# Patient Record
Sex: FEMALE | Race: WHITE | NOT HISPANIC OR LATINO | Employment: OTHER | URBAN - METROPOLITAN AREA
[De-identification: names, ages, dates, MRNs, and addresses within clinical notes are randomized per-mention and may not be internally consistent; named-entity substitution may affect disease eponyms.]

---

## 2017-02-07 DIAGNOSIS — I10 HTN (HYPERTENSION): Primary | ICD-10-CM

## 2017-02-07 RX ORDER — LISINOPRIL 5 MG/1
5 TABLET ORAL DAILY
Qty: 90 TABLET | Refills: 2 | Status: SHIPPED | OUTPATIENT
Start: 2017-02-07 | End: 2017-11-01

## 2017-03-21 ENCOUNTER — TELEPHONE (OUTPATIENT)
Dept: CARDIOLOGY | Facility: CLINIC | Age: 77
End: 2017-03-21

## 2017-04-12 ENCOUNTER — TRANSFERRED RECORDS (OUTPATIENT)
Dept: HEALTH INFORMATION MANAGEMENT | Facility: CLINIC | Age: 77
End: 2017-04-12

## 2017-04-12 LAB — PHQ9 SCORE: 1

## 2017-05-15 ENCOUNTER — OFFICE VISIT (OUTPATIENT)
Dept: FAMILY MEDICINE | Facility: CLINIC | Age: 77
End: 2017-05-15
Payer: MEDICARE

## 2017-05-15 VITALS
HEIGHT: 64 IN | WEIGHT: 198.3 LBS | TEMPERATURE: 97.6 F | HEART RATE: 62 BPM | DIASTOLIC BLOOD PRESSURE: 71 MMHG | BODY MASS INDEX: 33.86 KG/M2 | OXYGEN SATURATION: 98 % | SYSTOLIC BLOOD PRESSURE: 120 MMHG

## 2017-05-15 DIAGNOSIS — R68.84 JAW PAIN: ICD-10-CM

## 2017-05-15 DIAGNOSIS — R10.12 ABDOMINAL PAIN, LEFT UPPER QUADRANT: ICD-10-CM

## 2017-05-15 DIAGNOSIS — H93.8X3 PLUGGED FEELING IN EAR, BILATERAL: Primary | ICD-10-CM

## 2017-05-15 PROCEDURE — 99213 OFFICE O/P EST LOW 20 MIN: CPT | Performed by: INTERNAL MEDICINE

## 2017-05-15 NOTE — PATIENT INSTRUCTIONS
If the ears continue to bother you let me know    If the abdominal pain is not gone soon or worsens.    Shawn Méndez M.D.

## 2017-05-15 NOTE — MR AVS SNAPSHOT
"              After Visit Summary   5/15/2017    Tennille Lee    MRN: [id number]           Patient Information     Date Of Birth          1940        Visit Information        Provider Department      5/15/2017 11:00 AM Shawn Méndez MD [facility]        Care Instructions    If the ears continue to bother you let me know    If the abdominal pain is not gone soon or worsens.    Shawn Méndez M.D.          Follow-ups after your visit        Who to contact     If you have questions or need follow up information about today's clinic visit or your schedule please contact [facility] directly at [phone].  Normal or non-critical lab and imaging results will be communicated to you by MyChart, letter or phone within 4 business days after the clinic has received the results. If you do not hear from us within 7 days, please contact the clinic through [employer] or phone. If you have a critical or abnormal lab result, we will notify you by phone as soon as possible.  Submit refill requests through [employer] or call your pharmacy and they will forward the refill request to us. Please allow 3 business days for your refill to be completed.          Additional Information About Your Visit        MyChart Information     [employer] gives you secure access to your electronic health record. If you see a primary care provider, you can also send messages to your care team and make appointments. If you have questions, please call your primary care clinic.  If you do not have a primary care provider, please call [phone] and they will assist you.        Care EveryWhere ID     This is your Care EveryWhere ID. This could be used by other organizations to access your [place] medical records  [id number]        Your Vitals Were     Pulse Temperature Height Pulse Oximetry Breastfeeding? BMI (Body Mass Index)    62 97.6  F (36.4  C) (Oral) 5' 3.5\" (1.613 m) 98% No 34.58 kg/m2       Blood Pressure from Last 3 " Encounters:   05/15/17 120/71   10/06/16 104/62   09/02/16 122/70    Weight from Last 3 Encounters:   05/15/17 198 lb 4.8 oz (89.9 kg)   10/06/16 209 lb (94.8 kg)   09/02/16 209 lb 11.2 oz (95.1 kg)              Today, you had the following     No orders found for display       Primary Care Provider Office Phone # Fax #    Shawn Méndez [phone]       [facility] [address] [facility] [address]  [place] [address]        Thank you!     Thank you for choosing [facility]  for your care. Our goal is always to provide you with excellent care. Hearing back from our patients is one way we can continue to improve our services. Please take a few minutes to complete the written survey that you may receive in the mail after your visit with us. Thank you!             Your Updated Medication List - Protect others around you: Learn how to safely use, store and throw away your medicines at www.disposemymeds.org.          This list is accurate as of: 5/15/17 11:16 AM.  Always use your most recent med list.                   Brand Name Dispense Instructions for use    ALEVE -25 MG Tabs   Generic drug:  Naproxen Sod-Diphenhydramine      Take 2 tablets by mouth At Bedtime       atorvastatin 10 MG tablet    LIPITOR    90 tablet    10mg 5 days per week and 5mg two days per week, advance to daily       calcium + D 600-200 MG-UNIT Tabs   Generic drug:  calcium carbonate-vitamin D     60 tablet    Take by mouth daily       COQ-10 PO      Take 100 mg by mouth daily as needed.       cycloSPORINE 0.05 % ophthalmic emulsion    RESTASIS     Apply 1 drop to eye daily       ezetimibe 10 MG tablet    ZETIA    90 tablet    Take 1 tablet (10 mg) by mouth daily       FOLIC ACID PO      Take 400 mcg by mouth daily as needed. 2x/week       gabapentin 100 MG capsule    NEURONTIN     2 capsules at        ICA AREDS 2 Caps      Take 2 tablets by mouth daily       lisinopril 5 MG tablet     PRINIVIL/ZESTRIL    90 tablet    Take 1 tablet (5 mg) by mouth daily       LORazepam 1 MG tablet    ATIVAN    30 tablet    Take 1 tablet (1 mg) by mouth as needed       MAGNESIUM OXIDE PO      Take 250 mg by mouth. 3x/week       OMEGA-3 FISH OIL PO      Take 1 g by mouth daily.       omeprazole 20 MG CR capsule    priLOSEC    90 capsule    Take 1 capsule (20 mg) by mouth daily       VITAMIN D3 PO      Take 1,000 Units by mouth daily. 3x/week       vitamin E 100 UNIT capsule    TOCOPHEROL     Take 100 Units by mouth 3 x per week

## 2017-05-15 NOTE — NURSING NOTE
"Chief Complaint   Patient presents with     Ear Problem     hasn't seen you since 08/07/14       Initial /71 (BP Location: Right arm, Patient Position: Chair, Cuff Size: Adult Large)  Pulse 62  Temp 97.6  F (36.4  C) (Oral)  Ht 5' 3.5\" (1.613 m)  Wt 198 lb 4.8 oz (89.9 kg)  SpO2 98%  Breastfeeding? No  BMI 34.58 kg/m2 Estimated body mass index is 34.58 kg/(m^2) as calculated from the following:    Height as of this encounter: 5' 3.5\" (1.613 m).    Weight as of this encounter: 198 lb 4.8 oz (89.9 kg).  Medication Reconciliation: complete.  Neda Fabian CMA    "

## 2017-05-15 NOTE — PROGRESS NOTES
Tennille Lee is a 76 year old female who presents for 2 issues    1. Ears plugged, left feels funny at times, right plugged.  Occasionally has some left jaw pain but not having fevers or uri, not ill.  NO chest pain or shortness of breath.      2. Discomfort left upper abdomen, just today.  Had this 6/16 as noted and labs and ct done and fine.  Not since.  No n/v, no f,,cs.  Had been fine before today, bm normal today, no b/b stools, no urine c/o, no chest pain or shortness of breath.    Past Medical History:   Diagnosis Date     ASCVD (arteriosclerotic cardiovascular disease) 8/12    angio done and med mgmt, done after abnl ct angio     Aragon's cyst     left     Degenerative disk disease     bulge cervicle disk     DJD (degenerative joint disease)      GERD (gastroesophageal reflux disease) 2002    egd done     Gilbert's disease     borderline elevated BR     Hemangioma of liver 9/12    ct angio with ?liver lesion, then ct of liver showed it be hemangioma, also renal cysts     Hx of colonoscopy 2010    [facility] [place]     Hypercholesteremia      Hypertension      Obese     prior phen/fen   no valve dz     JOSE (obstructive sleep apnea) 2008    cpap     Other symptoms involving cardiovascular system     bruit     Rosacea      Spinal stenosis     L4/5     Thyroid nodules 2011    Dr. Guzmán--benign     Urine incontinence      Vertigo      Past Surgical History:   Procedure Laterality Date     carpel tunnel surgery  2011     CORONARY ANGIOGRAPHY ADULT ORDER  8/7/12    moderate CAD     lid lag surgery  2004     right knee arthroscopy  1999     TONSILLECTOMY  child     Social History     Social History     Marital status:      Spouse name: N/A     Number of children: 3     Years of education: N/A     Occupational History     homemaker      Social History Main Topics     Smoking status: Former Smoker     Quit date: 6/1/1960     Smokeless tobacco: Never Used     Alcohol use No     Drug use: No     Sexual  activity: Not Currently     Other Topics Concern     Parent/Sibling W/ Cabg, Mi Or Angioplasty Before 65f 55m? Yes     Caffeine Concern No     decaf:  0-2 cups a day     Sleep Concern Yes     soemtimes takes lorazepam     Stress Concern Yes     Weight Concern Yes     Special Diet No     Exercise Yes     water aerobics x4 days a week     Seat Belt Yes     Social History Narrative     Current Outpatient Prescriptions   Medication Sig Dispense Refill     Naproxen Sod-Diphenhydramine (ALEVE PM) 220-25 MG TABS Take 2 tablets by mouth At Bedtime       lisinopril (PRINIVIL/ZESTRIL) 5 MG tablet Take 1 tablet (5 mg) by mouth daily 90 tablet 2     omeprazole (PRILOSEC) 20 MG CR capsule Take 1 capsule (20 mg) by mouth daily 90 capsule 3     atorvastatin (LIPITOR) 10 MG tablet 10mg 5 days per week and 5mg two days per week, advance to daily 90 tablet 3     gabapentin (NEURONTIN) 100 MG capsule 2 capsules at hs       LORazepam (ATIVAN) 1 MG tablet Take 1 tablet (1 mg) by mouth as needed 30 tablet 1     Multiple Vitamins-Minerals (ICAPS AREDS 2) CAPS Take 2 tablets by mouth daily       ezetimibe (ZETIA) 10 MG tablet Take 1 tablet (10 mg) by mouth daily 90 tablet 3     vitamin E (TOCOPHEROL) 100 UNIT capsule Take 100 Units by mouth 3 x per week       calcium carbonate-vitamin D (CALCIUM + D) 600-200 MG-UNIT TABS Take by mouth daily 60 tablet      cycloSPORINE (RESTASIS) 0.05 % ophthalmic emulsion Apply 1 drop to eye daily       Coenzyme Q10 (COQ-10 PO) Take 100 mg by mouth daily as needed.         FOLIC ACID PO Take 400 mcg by mouth daily as needed. 2x/week        MAGNESIUM OXIDE PO Take 250 mg by mouth. 3x/week        Omega-3 Fatty Acids (OMEGA-3 FISH OIL PO) Take 1 g by mouth daily.         Cholecalciferol (VITAMIN D3 PO) Take 1,000 Units by mouth daily. 3x/week        Allergies   Allergen Reactions     Blue Dyes Other (See Comments)     Disperse Blue 106- Blue textile Dye     Amoxicillin      Balsam Peru-Macrina [Amberderm]       "Clindamycin Hcl      Eugenol      Flu Virus Vaccine      Neomycin Sulfate      FAMILY HISTORY NOTED AND REVIEWED    REVIEW OF SYSTEMS: above    PHYSICAL EXAM    /71 (BP Location: Right arm, Patient Position: Chair, Cuff Size: Adult Large)  Pulse 62  Temp 97.6  F (36.4  C) (Oral)  Ht 5' 3.5\" (1.613 m)  Wt 198 lb 4.8 oz (89.9 kg)  SpO2 98%  Breastfeeding? No  BMI 34.58 kg/m2    Patient appears non toxic  Right canal blocked by wax  Left canal min wax, tm fine  Mouth within normal limits  Face symmetric, not red, swollen, not tender over jaw, no lesions  Neck within normal limits  Abdomen normal active bowel sounds, soft, non-tender, no mgr, no hepatosplenomegaly    ASSESSMENT:  1. Ear plugged and discomfort with jaw pain, neg exam x for wax, no signs infectious, doubt vasculitis, tumor, etc.  Will flush it and if not gone soon to call  2. Abdomen pain with neg exam and ct last year neg, no signs acute abdomen.  For now observe but if worsens or not gone soon to call    Shawn Méndez M.D.        "

## 2017-06-17 ENCOUNTER — TRANSFERRED RECORDS (OUTPATIENT)
Dept: HEALTH INFORMATION MANAGEMENT | Facility: CLINIC | Age: 77
End: 2017-06-17

## 2017-07-10 ENCOUNTER — TRANSFERRED RECORDS (OUTPATIENT)
Dept: HEALTH INFORMATION MANAGEMENT | Facility: CLINIC | Age: 77
End: 2017-07-10

## 2017-08-14 ENCOUNTER — TRANSFERRED RECORDS (OUTPATIENT)
Dept: HEALTH INFORMATION MANAGEMENT | Facility: CLINIC | Age: 77
End: 2017-08-14

## 2017-09-20 ENCOUNTER — OFFICE VISIT (OUTPATIENT)
Dept: CARDIOLOGY | Facility: CLINIC | Age: 77
End: 2017-09-20
Attending: NURSE PRACTITIONER
Payer: MEDICARE

## 2017-09-20 VITALS
HEART RATE: 67 BPM | DIASTOLIC BLOOD PRESSURE: 76 MMHG | HEIGHT: 64 IN | WEIGHT: 205.9 LBS | BODY MASS INDEX: 35.15 KG/M2 | SYSTOLIC BLOOD PRESSURE: 111 MMHG

## 2017-09-20 DIAGNOSIS — I25.10 CORONARY ARTERY DISEASE INVOLVING NATIVE CORONARY ARTERY OF NATIVE HEART WITHOUT ANGINA PECTORIS: ICD-10-CM

## 2017-09-20 DIAGNOSIS — K27.9 PUD (PEPTIC ULCER DISEASE): ICD-10-CM

## 2017-09-20 DIAGNOSIS — E78.5 HYPERLIPIDEMIA LDL GOAL <130: Primary | ICD-10-CM

## 2017-09-20 DIAGNOSIS — I25.10 ASCVD (ARTERIOSCLEROTIC CARDIOVASCULAR DISEASE): ICD-10-CM

## 2017-09-20 LAB
ALT SERPL W P-5'-P-CCNC: <5 U/L (ref 5–30)
CHOLEST SERPL-MCNC: 198 MG/DL
HDLC SERPL-MCNC: 70 MG/DL
LDLC SERPL CALC-MCNC: 107 MG/DL
NONHDLC SERPL-MCNC: 128 MG/DL
TRIGL SERPL-MCNC: 103 MG/DL

## 2017-09-20 PROCEDURE — 36415 COLL VENOUS BLD VENIPUNCTURE: CPT | Performed by: NURSE PRACTITIONER

## 2017-09-20 PROCEDURE — 99214 OFFICE O/P EST MOD 30 MIN: CPT | Performed by: INTERNAL MEDICINE

## 2017-09-20 PROCEDURE — 84460 ALANINE AMINO (ALT) (SGPT): CPT | Performed by: NURSE PRACTITIONER

## 2017-09-20 PROCEDURE — 80061 LIPID PANEL: CPT | Performed by: NURSE PRACTITIONER

## 2017-09-20 RX ORDER — OMEPRAZOLE 10 MG/1
10 CAPSULE, DELAYED RELEASE ORAL DAILY
Qty: 30 CAPSULE | Refills: 11 | Status: SHIPPED | OUTPATIENT
Start: 2017-09-20 | End: 2018-09-14

## 2017-09-20 RX ORDER — EZETIMIBE 10 MG/1
10 TABLET ORAL DAILY
Qty: 90 TABLET | Refills: 3 | Status: SHIPPED | OUTPATIENT
Start: 2017-09-20 | End: 2018-06-26

## 2017-09-20 RX ORDER — LANOLIN ALCOHOL/MO/W.PET/CERES
1000 CREAM (GRAM) TOPICAL
COMMUNITY

## 2017-09-20 NOTE — LETTER
9/20/2017    Shawn Méndez MD  [address]  [place] [address]    RE: Tennille Lee       Dear Colleague,    I had the pleasure of seeing Tennille Lee in the [facility].    This is a brief note regarding our mutual patient, Tennille Lee who is my cousin.  She is a 76-year-old woman.  She had chest pain a number of years ago.  Coronary angiogram fortunately showed no more than 50% narrowing.  Clinically, she has no chest pain now.  Her risk factors do include hyperlipidemia and some family history.  Last year, she did not tolerate full-dose Lipitor, so she was using a low dose alternating 5 and 10 with Zetia 10.  Somewhere along the line though, the Zetia was dropped, not intentionally, and we reviewed her numbers today and the cholesterol numbers not surprisingly have jumped up a bit where last year the LDL was 56 with a goal of 70 or less, her LDL is now 107.  Her HDL is 70, triglycerides 103, total cholesterol 198 and ALT 5.  xxxxxxxxxxxxxxxxxxx shows good control and her blood pressure shows good control.  At this juncture, I am going to recommend that she try getting up to Lipitor 10 daily.  If she is able to maintain that dose without muscle aches for approximately 2 months, she will get a lipid profile done in [place].  If she cannot tolerate the higher dose, she can go back to the current dose of Lipitor and I have given her a prescription to start Zetia 10 mg back again, and again in approximately 2 months she will get a lipid profile in [place].  I have made no other changes.  Her exam is otherwise unremarkable.  I recommended afterward she will come back next year, we will do lipids, electrolytes and I am going to do a stress test next year since that will be 6 years from the last time we looked at her coronary disease.      Thank you for allowing me to see Tennille.  Today's visit was 25 minutes, greater than 50% counseling.     Sincerely,    Agus Gill MD      [facility]

## 2017-09-20 NOTE — PROGRESS NOTES
HISTORY OF PRESENT ILLNESS:  This is a brief note regarding our mutual patient, Tennille Lee who is my cousin.  She is a 76-year-old woman.  She had chest pain a number of years ago.  Coronary angiogram fortunately showed no more than 50% narrowing.  Clinically, she has no chest pain now.  Her risk factors do include hyperlipidemia and some family history.  Last year, she did not tolerate full-dose Lipitor, so she was using a low dose alternating 5 and 10 with Zetia 10.  Somewhere along the line though, the Zetia was dropped, not intentionally, and we reviewed her numbers today and the cholesterol numbers not surprisingly have jumped up a bit where last year the LDL was 56 with a goal of 70 or less, her LDL is now 107.  Her HDL is 70, triglycerides 103, total cholesterol 198 and ALT 5.  xxxxxxxxxxxxxxxxxxx shows good control and her blood pressure shows good control.  At this juncture, I am going to recommend that she try getting up to Lipitor 10 daily.  If she is able to maintain that dose without muscle aches for approximately 2 months, she will get a lipid profile done in [place].  If she cannot tolerate the higher dose, she can go back to the current dose of Lipitor and I have given her a prescription to start Zetia 10 mg back again, and again in approximately 2 months she will get a lipid profile in [place].  I have made no other changes.  Her exam is otherwise unremarkable.  I recommended afterward she will come back next year, we will do lipids, electrolytes and I am going to do a stress test next year since that will be 6 years from the last time we looked at her coronary disease.      Thank you for allowing me to see Tennille.  Today's visit was 25 minutes, greater than 50% counseling.      Agus Gaines MD       cc:   Shawn Méndez MD    [facility]    [address]   [place] [address]         AGUS GAINES MD             D: 09/20/2017 11:00   T: 09/20/2017 12:42   MT: DEMARIO      Name:      MCKOYKAT   MRN:      [id number]        Account:      [id number]   :      1940           Service Date: 2017      Document: [id number]

## 2017-09-20 NOTE — MR AVS SNAPSHOT
After Visit Summary   9/20/2017    Tennille Lee    MRN: [id number]           Patient Information     Date Of Birth          1940        Visit Information        Provider Department      9/20/2017 10:15 AM Agus Gill MD [facility]        Today's Diagnoses     Hyperlipidemia LDL goal <130    -  1    Coronary artery disease involving native coronary artery of native heart without angina pectoris        ASCVD (arteriosclerotic cardiovascular disease)           Follow-ups after your visit        Additional Services     Follow-Up with Cardiologist                 Future tests that were ordered for you today     Open Future Orders        Priority Expected Expires Ordered    Basic metabolic panel Routine 9/20/2018 9/21/2018 9/20/2017    Lipid Profile Routine 9/20/2018 9/20/2018 9/20/2017    ALT Routine 9/20/2018 9/20/2018 9/20/2017    NM Exercise stress test (nuc card) Routine 9/20/2018 9/21/2018 9/20/2017    Follow-Up with Cardiologist Routine 9/20/2018 9/21/2018 9/20/2017            Who to contact     If you have questions or need follow up information about today's clinic visit or your schedule please contact [facility] directly at [phone].  Normal or non-critical lab and imaging results will be communicated to you by MyChart, letter or phone within 4 business days after the clinic has received the results. If you do not hear from us within 7 days, please contact the clinic through [employer] or phone. If you have a critical or abnormal lab result, we will notify you by phone as soon as possible.  Submit refill requests through [employer] or call your pharmacy and they will forward the refill request to us. Please allow 3 business days for your refill to be completed.          Additional Information About Your Visit        MyChart Information     [employer] gives you secure access to your electronic health record.  "If you see a primary care provider, you can also send messages to your care team and make appointments. If you have questions, please call your primary care clinic.  If you do not have a primary care provider, please call [phone] and they will assist you.        Care EveryWhere ID     This is your Care EveryWhere ID. This could be used by other organizations to access your [facility] medical records  [id number]        Your Vitals Were     Pulse Height BMI (Body Mass Index)             67 1.613 m (5' 3.5\") 35.9 kg/m2          Blood Pressure from Last 3 Encounters:   09/20/17 111/76   05/15/17 120/71   10/06/16 104/62    Weight from Last 3 Encounters:   09/20/17 93.4 kg (205 lb 14.4 oz)   05/15/17 89.9 kg (198 lb 4.8 oz)   10/06/16 94.8 kg (209 lb)              We Performed the Following     Follow-Up with Cardiologist          Today's Medication Changes          These changes are accurate as of: 9/20/17 11:01 AM.  If you have any questions, ask your nurse or doctor.               Start taking these medicines.        Dose/Directions    ezetimibe 10 MG tablet   Commonly known as:  ZETIA   Used for:  Coronary artery disease involving native coronary artery of native heart without angina pectoris   Started by:  Agus Gill MD        Dose:  10 mg   Take 1 tablet (10 mg) by mouth daily   Quantity:  90 tablet   Refills:  3         These medicines have changed or have updated prescriptions.        Dose/Directions    atorvastatin 10 MG tablet   Commonly known as:  LIPITOR   This may have changed:  additional instructions   Used for:  Coronary artery disease involving native coronary artery of native heart without angina pectoris        10mg 5 days per week and 5mg two days per week, advance to daily   Quantity:  90 tablet   Refills:  3            Where to get your medicines      Some of these will need a paper prescription and others can be bought over the counter.  Ask your nurse if you have questions.     Bring " a paper prescription for each of these medications     ezetimibe 10 MG tablet                Primary Care Provider Office Phone # Fax #    Shawn Agus Méndez [phone] [address] [address]        Equal Access to Services     MOYGRACE ALICIA : Hadii aad ku hadasho Soomaali, waaxda luqadaha, qaybta kaalmada adeegyada, waxay quein hayangélican adeparmjit lambert la'angélicayassine . So [facility] [phone].    ATENCIÓN: Si habla español, tiene a duffy disposición servicios gratuitos de asistencia lingüística. Llame al [phone].    We comply with applicable federal civil rights laws and [place] laws. We do not discriminate on the basis of race, color, national origin, age, disability sex, sexual orientation or gender identity.            Thank you!     Thank you for choosing [facility]  for your care. Our goal is always to provide you with excellent care. Hearing back from our patients is one way we can continue to improve our services. Please take a few minutes to complete the written survey that you may receive in the mail after your visit with us. Thank you!             Your Updated Medication List - Protect others around you: Learn how to safely use, store and throw away your medicines at www.disposemymeds.org.          This list is accurate as of: 9/20/17 11:01 AM.  Always use your most recent med list.                   Brand Name Dispense Instructions for use Diagnosis    ALEVE -25 MG Tabs   Generic drug:  Naproxen Sod-Diphenhydramine      Take 2 tablets by mouth At Bedtime        atorvastatin 10 MG tablet    LIPITOR    90 tablet    10mg 5 days per week and 5mg two days per week, advance to daily    Coronary artery disease involving native coronary artery of native heart without angina pectoris       calcium + D 600-200 MG-UNIT Tabs   Generic drug:  calcium carbonate-vitamin D     60 tablet    Take by mouth daily        COQ-10 PO      Take 100  mg by mouth daily as needed.        cyanocobalamin 1000 MCG tablet    vitamin  B-12     Take 1,000 mcg by mouth daily        ezetimibe 10 MG tablet    ZETIA    90 tablet    Take 1 tablet (10 mg) by mouth daily    Coronary artery disease involving native coronary artery of native heart without angina pectoris       FOLIC ACID PO      Take 400 mcg by mouth daily as needed. 2x/week        gabapentin 100 MG capsule    NEURONTIN     Take 300 mg by mouth daily 2 capsules at hs        lisinopril 5 MG tablet    PRINIVIL/ZESTRIL    90 tablet    Take 1 tablet (5 mg) by mouth daily    HTN (hypertension)       MAGNESIUM OXIDE PO      Take 250 mg by mouth. 3x/week        OMEGA-3 FISH OIL PO      Take 1 g by mouth daily.        omeprazole 20 MG CR capsule    priLOSEC    90 capsule    Take 1 capsule (20 mg) by mouth daily    Hyperlipidemia LDL goal <130, CAD (coronary artery disease)       VITAMIN D3 PO      Take 1,000 Units by mouth daily. 3x/week        vitamin E 100 UNIT capsule    TOCOPHEROL     Take 100 Units by mouth 3 x per week

## 2017-09-20 NOTE — PROGRESS NOTES
HPI and Plan:   See dictation    Orders Placed This Encounter   Procedures     NM Exercise stress test (nuc card)     Basic metabolic panel     Lipid Profile     ALT     Follow-Up with Cardiologist     Orders Placed This Encounter   Medications     cyanocobalamin (VITAMIN  B-12) 1000 MCG tablet     Sig: Take 1,000 mcg by mouth daily     ezetimibe (ZETIA) 10 MG tablet     Sig: Take 1 tablet (10 mg) by mouth daily     Dispense:  90 tablet     Refill:  3     Medications Discontinued During This Encounter   Medication Reason     cycloSPORINE (RESTASIS) 0.05 % ophthalmic emulsion Discontinued by another Health Care Provider     Multiple Vitamins-Minerals (ICAPS AREDS 2) CAPS Stopped by Patient     LORazepam (ATIVAN) 1 MG tablet Therapy completed     ezetimibe (ZETIA) 10 MG tablet Discontinued by another Health Care Provider         Encounter Diagnoses   Name Primary?     Coronary artery disease involving native coronary artery of native heart without angina pectoris      Hyperlipidemia LDL goal <130 Yes     ASCVD (arteriosclerotic cardiovascular disease)        CURRENT MEDICATIONS:  Current Outpatient Prescriptions   Medication Sig Dispense Refill     cyanocobalamin (VITAMIN  B-12) 1000 MCG tablet Take 1,000 mcg by mouth daily       ezetimibe (ZETIA) 10 MG tablet Take 1 tablet (10 mg) by mouth daily 90 tablet 3     Naproxen Sod-Diphenhydramine (ALEVE PM) 220-25 MG TABS Take 2 tablets by mouth At Bedtime       lisinopril (PRINIVIL/ZESTRIL) 5 MG tablet Take 1 tablet (5 mg) by mouth daily 90 tablet 2     omeprazole (PRILOSEC) 20 MG CR capsule Take 1 capsule (20 mg) by mouth daily 90 capsule 3     atorvastatin (LIPITOR) 10 MG tablet 10mg 5 days per week and 5mg two days per week, advance to daily (Patient taking differently: 10mg two days per week and 5mg five days per week, advance to daily) 90 tablet 3     gabapentin (NEURONTIN) 100 MG capsule Take 300 mg by mouth daily 2 capsules at hs        vitamin E (TOCOPHEROL) 100  UNIT capsule Take 100 Units by mouth 3 x per week       calcium carbonate-vitamin D (CALCIUM + D) 600-200 MG-UNIT TABS Take by mouth daily 60 tablet      Coenzyme Q10 (COQ-10 PO) Take 100 mg by mouth daily as needed.         FOLIC ACID PO Take 400 mcg by mouth daily as needed. 2x/week        MAGNESIUM OXIDE PO Take 250 mg by mouth. 3x/week        Omega-3 Fatty Acids (OMEGA-3 FISH OIL PO) Take 1 g by mouth daily.         Cholecalciferol (VITAMIN D3 PO) Take 1,000 Units by mouth daily. 3x/week        [DISCONTINUED] ezetimibe (ZETIA) 10 MG tablet Take 1 tablet (10 mg) by mouth daily 90 tablet 3       ALLERGIES     Allergies   Allergen Reactions     Blue Dyes Other (See Comments)     Disperse Blue 106- Blue textile Dye     Amoxicillin      Balsam [place] [Amberderm]      Clindamycin Hcl      Eugenol      Flu Virus Vaccine      Neomycin Sulfate        PAST MEDICAL HISTORY:  Past Medical History:   Diagnosis Date     Abdominal pain 06/2016    ct abd and pelvis with liver hemangiomas and renal cysts     ASCVD (arteriosclerotic cardiovascular disease) 8/12    angio done and med mgmt, done after abnl ct angio     Aragon's cyst     left     Degenerative disk disease     bulge cervicle disk     DJD (degenerative joint disease)      GERD (gastroesophageal reflux disease) 2002    egd done     Gilbert's disease     borderline elevated BR     Hemangioma of liver 9/12    ct angio with ?liver lesion, then ct of liver showed it be hemangioma, also renal cysts     Hx of colonoscopy 2010    [facility] [place]     Hypercholesteremia      Hypertension      Obese     prior phen/fen   no valve dz     JOSE (obstructive sleep apnea) 2008    cpap     Other symptoms involving cardiovascular system     bruit     Rosacea      Spinal stenosis     L4/5     Thyroid nodules 2011    Dr. Guzmán--benign     Urine incontinence      Vertigo        PAST SURGICAL HISTORY:  Past Surgical History:   Procedure Laterality Date     carpel tunnel surgery   "2011     CORONARY ANGIOGRAPHY ADULT ORDER  8/7/12    moderate CAD     lid lag surgery  2004     right knee arthroscopy  1999     TONSILLECTOMY  child       FAMILY HISTORY:  Family History   Problem Relation Age of Onset     Cancer - colorectal Father      HEART DISEASE Father 40     MI     Myocardial Infarction Father      Heart Defect Mother      Heart Surgery Mother      CABG     Heart Surgery Brother      CABG     Heart Surgery Daughter 40     stent placed       SOCIAL HISTORY:  Social History     Social History     Marital status:      Spouse name: N/A     Number of children: 3     Years of education: N/A     Occupational History     homemaker      Social History Main Topics     Smoking status: Former Smoker     Quit date: 6/1/1960     Smokeless tobacco: Never Used     Alcohol use No     Drug use: No     Sexual activity: Not Currently     Other Topics Concern     Parent/Sibling W/ Cabg, Mi Or Angioplasty Before 65f 55m? Yes     Caffeine Concern No     decaf:  0-2 cups a day     Sleep Concern Yes     soemtimes takes lorazepam     Stress Concern Yes     Weight Concern Yes     Special Diet No     Exercise Yes     water aerobics x4 days a week     Seat Belt Yes     Social History Narrative       Review of Systems:  Skin:  Negative     Eyes:  Positive for glasses  ENT:  Negative    Respiratory:  Negative    Cardiovascular:  Negative    Gastroenterology: Positive for reflux  Genitourinary:  Positive for incontinence  Musculoskeletal:  Positive for back pain  Neurologic:  Positive for numbness or tingling of feet  Psychiatric:  Negative    Heme/Lymph/Imm:  Positive for allergies  Endocrine:  Negative      Physical Exam:  Vitals: /76  Pulse 67  Ht 1.613 m (5' 3.5\")  Wt 93.4 kg (205 lb 14.4 oz)  BMI 35.9 kg/m2    Constitutional:  cooperative, alert and oriented, well developed, well nourished, in no acute distress        Skin:  warm and dry to the touch, no apparent skin lesions or masses noted    "     Head:  normocephalic, no masses or lesions        Eyes:  pupils equal and round, conjunctivae and lids unremarkable, sclera white, no xanthalasma, EOMS intact, no nystagmus        ENT:  no pallor or cyanosis, dentition good        Neck:  carotid pulses are full and equal bilaterally, JVP normal, no carotid bruit, no thyromegaly        Chest:  normal breath sounds, clear to auscultation, normal A-P diameter, normal symmetry, normal respiratory excursion, no use of accessory muscles          Cardiac: regular rhythm, normal S1/S2, no S3 or S4, apical impulse not displaced, no murmurs, gallops or rubs                  Abdomen:  abdomen soft, non-tender, BS normoactive, no mass, no HSM, no bruits obese      Vascular: pulses full and equal, no bruits auscultated                                        Extremities and Back:  no deformities, clubbing, cyanosis, erythema observed              Neurological:  affect appropriate, oriented to time, person and place          Recent Lab Results:  LIPID RESULTS:  Lab Results   Component Value Date    CHOL 198 09/20/2017    HDL 70 09/20/2017     (H) 09/20/2017    TRIG 103 09/20/2017    CHOLHDLRATIO 2.7 08/25/2015       LIVER ENZYME RESULTS:  Lab Results   Component Value Date    AST 11 06/24/2016    ALT <5 (L) 09/20/2017       CBC RESULTS:  Lab Results   Component Value Date    WBC 10.2 06/24/2016    RBC 5.12 06/24/2016    HGB 14.3 06/24/2016    HCT 43.8 06/24/2016    MCV 86 06/24/2016    MCH 27.9 06/24/2016    MCHC 32.6 06/24/2016    RDW 13.7 06/24/2016     06/24/2016       BMP RESULTS:  Lab Results   Component Value Date     09/01/2016    POTASSIUM 4.1 09/01/2016    CHLORIDE 103 09/01/2016    CO2 32 (H) 09/01/2016    ANIONGAP 9.1 09/01/2016     09/01/2016    BUN 21 09/01/2016    CR 0.86 09/01/2016    GFRESTIMATED 68 09/01/2016    GFRESTBLACK 83 09/01/2016    YUNIOR 9.6 09/01/2016        A1C RESULTS:  No results found for: A1C    INR RESULTS:  Lab Results    Component Value Date    INR 0.96 08/07/2012           CC  Radha Vera, APRN CNP  [address]  [place] [address]

## 2017-09-29 ENCOUNTER — TRANSFERRED RECORDS (OUTPATIENT)
Dept: HEALTH INFORMATION MANAGEMENT | Facility: CLINIC | Age: 77
End: 2017-09-29

## 2017-10-31 DIAGNOSIS — I25.10 CORONARY ARTERY DISEASE INVOLVING NATIVE CORONARY ARTERY OF NATIVE HEART WITHOUT ANGINA PECTORIS: ICD-10-CM

## 2017-10-31 RX ORDER — ATORVASTATIN CALCIUM 10 MG/1
TABLET, FILM COATED ORAL
Qty: 85 TABLET | Refills: 3 | Status: SHIPPED | OUTPATIENT
Start: 2017-10-31 | End: 2018-10-10

## 2017-10-31 NOTE — TELEPHONE ENCOUNTER
Called Pt to clarify dosing of Lipitor. Pt says she is taking 10 mg 5 days a week and 5 mg 2 days a week. CECILIA Wilcox RN

## 2017-11-01 DIAGNOSIS — I10 HTN (HYPERTENSION): ICD-10-CM

## 2017-11-01 RX ORDER — LISINOPRIL 5 MG/1
5 TABLET ORAL DAILY
Qty: 90 TABLET | Refills: 3 | Status: SHIPPED | OUTPATIENT
Start: 2017-11-01 | End: 2018-10-10

## 2017-11-21 DIAGNOSIS — I25.10 CAD (CORONARY ARTERY DISEASE): ICD-10-CM

## 2017-11-21 DIAGNOSIS — E78.5 HYPERLIPIDEMIA LDL GOAL <130: ICD-10-CM

## 2018-02-27 ENCOUNTER — TRANSFERRED RECORDS (OUTPATIENT)
Dept: HEALTH INFORMATION MANAGEMENT | Facility: CLINIC | Age: 78
End: 2018-02-27

## 2018-06-06 ENCOUNTER — TRANSFERRED RECORDS (OUTPATIENT)
Dept: HEALTH INFORMATION MANAGEMENT | Facility: CLINIC | Age: 78
End: 2018-06-06

## 2018-06-13 ENCOUNTER — TRANSFERRED RECORDS (OUTPATIENT)
Dept: HEALTH INFORMATION MANAGEMENT | Facility: CLINIC | Age: 78
End: 2018-06-13

## 2018-06-26 DIAGNOSIS — I25.10 CORONARY ARTERY DISEASE INVOLVING NATIVE CORONARY ARTERY OF NATIVE HEART WITHOUT ANGINA PECTORIS: ICD-10-CM

## 2018-06-26 RX ORDER — ATORVASTATIN CALCIUM 10 MG/1
TABLET, FILM COATED ORAL
Qty: 90 TABLET | Refills: 3 | Status: SHIPPED | OUTPATIENT
Start: 2018-06-26 | End: 2018-09-14

## 2018-06-26 RX ORDER — EZETIMIBE 10 MG/1
10 TABLET ORAL DAILY
Qty: 90 TABLET | Refills: 3 | Status: SHIPPED | OUTPATIENT
Start: 2018-06-26 | End: 2018-12-26

## 2018-07-05 ENCOUNTER — TRANSFERRED RECORDS (OUTPATIENT)
Dept: HEALTH INFORMATION MANAGEMENT | Facility: CLINIC | Age: 78
End: 2018-07-05

## 2018-07-30 ENCOUNTER — TRANSFERRED RECORDS (OUTPATIENT)
Dept: HEALTH INFORMATION MANAGEMENT | Facility: CLINIC | Age: 78
End: 2018-07-30

## 2018-08-08 ENCOUNTER — TRANSFERRED RECORDS (OUTPATIENT)
Dept: HEALTH INFORMATION MANAGEMENT | Facility: CLINIC | Age: 78
End: 2018-08-08

## 2018-08-13 ENCOUNTER — TRANSFERRED RECORDS (OUTPATIENT)
Dept: HEALTH INFORMATION MANAGEMENT | Facility: CLINIC | Age: 78
End: 2018-08-13

## 2018-08-20 ENCOUNTER — TRANSFERRED RECORDS (OUTPATIENT)
Dept: HEALTH INFORMATION MANAGEMENT | Facility: CLINIC | Age: 78
End: 2018-08-20

## 2018-09-07 ENCOUNTER — TRANSFERRED RECORDS (OUTPATIENT)
Dept: HEALTH INFORMATION MANAGEMENT | Facility: CLINIC | Age: 78
End: 2018-09-07

## 2018-09-14 ENCOUNTER — OFFICE VISIT (OUTPATIENT)
Dept: FAMILY MEDICINE | Facility: CLINIC | Age: 78
End: 2018-09-14
Payer: MEDICARE

## 2018-09-14 VITALS
HEART RATE: 74 BPM | WEIGHT: 208.4 LBS | BODY MASS INDEX: 35.58 KG/M2 | OXYGEN SATURATION: 94 % | SYSTOLIC BLOOD PRESSURE: 116 MMHG | DIASTOLIC BLOOD PRESSURE: 75 MMHG | HEIGHT: 64 IN

## 2018-09-14 DIAGNOSIS — T78.40XA ALLERGIC REACTION TO DRUG, INITIAL ENCOUNTER: Primary | ICD-10-CM

## 2018-09-14 LAB
ALBUMIN UR-MCNC: NEGATIVE MG/DL
APPEARANCE UR: CLEAR
BACTERIA #/AREA URNS HPF: ABNORMAL /HPF
BASOPHILS # BLD AUTO: 0 10E9/L (ref 0–0.2)
BASOPHILS NFR BLD AUTO: 0.4 %
BILIRUB UR QL STRIP: NEGATIVE
COLOR UR AUTO: YELLOW
DIFFERENTIAL METHOD BLD: NORMAL
EOSINOPHIL # BLD AUTO: 0.2 10E9/L (ref 0–0.7)
EOSINOPHIL NFR BLD AUTO: 4 %
ERYTHROCYTE [DISTWIDTH] IN BLOOD BY AUTOMATED COUNT: 13 % (ref 10–15)
GLUCOSE UR STRIP-MCNC: NEGATIVE MG/DL
HCT VFR BLD AUTO: 40.2 % (ref 35–47)
HGB BLD-MCNC: 13.2 G/DL (ref 11.7–15.7)
HGB UR QL STRIP: ABNORMAL
KETONES UR STRIP-MCNC: NEGATIVE MG/DL
LEUKOCYTE ESTERASE UR QL STRIP: ABNORMAL
LYMPHOCYTES # BLD AUTO: 0.9 10E9/L (ref 0.8–5.3)
LYMPHOCYTES NFR BLD AUTO: 16.8 %
MCH RBC QN AUTO: 28.2 PG (ref 26.5–33)
MCHC RBC AUTO-ENTMCNC: 32.8 G/DL (ref 31.5–36.5)
MCV RBC AUTO: 86 FL (ref 78–100)
MONOCYTES # BLD AUTO: 0.7 10E9/L (ref 0–1.3)
MONOCYTES NFR BLD AUTO: 11.8 %
NEUTROPHILS # BLD AUTO: 3.7 10E9/L (ref 1.6–8.3)
NEUTROPHILS NFR BLD AUTO: 67 %
NITRATE UR QL: NEGATIVE
NON-SQ EPI CELLS #/AREA URNS LPF: ABNORMAL /LPF
PH UR STRIP: 6.5 PH (ref 5–7)
PLATELET # BLD AUTO: 167 10E9/L (ref 150–450)
RBC # BLD AUTO: 4.68 10E12/L (ref 3.8–5.2)
RBC #/AREA URNS AUTO: ABNORMAL /HPF
SOURCE: ABNORMAL
SP GR UR STRIP: 1.01 (ref 1–1.03)
UROBILINOGEN UR STRIP-ACNC: 0.2 EU/DL (ref 0.2–1)
WBC # BLD AUTO: 5.5 10E9/L (ref 4–11)
WBC #/AREA URNS AUTO: ABNORMAL /HPF

## 2018-09-14 PROCEDURE — 81001 URINALYSIS AUTO W/SCOPE: CPT | Performed by: INTERNAL MEDICINE

## 2018-09-14 PROCEDURE — 36415 COLL VENOUS BLD VENIPUNCTURE: CPT | Performed by: INTERNAL MEDICINE

## 2018-09-14 PROCEDURE — 85025 COMPLETE CBC W/AUTO DIFF WBC: CPT | Performed by: INTERNAL MEDICINE

## 2018-09-14 PROCEDURE — 99214 OFFICE O/P EST MOD 30 MIN: CPT | Performed by: INTERNAL MEDICINE

## 2018-09-14 RX ORDER — HYDROXYZINE HYDROCHLORIDE 25 MG/1
25 TABLET, FILM COATED ORAL
Qty: 10 TABLET | Refills: 0 | Status: CANCELLED | OUTPATIENT
Start: 2018-09-14

## 2018-09-14 RX ORDER — METHYLPREDNISOLONE 4 MG
TABLET, DOSE PACK ORAL
Qty: 21 TABLET | Refills: 0 | Status: SHIPPED | OUTPATIENT
Start: 2018-09-14 | End: 2018-10-10

## 2018-09-14 NOTE — MR AVS SNAPSHOT
After Visit Summary   9/14/2018    Tennille Lee    MRN: [id number]           Patient Information     Date Of Birth          1940        Visit Information        Provider Department      9/14/2018 10:00 AM Sergio Kimbrough MD [facility]        Today's Diagnoses     Allergic reaction to drug, initial encounter    -  1      Care Instructions    Take prescribed medications as directed.    See medical attention if the rashes do not resolve, or should he develop any new symptoms.    Proceed to emergency room if you develop difficulties with breathing or swallowing.          Follow-ups after your visit        Your next 10 appointments already scheduled     Oct 02, 2018  3:30 PM CDT   [facility]    [address] [facility] [id number] [phone]           Bring a current list of meds and any records pertaining to this visit. For Physicals, please bring immunization records and any forms needing to be filled out. Please arrive 10 minutes early to complete paperwork.            Oct 10, 2018  7:50 AM CDT   LAB with [facility]   [facility] [place] ([facility])    [address]  [facility] [id number] [phone]           Please do not eat 10-12 hours before your appointment if you are coming in fasting for labs on lipids, cholesterol, or glucose (sugar). This does not apply to pregnant women. Water, hot tea and black coffee (with nothing added) are okay. Do not drink other fluids, diet soda or chew gum.            Oct 10, 2018  8:45 AM CDT   Return Visit with Agus Gill MD   [facility]   [place] ([facility])    [address]  [facility] [id number] [phone] OPT 2              Who to contact     If you have questions or need follow up information about today's clinic  "visit or your schedule please contact [facility] directly at [phone].  Normal or non-critical lab and imaging results will be communicated to you by MyChart, letter or phone within 4 business days after the clinic has received the results. If you do not hear from us within 7 days, please contact the clinic through [employer] or phone. If you have a critical or abnormal lab result, we will notify you by phone as soon as possible.  Submit refill requests through [employer] or call your pharmacy and they will forward the refill request to us. Please allow 3 business days for your refill to be completed.          Additional Information About Your Visit        [employer] Information     [employer] gives you secure access to your electronic health record. If you see a primary care provider, you can also send messages to your care team and make appointments. If you have questions, please call your primary care clinic.  If you do not have a primary care provider, please call [phone] and they will assist you.        Care EveryWhere ID     This is your Care EveryWhere ID. This could be used by other organizations to access your [facility] medical records  [id number]        Your Vitals Were     Pulse Height Pulse Oximetry BMI (Body Mass Index)          74 5' 3.5\" (1.613 m) 94% 36.34 kg/m2         Blood Pressure from Last 3 Encounters:   09/14/18 116/75   09/20/17 111/76   05/15/17 120/71    Weight from Last 3 Encounters:   09/14/18 208 lb 6.4 oz (94.5 kg)   09/20/17 205 lb 14.4 oz (93.4 kg)   05/15/17 198 lb 4.8 oz (89.9 kg)              We Performed the Following     CBC with platelets differential     UA reflex to Microscopic and Culture          Today's Medication Changes          These changes are accurate as of 9/14/18 10:30 AM.  If you have any questions, ask your nurse or doctor.               Start taking these medicines.        Dose/Directions    methylPREDNISolone 4 MG tablet   Commonly known as:  MEDROL " DOSEPAK   Used for:  Allergic reaction to drug, initial encounter   Started by:  Sergio Kimbrough MD        Follow package instructions   Quantity:  21 tablet   Refills:  0            Where to get your medicines      These medications were sent to [id number] - [place], [place] - [address] [facility]  [address] [facility] [address]     Phone:  [phone]     methylPREDNISolone 4 MG tablet                Primary Care Provider Office Phone # Fax #    Shawn Agus Méndez [phone] [address] [facility] [address]  [place] [address]        Equal Access to Services     [facility] AH: Hadii aad ku hadasho Soomaali, waaxda luqadaha, qaybta kaalmada adeegyada, waxay idiin hayaan adeeg fausto hendricks . So [facility] [phone].    ATENCIÓN: Si habla español, tiene a duffy disposición servicios gratuitos de asistencia lingüística. [facility] [phone].    We comply with applicable federal civil rights laws and [place] laws. We do not discriminate on the basis of race, color, national origin, age, disability, sex, sexual orientation, or gender identity.            Thank you!     Thank you for choosing [facility]  for your care. Our goal is always to provide you with excellent care. Hearing back from our patients is one way we can continue to improve our services. Please take a few minutes to complete the written survey that you may receive in the mail after your visit with us. Thank you!             Your Updated Medication List - Protect others around you: Learn how to safely use, store and throw away your medicines at www.disposemymeds.org.          This list is accurate as of 9/14/18 10:30 AM.  Always use your most recent med list.                   Brand Name Dispense Instructions for use Diagnosis    ALEVE -25 MG Tabs   Generic drug:  Naproxen Sod-Diphenhydramine      Take 2 tablets by mouth At Bedtime        atorvastatin 10 MG tablet    LIPITOR    85  tablet    10 mg 5 days a week and 5 mg 2 days a week    Coronary artery disease involving native coronary artery of native heart without angina pectoris       calcium + D 600-200 MG-UNIT Tabs   Generic drug:  calcium carbonate-vitamin D     60 tablet    Take by mouth daily        COQ-10 PO      Take 100 mg by mouth daily as needed.        cyanocobalamin 1000 MCG tablet    vitamin  B-12     Take 1,000 mcg by mouth daily        ezetimibe 10 MG tablet    ZETIA    90 tablet    Take 1 tablet (10 mg) by mouth daily    Coronary artery disease involving native coronary artery of native heart without angina pectoris       gabapentin 100 MG capsule    NEURONTIN     Take 300 mg by mouth daily 2 capsules at hs        lisinopril 5 MG tablet    PRINIVIL/ZESTRIL    90 tablet    Take 1 tablet (5 mg) by mouth daily    HTN (hypertension)       MAGNESIUM OXIDE PO      Take 250 mg by mouth. 3x/week        methylPREDNISolone 4 MG tablet    MEDROL DOSEPAK    21 tablet    Follow package instructions    Allergic reaction to drug, initial encounter       OMEGA-3 FISH OIL PO      Take 1 g by mouth daily.        omeprazole 20 MG CR capsule    priLOSEC    90 capsule    Take 1 capsule (20 mg) by mouth daily    Hyperlipidemia LDL goal <130, CAD (coronary artery disease)       VITAMIN D3 PO      Take 1,000 Units by mouth daily. 3x/week

## 2018-09-14 NOTE — PROGRESS NOTES
"HPI      SUBJECTIVE:   Tennille Lee is a 77 year old female who presents to clinic today for the following health issues:    Patient states that she has an allergic reaction to a medication called Myrbetriq 50 mg. The allergic reaction started 4 days ago.  Rash -- red, round, location all over the body, mild itchy.  Denies pain, pus, drainage.      Past Medical History:   Diagnosis Date     Abdominal pain 06/2016    ct abd and pelvis with liver hemangiomas and renal cysts     ASCVD (arteriosclerotic cardiovascular disease) 8/12    angio done and med mgmt, done after abnl ct angio     Aragon's cyst     left     Degenerative disk disease     bulge cervicle disk     DJD (degenerative joint disease)      GERD (gastroesophageal reflux disease) 2002    egd done     Gilbert's disease     borderline elevated BR     Hemangioma of liver 9/12    ct angio with ?liver lesion, then ct of liver showed it be hemangioma, also renal cysts     Hx of colonoscopy 2010    [facility] [place]     Hypercholesteremia      Hypertension      Obese     prior phen/fen   no valve dz     JOSE (obstructive sleep apnea) 2008    cpap     Other symptoms involving cardiovascular system     bruit     Rosacea      Spinal stenosis     L4/5     Thyroid nodules 2011    Dr. Guzmán--benign     Urine incontinence      Vertigo        Review of Systems   Constitutional: Negative for chills, fever and malaise/fatigue.   HENT: Negative for sore throat.    Respiratory: Negative for cough and shortness of breath.    Cardiovascular: Negative for chest pain and palpitations.   Gastrointestinal: Negative for abdominal pain, nausea and vomiting.   Musculoskeletal: Negative for myalgias.   Skin: Positive for itching and rash.       /75 (BP Location: Right arm, Patient Position: Chair, Cuff Size: Adult Large)  Pulse 74  Ht 5' 3.5\" (1.613 m)  Wt 208 lb 6.4 oz (94.5 kg)  SpO2 94%  BMI 36.34 kg/m2      Physical Exam   Constitutional: She is oriented to person, " place, and time. No distress.   HENT:   Mouth/Throat: Oropharynx is clear and moist.   Eyes: Conjunctivae are normal.   Cardiovascular: Normal rate, regular rhythm and normal heart sounds.    Pulmonary/Chest: Effort normal and breath sounds normal. No stridor. No respiratory distress.   Musculoskeletal: She exhibits no edema.   Neurological: She is alert and oriented to person, place, and time. GCS score is 15.   Skin: Rash (Generalized nonblanching erythematous maculopapular rashes) noted.   Vitals reviewed.        ICD-10-CM    1. Allergic reaction to drug, initial encounter T78.40XA methylPREDNISolone (MEDROL DOSEPAK) 4 MG tablet     CBC with platelets differential     UA reflex to Microscopic and Culture     Urine Microscopic       Patient Instructions   Take prescribed medications as directed.    Seek medical attention if the rashes do not resolve, or if you develop any new symptoms.    Proceed to emergency room if you develop difficulties with breathing or swallowing.

## 2018-09-14 NOTE — LETTER
[facility]  [address] [facility]  [address]  [place]  [address]  Tel: [phone]    September 14, 2018    Tennille Lee  [address]  [facility] [id number]        Good day to you Mrs. Lee.    Your blood counts are normal.    Your urine test showed a trace of blood. I would recommend that you follow up at the clinic to repeat your urine test.    Please call if you have questions or concerns.    Best,  Dr. Kimbrough / gloria    Resulted Orders   CBC with platelets differential   Result Value Ref Range    WBC 5.5 4.0 - 11.0 10e9/L    RBC Count 4.68 3.8 - 5.2 10e12/L    Hemoglobin 13.2 11.7 - 15.7 g/dL    Hematocrit 40.2 35.0 - 47.0 %    MCV 86 78 - 100 fl    MCH 28.2 26.5 - 33.0 pg    MCHC 32.8 31.5 - 36.5 g/dL    RDW 13.0 10.0 - 15.0 %    Platelet Count 167 150 - 450 10e9/L    % Neutrophils 67.0 %    % Lymphocytes 16.8 %    % Monocytes 11.8 %    % Eosinophils 4.0 %    % Basophils 0.4 %    Absolute Neutrophil 3.7 1.6 - 8.3 10e9/L    Absolute Lymphocytes 0.9 0.8 - 5.3 10e9/L    Absolute Monocytes 0.7 0.0 - 1.3 10e9/L    Absolute Eosinophils 0.2 0.0 - 0.7 10e9/L    Absolute Basophils 0.0 0.0 - 0.2 10e9/L    Diff Method Automated Method    UA reflex to Microscopic and Culture   Result Value Ref Range    Color Urine Yellow     Appearance Urine Clear     Glucose Urine Negative NEG^Negative mg/dL    Bilirubin Urine Negative NEG^Negative    Ketones Urine Negative NEG^Negative mg/dL    Specific Gravity Urine 1.015 1.003 - 1.035    Blood Urine Trace (A) NEG^Negative    pH Urine 6.5 5.0 - 7.0 pH    Protein Albumin Urine Negative NEG^Negative mg/dL    Urobilinogen Urine 0.2 0.2 - 1.0 EU/dL    Nitrite Urine Negative NEG^Negative    Leukocyte Esterase Urine Small (A) NEG^Negative    Source Urine    Urine Microscopic   Result Value Ref Range    WBC Urine 0 - 5 OTO5^0 - 5 /HPF    RBC Urine 2-5 (A) OTO2^O - 2 /HPF    Squamous Epithelial /LPF Urine Few FEW^Few /LPF    Bacteria Urine Few (A) NEG^Negative /HPF

## 2018-09-14 NOTE — PATIENT INSTRUCTIONS
Take prescribed medications as directed.    Seek medical attention if the rashes do not resolve, or if you develop any new symptoms.    Proceed to emergency room if you develop difficulties with breathing or swallowing.

## 2018-09-17 ENCOUNTER — TRANSFERRED RECORDS (OUTPATIENT)
Dept: HEALTH INFORMATION MANAGEMENT | Facility: CLINIC | Age: 78
End: 2018-09-17

## 2018-09-17 ASSESSMENT — ENCOUNTER SYMPTOMS
COUGH: 0
PALPITATIONS: 0
ABDOMINAL PAIN: 0
SHORTNESS OF BREATH: 0
FEVER: 0
CHILLS: 0
MYALGIAS: 0
SORE THROAT: 0
NAUSEA: 0
VOMITING: 0

## 2018-09-20 ENCOUNTER — TRANSFERRED RECORDS (OUTPATIENT)
Dept: HEALTH INFORMATION MANAGEMENT | Facility: CLINIC | Age: 78
End: 2018-09-20

## 2018-10-05 DIAGNOSIS — E78.5 HYPERLIPIDEMIA LDL GOAL <100: ICD-10-CM

## 2018-10-05 DIAGNOSIS — I10 BENIGN ESSENTIAL HYPERTENSION: Primary | ICD-10-CM

## 2018-10-10 ENCOUNTER — OFFICE VISIT (OUTPATIENT)
Dept: CARDIOLOGY | Facility: CLINIC | Age: 78
End: 2018-10-10
Payer: MEDICARE

## 2018-10-10 VITALS
HEIGHT: 64 IN | DIASTOLIC BLOOD PRESSURE: 74 MMHG | SYSTOLIC BLOOD PRESSURE: 120 MMHG | HEART RATE: 58 BPM | WEIGHT: 207 LBS | BODY MASS INDEX: 35.34 KG/M2

## 2018-10-10 DIAGNOSIS — E78.5 HYPERLIPIDEMIA LDL GOAL <100: ICD-10-CM

## 2018-10-10 DIAGNOSIS — I25.10 CORONARY ARTERY DISEASE INVOLVING NATIVE CORONARY ARTERY OF NATIVE HEART WITHOUT ANGINA PECTORIS: ICD-10-CM

## 2018-10-10 DIAGNOSIS — I10 ESSENTIAL HYPERTENSION: ICD-10-CM

## 2018-10-10 DIAGNOSIS — F41.9 ANXIETY: ICD-10-CM

## 2018-10-10 DIAGNOSIS — I10 BENIGN ESSENTIAL HYPERTENSION: ICD-10-CM

## 2018-10-10 DIAGNOSIS — E78.5 HYPERLIPIDEMIA LDL GOAL <130: Primary | ICD-10-CM

## 2018-10-10 LAB
ALT SERPL W P-5'-P-CCNC: <5 U/L (ref 5–30)
ANION GAP SERPL CALCULATED.3IONS-SCNC: 9.2 MMOL/L (ref 6–17)
BUN SERPL-MCNC: 16 MG/DL (ref 7–30)
CALCIUM SERPL-MCNC: 9.5 MG/DL (ref 8.5–10.5)
CHLORIDE SERPL-SCNC: 106 MMOL/L (ref 98–107)
CHOLEST SERPL-MCNC: 208 MG/DL
CO2 SERPL-SCNC: 30 MMOL/L (ref 23–29)
CREAT SERPL-MCNC: 0.99 MG/DL (ref 0.7–1.3)
GFR SERPL CREATININE-BSD FRML MDRD: 54 ML/MIN/1.7M2
GLUCOSE SERPL-MCNC: 110 MG/DL (ref 70–105)
HDLC SERPL-MCNC: 75 MG/DL
LDLC SERPL CALC-MCNC: 118 MG/DL
NONHDLC SERPL-MCNC: 133 MG/DL
POTASSIUM SERPL-SCNC: 4.2 MMOL/L (ref 3.5–5.1)
SODIUM SERPL-SCNC: 141 MMOL/L (ref 136–145)
TRIGL SERPL-MCNC: 77 MG/DL

## 2018-10-10 PROCEDURE — 80061 LIPID PANEL: CPT | Performed by: INTERNAL MEDICINE

## 2018-10-10 PROCEDURE — 99213 OFFICE O/P EST LOW 20 MIN: CPT | Performed by: INTERNAL MEDICINE

## 2018-10-10 PROCEDURE — 84460 ALANINE AMINO (ALT) (SGPT): CPT | Performed by: INTERNAL MEDICINE

## 2018-10-10 PROCEDURE — 80048 BASIC METABOLIC PNL TOTAL CA: CPT | Performed by: INTERNAL MEDICINE

## 2018-10-10 PROCEDURE — 36415 COLL VENOUS BLD VENIPUNCTURE: CPT | Performed by: INTERNAL MEDICINE

## 2018-10-10 RX ORDER — LISINOPRIL 5 MG/1
5 TABLET ORAL DAILY
Qty: 90 TABLET | Refills: 3 | Status: SHIPPED | OUTPATIENT
Start: 2018-10-10 | End: 2019-10-07

## 2018-10-10 RX ORDER — ATORVASTATIN CALCIUM 10 MG/1
5 TABLET, FILM COATED ORAL DAILY
Qty: 50 TABLET | Refills: 3 | Status: SHIPPED | OUTPATIENT
Start: 2018-10-10 | End: 2019-10-07

## 2018-10-10 RX ORDER — LORAZEPAM 1 MG/1
1 TABLET ORAL EVERY 8 HOURS PRN
Qty: 30 TABLET | Refills: 0 | Status: SHIPPED | OUTPATIENT
Start: 2018-10-10 | End: 2019-09-11

## 2018-10-10 NOTE — MR AVS SNAPSHOT
After Visit Summary   10/10/2018    Tennille Lee    MRN: [id number]           Patient Information     Date Of Birth          1940        Visit Information        Provider Department      10/10/2018 8:45 AM Agus Gill MD [facility]        Today's Diagnoses     Hyperlipidemia LDL goal <130    -  1    Coronary artery disease involving native coronary artery of native heart without angina pectoris        Essential hypertension        Anxiety           Follow-ups after your visit        Additional Services     Follow-Up with Cardiologist                 Future tests that were ordered for you today     Open Future Orders        Priority Expected Expires Ordered    Basic metabolic panel Routine 10/10/2019 10/11/2019 10/10/2018    Lipid Profile Routine 10/10/2019 10/10/2019 10/10/2018    ALT Routine 10/10/2019 10/10/2019 10/10/2018    Follow-Up with Cardiologist Routine 10/10/2019 10/11/2019 10/10/2018            Who to contact     If you have questions or need follow up information about today's clinic visit or your schedule please contact [facility] directly at [phone].  Normal or non-critical lab and imaging results will be communicated to you by [employer], letter or phone within 4 business days after the clinic has received the results. If you do not hear from us within 7 days, please contact the clinic through ticketstreett or phone. If you have a critical or abnormal lab result, we will notify you by phone as soon as possible.  Submit refill requests through [employer] or call your pharmacy and they will forward the refill request to us. Please allow 3 business days for your refill to be completed.          Additional Information About Your Visit        [employer] Information     [employer] gives you secure access to your electronic health record. If you see a primary care provider, you can also send messages to your care team  "and make appointments. If you have questions, please call your primary care clinic.  If you do not have a primary care provider, please call [phone] and they will assist you.        Care EveryWhere ID     This is your Care EveryWhere ID. This could be used by other organizations to access your [facility] medical records  [id number]        Your Vitals Were     Pulse Height BMI (Body Mass Index)             58 1.613 m (5' 3.5\") 36.09 kg/m2          Blood Pressure from Last 3 Encounters:   10/10/18 120/74   09/14/18 116/75   09/20/17 111/76    Weight from Last 3 Encounters:   10/10/18 93.9 kg (207 lb)   09/14/18 94.5 kg (208 lb 6.4 oz)   09/20/17 93.4 kg (205 lb 14.4 oz)                 Today's Medication Changes          These changes are accurate as of 10/10/18  9:38 AM.  If you have any questions, ask your nurse or doctor.               Start taking these medicines.        Dose/Directions    LORazepam 1 MG tablet   Commonly known as:  ATIVAN   Used for:  Anxiety   Started by:  Agus Gill MD        Dose:  1 mg   Take 1 tablet (1 mg) by mouth every 8 hours as needed for anxiety   Quantity:  30 tablet   Refills:  0         These medicines have changed or have updated prescriptions.        Dose/Directions    atorvastatin 10 MG tablet   Commonly known as:  LIPITOR   This may have changed:    - how much to take  - how to take this  - when to take this  - additional instructions   Used for:  Coronary artery disease involving native coronary artery of native heart without angina pectoris   Changed by:  Agus Gill MD        Dose:  5 mg   Take 0.5 tablets (5 mg) by mouth daily   Quantity:  50 tablet   Refills:  3            Where to get your medicines      These medications were sent to [id number] - [facility] [address] [facility]  [address] [facility] [address]     Phone:  [phone]     atorvastatin 10 MG tablet    lisinopril 5 MG tablet         Some of these will need a " paper prescription and others can be bought over the counter.  Ask your nurse if you have questions.     Bring a paper prescription for each of these medications     LORazepam 1 MG tablet                Primary Care Provider Office Phone # Fax #    Shawn Méndez [phone] [address]  [facility] [address]        Equal Access to Services     [facility]: Hadii aad ku hadasho Soomaali, waaxda luqadaha, qaybta kaalmada adeegyada, waxay idiin hayaan adeeg kharash lasami . So [facility] [phone].    ATENCIÓN: Si habla español, tiene a duffy disposición servicios gratuitos de asistencia lingüística. Llame al [phone].    We comply with applicable federal civil rights laws and [place] laws. We do not discriminate on the basis of race, color, national origin, age, disability, sex, sexual orientation, or gender identity.            Thank you!     Thank you for choosing [facility]  for your care. Our goal is always to provide you with excellent care. Hearing back from our patients is one way we can continue to improve our services. Please take a few minutes to complete the written survey that you may receive in the mail after your visit with us. Thank you!             Your Updated Medication List - Protect others around you: Learn how to safely use, store and throw away your medicines at www.disposemymeds.org.          This list is accurate as of 10/10/18  9:38 AM.  Always use your most recent med list.                   Brand Name Dispense Instructions for use Diagnosis    atorvastatin 10 MG tablet    LIPITOR    50 tablet    Take 0.5 tablets (5 mg) by mouth daily    Coronary artery disease involving native coronary artery of native heart without angina pectoris       calcium + D 600-200 MG-UNIT Tabs   Generic drug:  calcium carbonate-vitamin D     60 tablet    Take by mouth daily        COQ-10 PO      Take 100 mg by mouth daily as needed.         cyanocobalamin 1000 MCG tablet    vitamin  B-12     Take 1,000 mcg by mouth daily        ezetimibe 10 MG tablet    ZETIA    90 tablet    Take 1 tablet (10 mg) by mouth daily    Coronary artery disease involving native coronary artery of native heart without angina pectoris       gabapentin 100 MG capsule    NEURONTIN     Take 300 mg by mouth daily        lisinopril 5 MG tablet    PRINIVIL/ZESTRIL    90 tablet    Take 1 tablet (5 mg) by mouth daily    Essential hypertension       LORazepam 1 MG tablet    ATIVAN    30 tablet    Take 1 tablet (1 mg) by mouth every 8 hours as needed for anxiety    Anxiety       MAGNESIUM OXIDE PO      Take 250 mg by mouth. 3x/week        OMEGA-3 FISH OIL PO      Take 1 g by mouth daily.        omeprazole 20 MG CR capsule    priLOSEC    90 capsule    Take 1 capsule (20 mg) by mouth daily    Hyperlipidemia LDL goal <130, CAD (coronary artery disease)       VITAMIN D3 PO      Take 1,000 Units by mouth daily. 3x/week

## 2018-10-10 NOTE — LETTER
10/10/2018      Shawn Méndez MD  [address]  [place] [address]      RE: Kat Lee       Dear Colleague,    I had the pleasure of seeing Kat Lee in the [facility].    Service Date: 10/10/2018      OFFICE NOTE      HISTORY OF PRESENT ILLNESS:  This is a brief note regarding Kat Lee.  As you know, she is my cousin.  She is a 77-year-old woman.  She has no more than modest coronary disease, not symptomatic.  Because of this, we are shooting for LDLs below 100 and closer to 70.  You can see in Epic the lab test.  It turns out she is off her Zetia and her Lipitor because of muscle aches, so the values are probably not accurate.  We do note very mild impaired fasting glucose.  We talked today about diet and exercise program.  We are pretty certain the muscle aches are from the statins but she also has a history of bulging disk and she recently had a steroid hip injection which apparently hit the sciatic nerve and she has difficulty bending that leg.  I am going to ask her to go down to Lipitor 5 mg per day and then restart the Zetia at 10 mg. We will do a lipid panel next year but she will probably get one when she goes to [place] in between that time.  Her blood pressure numbers are excellent.  Her electrolyte panel is normal.  We briefly talked about doing ORQUIDEA but we are pretty certain the leg problem is not due to a vascular problem.      Today's visit was 20 minutes, greater than 50% counseling.      Agus Gaines MD       cc:   Shawn Méndez MD    [facility]    [address]   [place] [address]         AGUS GAINES MD             D: 10/10/2018   T: 10/10/2018   MT: DEMARIO      Name:     KAT LEE   MRN:      [id number]        Account:      [id number]   :      1940           Service Date: 10/10/2018      Document: [id number]         Outpatient Encounter Prescriptions as of 10/10/2018   Medication Sig Dispense Refill      atorvastatin (LIPITOR) 10 MG tablet Take 0.5 tablets (5 mg) by mouth daily 50 tablet 3     calcium carbonate-vitamin D (CALCIUM + D) 600-200 MG-UNIT TABS Take by mouth daily 60 tablet      Cholecalciferol (VITAMIN D3 PO) Take 1,000 Units by mouth daily. 3x/week        Coenzyme Q10 (COQ-10 PO) Take 100 mg by mouth daily as needed.         cyanocobalamin (VITAMIN  B-12) 1000 MCG tablet Take 1,000 mcg by mouth daily       gabapentin (NEURONTIN) 100 MG capsule Take 300 mg by mouth daily        lisinopril (PRINIVIL/ZESTRIL) 5 MG tablet Take 1 tablet (5 mg) by mouth daily 90 tablet 3     LORazepam (ATIVAN) 1 MG tablet Take 1 tablet (1 mg) by mouth every 8 hours as needed for anxiety 30 tablet 0     MAGNESIUM OXIDE PO Take 250 mg by mouth. 3x/week        Omega-3 Fatty Acids (OMEGA-3 FISH OIL PO) Take 1 g by mouth daily.         omeprazole (PRILOSEC) 20 MG CR capsule Take 1 capsule (20 mg) by mouth daily 90 capsule 3     ezetimibe (ZETIA) 10 MG tablet Take 1 tablet (10 mg) by mouth daily (Patient not taking: Reported on 10/10/2018) 90 tablet 3     [DISCONTINUED] atorvastatin (LIPITOR) 10 MG tablet 10 mg 5 days a week and 5 mg 2 days a week 85 tablet 3     [DISCONTINUED] lisinopril (PRINIVIL/ZESTRIL) 5 MG tablet Take 1 tablet (5 mg) by mouth daily 90 tablet 3     [DISCONTINUED] methylPREDNISolone (MEDROL DOSEPAK) 4 MG tablet Follow package instructions 21 tablet 0     [DISCONTINUED] Naproxen Sod-Diphenhydramine (ALEVE PM) 220-25 MG TABS Take 2 tablets by mouth At Bedtime       No facility-administered encounter medications on file as of 10/10/2018.        Again, thank you for allowing me to participate in the care of your patient.      Sincerely,    Agus Gill MD     [facility]

## 2018-10-10 NOTE — PROGRESS NOTES
HPI and Plan:   See dictation    Orders Placed This Encounter   Procedures     Basic metabolic panel     Lipid Profile     ALT     Follow-Up with Cardiologist     Orders Placed This Encounter   Medications     atorvastatin (LIPITOR) 10 MG tablet     Sig: Take 0.5 tablets (5 mg) by mouth daily     Dispense:  50 tablet     Refill:  3     lisinopril (PRINIVIL/ZESTRIL) 5 MG tablet     Sig: Take 1 tablet (5 mg) by mouth daily     Dispense:  90 tablet     Refill:  3     LORazepam (ATIVAN) 1 MG tablet     Sig: Take 1 tablet (1 mg) by mouth every 8 hours as needed for anxiety     Dispense:  30 tablet     Refill:  0     Medications Discontinued During This Encounter   Medication Reason     methylPREDNISolone (MEDROL DOSEPAK) 4 MG tablet Therapy completed     Naproxen Sod-Diphenhydramine (ALEVE PM) 220-25 MG TABS Stopped by Patient     atorvastatin (LIPITOR) 10 MG tablet Reorder     lisinopril (PRINIVIL/ZESTRIL) 5 MG tablet Reorder         Encounter Diagnoses   Name Primary?     Hyperlipidemia LDL goal <130 Yes     Coronary artery disease involving native coronary artery of native heart without angina pectoris      Essential hypertension      Anxiety        CURRENT MEDICATIONS:  Current Outpatient Prescriptions   Medication Sig Dispense Refill     atorvastatin (LIPITOR) 10 MG tablet Take 0.5 tablets (5 mg) by mouth daily 50 tablet 3     calcium carbonate-vitamin D (CALCIUM + D) 600-200 MG-UNIT TABS Take by mouth daily 60 tablet      Cholecalciferol (VITAMIN D3 PO) Take 1,000 Units by mouth daily. 3x/week        Coenzyme Q10 (COQ-10 PO) Take 100 mg by mouth daily as needed.         cyanocobalamin (VITAMIN  B-12) 1000 MCG tablet Take 1,000 mcg by mouth daily       gabapentin (NEURONTIN) 100 MG capsule Take 300 mg by mouth daily        lisinopril (PRINIVIL/ZESTRIL) 5 MG tablet Take 1 tablet (5 mg) by mouth daily 90 tablet 3     LORazepam (ATIVAN) 1 MG tablet Take 1 tablet (1 mg) by mouth every 8 hours as needed for anxiety 30  tablet 0     MAGNESIUM OXIDE PO Take 250 mg by mouth. 3x/week        Omega-3 Fatty Acids (OMEGA-3 FISH OIL PO) Take 1 g by mouth daily.         omeprazole (PRILOSEC) 20 MG CR capsule Take 1 capsule (20 mg) by mouth daily 90 capsule 3     ezetimibe (ZETIA) 10 MG tablet Take 1 tablet (10 mg) by mouth daily (Patient not taking: Reported on 10/10/2018) 90 tablet 3     [DISCONTINUED] atorvastatin (LIPITOR) 10 MG tablet 10 mg 5 days a week and 5 mg 2 days a week 85 tablet 3     [DISCONTINUED] lisinopril (PRINIVIL/ZESTRIL) 5 MG tablet Take 1 tablet (5 mg) by mouth daily 90 tablet 3       ALLERGIES     Allergies   Allergen Reactions     Blue Dyes Other (See Comments)     Disperse Blue 106- Blue textile Dye     Myrbetriq [Mirabegron] Rash     Amoxicillin      Balsam [place] [Amberderm]      Clindamycin Hcl      Eugenol      Flu Virus Vaccine      Neomycin Sulfate        PAST MEDICAL HISTORY:  Past Medical History:   Diagnosis Date     Abdominal pain 06/2016    ct abd and pelvis with liver hemangiomas and renal cysts     ASCVD (arteriosclerotic cardiovascular disease) 8/12    angio done and med mgmt, done after abnl ct angio     Aragon's cyst     left     Degenerative disk disease     bulge cervicle disk     DJD (degenerative joint disease)      GERD (gastroesophageal reflux disease) 2002    egd done     Gilbert's disease     borderline elevated BR     Hemangioma of liver 9/12    ct angio with ?liver lesion, then ct of liver showed it be hemangioma, also renal cysts     Hx of colonoscopy 2010    [facility]     Hypercholesteremia      Hypertension      IFG (impaired fasting glucose)      Obese     prior phen/fen   no valve dz     JOSE (obstructive sleep apnea) 2008    cpap     Other symptoms involving cardiovascular system     bruit     Rosacea      Spinal stenosis     L4/5     Thyroid nodules 2011    Dr. Guzmán--benign     TMJ (dislocation of temporomandibular joint)     left     Urine incontinence      Vertigo         PAST SURGICAL HISTORY:  Past Surgical History:   Procedure Laterality Date     carpel tunnel surgery  2011     CORONARY ANGIOGRAPHY ADULT ORDER  8/7/12    moderate CAD     lid lag surgery  2004     right knee arthroscopy  1999     TONSILLECTOMY  child       FAMILY HISTORY:  Family History   Problem Relation Age of Onset     Cancer - colorectal Father      HEART DISEASE Father 40     MI     Myocardial Infarction Father      Heart Defect Mother      Heart Surgery Mother      CABG     Heart Surgery Brother      CABG     Heart Surgery Daughter 40     stent placed       SOCIAL HISTORY:  Social History     Social History     Marital status:      Spouse name: N/A     Number of children: 3     Years of education: N/A     Occupational History     homemaker      Social History Main Topics     Smoking status: Former Smoker     Packs/day: 1.00     Types: Cigarettes     Start date: 1955     Quit date: 6/1/1960     Smokeless tobacco: Never Used     Alcohol use No     Drug use: No     Sexual activity: Not Currently     Other Topics Concern     Parent/Sibling W/ Cabg, Mi Or Angioplasty Before 65f 55m? Yes     Caffeine Concern No     decaf:  0-2 cups a day     Sleep Concern Yes     soemtimes takes lorazepam     Stress Concern Yes     Weight Concern Yes     Special Diet No     Exercise Yes     water aerobics x4 days a week     Seat Belt Yes     Social History Narrative       Review of Systems:  Skin:  Negative     Eyes:  Positive for glasses  ENT:  Negative    Respiratory:  Negative    Cardiovascular:  Negative    Gastroenterology: Positive for reflux  Genitourinary:  Positive for incontinence  Musculoskeletal:  Positive for back pain  Neurologic:  Positive for numbness or tingling of feet  Psychiatric:  Positive for sleep disturbances  Heme/Lymph/Imm:  Positive for allergies  Endocrine:  Negative      Physical Exam:  Vitals: /74 (BP Location: Left arm, Patient Position: Sitting, Cuff Size: Adult Regular)  Pulse  "58  Ht 1.613 m (5' 3.5\")  Wt 93.9 kg (207 lb)  BMI 36.09 kg/m2    Constitutional:  cooperative, alert and oriented, well developed, well nourished, in no acute distress        Skin:  warm and dry to the touch, no apparent skin lesions or masses noted          Head:  normocephalic, no masses or lesions        Eyes:  pupils equal and round, conjunctivae and lids unremarkable, sclera white, no xanthalasma, EOMS intact, no nystagmus        Lymph:No Cervical lymphadenopathy present     ENT:  no pallor or cyanosis, dentition good        Neck:  carotid pulses are full and equal bilaterally, JVP normal, no carotid bruit        Respiratory:  normal breath sounds, clear to auscultation, normal A-P diameter, normal symmetry, normal respiratory excursion, no use of accessory muscles         Cardiac: regular rhythm, normal S1/S2, no S3 or S4, apical impulse not displaced, no murmurs, gallops or rubs                pulses full and equal, no bruits auscultated                                        GI:  abdomen soft, non-tender, BS normoactive, no mass, no HSM, no bruits obese      Extremities and Muscular Skeletal:  no deformities, clubbing, cyanosis, erythema observed         just had steroid hip injection and they hit the nerve, she can't lay flat, needs to bend R leg    Neurological:  no gross motor deficits        Psych:  Alert and Oriented x 3      Recent Lab Results:  LIPID RESULTS:  Lab Results   Component Value Date    CHOL 208 (H) 10/10/2018    HDL 75 10/10/2018     (H) 10/10/2018    TRIG 77 10/10/2018    CHOLHDLRATIO 2.7 08/25/2015       LIVER ENZYME RESULTS:  Lab Results   Component Value Date    AST 11 06/24/2016    ALT <5 (L) 10/10/2018       CBC RESULTS:  Lab Results   Component Value Date    WBC 5.5 09/14/2018    RBC 4.68 09/14/2018    HGB 13.2 09/14/2018    HCT 40.2 09/14/2018    MCV 86 09/14/2018    MCH 28.2 09/14/2018    MCHC 32.8 09/14/2018    RDW 13.0 09/14/2018     09/14/2018       BMP " RESULTS:  Lab Results   Component Value Date     10/10/2018    POTASSIUM 4.2 10/10/2018    CHLORIDE 106 10/10/2018    CO2 30 (H) 10/10/2018    ANIONGAP 9.2 10/10/2018     (H) 10/10/2018    BUN 16 10/10/2018    CR 0.99 10/10/2018    GFRESTIMATED 54 (L) 10/10/2018    GFRESTBLACK 66 10/10/2018    YUNIOR 9.5 10/10/2018        A1C RESULTS:  No results found for: A1C    INR RESULTS:  Lab Results   Component Value Date    INR 0.96 08/07/2012           CC  No referring provider defined for this encounter.

## 2018-10-10 NOTE — PROGRESS NOTES
Service Date: 10/10/2018      OFFICE NOTE      HISTORY OF PRESENT ILLNESS:  This is a brief note regarding Kat Lee.  As you know, she is my cousin.  She is a 77-year-old woman.  She has no more than modest coronary disease, not symptomatic.  Because of this, we are shooting for LDLs below 100 and closer to 70.  You can see in Epic the lab test.  It turns out she is off her Zetia and her Lipitor because of muscle aches, so the values are probably not accurate.  We do note very mild impaired fasting glucose.  We talked today about diet and exercise program.  We are pretty certain the muscle aches are from the statins but she also has a history of bulging disk and she recently had a steroid hip injection which apparently hit the sciatic nerve and she has difficulty bending that leg.  I am going to ask her to go down to Lipitor 5 mg per day and then restart the Zetia at 10 mg. We will do a lipid panel next year but she will probably get one when she goes to [place] in between that time.  Her blood pressure numbers are excellent.  Her electrolyte panel is normal.  We briefly talked about doing ORQUIDEA but we are pretty certain the leg problem is not due to a vascular problem.      Today's visit was 20 minutes, greater than 50% counseling.      Agus Gaines MD       cc:   Shawn Méndez MD    [facility]    [address]   [place] [address]         AGUS GAINES MD             D: 10/10/2018   T: 10/10/2018   MT: DEMARIO      Name:     KAT LEE   MRN:      [id number]        Account:      [id number]   :      1940           Service Date: 10/10/2018      Document: [id number]

## 2018-10-10 NOTE — LETTER
10/10/2018    Shawn Méndez MD  [address]  [place] [address]    RE: Tennille Lee       Dear Colleague,    I had the pleasure of seeing Tennille Lee in the [facility].    HPI and Plan:   See dictation    Orders Placed This Encounter   Procedures     Basic metabolic panel     Lipid Profile     ALT     Follow-Up with Cardiologist     Orders Placed This Encounter   Medications     atorvastatin (LIPITOR) 10 MG tablet     Sig: Take 0.5 tablets (5 mg) by mouth daily     Dispense:  50 tablet     Refill:  3     lisinopril (PRINIVIL/ZESTRIL) 5 MG tablet     Sig: Take 1 tablet (5 mg) by mouth daily     Dispense:  90 tablet     Refill:  3     LORazepam (ATIVAN) 1 MG tablet     Sig: Take 1 tablet (1 mg) by mouth every 8 hours as needed for anxiety     Dispense:  30 tablet     Refill:  0     Medications Discontinued During This Encounter   Medication Reason     methylPREDNISolone (MEDROL DOSEPAK) 4 MG tablet Therapy completed     Naproxen Sod-Diphenhydramine (ALEVE PM) 220-25 MG TABS Stopped by Patient     atorvastatin (LIPITOR) 10 MG tablet Reorder     lisinopril (PRINIVIL/ZESTRIL) 5 MG tablet Reorder         Encounter Diagnoses   Name Primary?     Hyperlipidemia LDL goal <130 Yes     Coronary artery disease involving native coronary artery of native heart without angina pectoris      Essential hypertension      Anxiety        CURRENT MEDICATIONS:  Current Outpatient Prescriptions   Medication Sig Dispense Refill     atorvastatin (LIPITOR) 10 MG tablet Take 0.5 tablets (5 mg) by mouth daily 50 tablet 3     calcium carbonate-vitamin D (CALCIUM + D) 600-200 MG-UNIT TABS Take by mouth daily 60 tablet      Cholecalciferol (VITAMIN D3 PO) Take 1,000 Units by mouth daily. 3x/week        Coenzyme Q10 (COQ-10 PO) Take 100 mg by mouth daily as needed.         cyanocobalamin (VITAMIN  B-12) 1000 MCG tablet Take 1,000 mcg by mouth daily       gabapentin (NEURONTIN) 100 MG capsule Take 300 mg by  mouth daily        lisinopril (PRINIVIL/ZESTRIL) 5 MG tablet Take 1 tablet (5 mg) by mouth daily 90 tablet 3     LORazepam (ATIVAN) 1 MG tablet Take 1 tablet (1 mg) by mouth every 8 hours as needed for anxiety 30 tablet 0     MAGNESIUM OXIDE PO Take 250 mg by mouth. 3x/week        Omega-3 Fatty Acids (OMEGA-3 FISH OIL PO) Take 1 g by mouth daily.         omeprazole (PRILOSEC) 20 MG CR capsule Take 1 capsule (20 mg) by mouth daily 90 capsule 3     ezetimibe (ZETIA) 10 MG tablet Take 1 tablet (10 mg) by mouth daily (Patient not taking: Reported on 10/10/2018) 90 tablet 3     [DISCONTINUED] atorvastatin (LIPITOR) 10 MG tablet 10 mg 5 days a week and 5 mg 2 days a week 85 tablet 3     [DISCONTINUED] lisinopril (PRINIVIL/ZESTRIL) 5 MG tablet Take 1 tablet (5 mg) by mouth daily 90 tablet 3       ALLERGIES     Allergies   Allergen Reactions     Blue Dyes Other (See Comments)     Disperse Blue 106- Blue textile Dye     Myrbetriq [Mirabegron] Rash     Amoxicillin      Balsam [place] [Amberderm]      Clindamycin Hcl      Eugenol      Flu Virus Vaccine      Neomycin Sulfate        PAST MEDICAL HISTORY:  Past Medical History:   Diagnosis Date     Abdominal pain 06/2016    ct abd and pelvis with liver hemangiomas and renal cysts     ASCVD (arteriosclerotic cardiovascular disease) 8/12    angio done and med mgmt, done after abnl ct angio     Aragon's cyst     left     Degenerative disk disease     bulge cervicle disk     DJD (degenerative joint disease)      GERD (gastroesophageal reflux disease) 2002    egd done     Gilbert's disease     borderline elevated BR     Hemangioma of liver 9/12    ct angio with ?liver lesion, then ct of liver showed it be hemangioma, also renal cysts     Hx of colonoscopy 2010    [facility] [place] [place]     Hypercholesteremia      Hypertension      IFG (impaired fasting glucose)      Obese     prior phen/fen   no valve dz     JOSE (obstructive sleep apnea) 2008    cpap     Other symptoms involving  cardiovascular system     bruit     Rosacea      Spinal stenosis     L4/5     Thyroid nodules 2011    Dr. Guzmán--benign     TMJ (dislocation of temporomandibular joint)     left     Urine incontinence      Vertigo        PAST SURGICAL HISTORY:  Past Surgical History:   Procedure Laterality Date     carpel tunnel surgery  2011     CORONARY ANGIOGRAPHY ADULT ORDER  8/7/12    moderate CAD     lid lag surgery  2004     right knee arthroscopy  1999     TONSILLECTOMY  child       FAMILY HISTORY:  Family History   Problem Relation Age of Onset     Cancer - colorectal Father      HEART DISEASE Father 40     MI     Myocardial Infarction Father      Heart Defect Mother      Heart Surgery Mother      CABG     Heart Surgery Brother      CABG     Heart Surgery Daughter 40     stent placed       SOCIAL HISTORY:  Social History     Social History     Marital status:      Spouse name: N/A     Number of children: 3     Years of education: N/A     Occupational History     homemaker      Social History Main Topics     Smoking status: Former Smoker     Packs/day: 1.00     Types: Cigarettes     Start date: 1955     Quit date: 6/1/1960     Smokeless tobacco: Never Used     Alcohol use No     Drug use: No     Sexual activity: Not Currently     Other Topics Concern     Parent/Sibling W/ Cabg, Mi Or Angioplasty Before 65f 55m? Yes     Caffeine Concern No     decaf:  0-2 cups a day     Sleep Concern Yes     soemtimes takes lorazepam     Stress Concern Yes     Weight Concern Yes     Special Diet No     Exercise Yes     water aerobics x4 days a week     Seat Belt Yes     Social History Narrative       Review of Systems:  Skin:  Negative     Eyes:  Positive for glasses  ENT:  Negative    Respiratory:  Negative    Cardiovascular:  Negative    Gastroenterology: Positive for reflux  Genitourinary:  Positive for incontinence  Musculoskeletal:  Positive for back pain  Neurologic:  Positive for numbness or tingling of feet  Psychiatric:   "Positive for sleep disturbances  Heme/Lymph/Imm:  Positive for allergies  Endocrine:  Negative      Physical Exam:  Vitals: /74 (BP Location: Left arm, Patient Position: Sitting, Cuff Size: Adult Regular)  Pulse 58  Ht 1.613 m (5' 3.5\")  Wt 93.9 kg (207 lb)  BMI 36.09 kg/m2    Constitutional:  cooperative, alert and oriented, well developed, well nourished, in no acute distress        Skin:  warm and dry to the touch, no apparent skin lesions or masses noted          Head:  normocephalic, no masses or lesions        Eyes:  pupils equal and round, conjunctivae and lids unremarkable, sclera white, no xanthalasma, EOMS intact, no nystagmus        Lymph:No Cervical lymphadenopathy present     ENT:  no pallor or cyanosis, dentition good        Neck:  carotid pulses are full and equal bilaterally, JVP normal, no carotid bruit        Respiratory:  normal breath sounds, clear to auscultation, normal A-P diameter, normal symmetry, normal respiratory excursion, no use of accessory muscles         Cardiac: regular rhythm, normal S1/S2, no S3 or S4, apical impulse not displaced, no murmurs, gallops or rubs                pulses full and equal, no bruits auscultated                                        GI:  abdomen soft, non-tender, BS normoactive, no mass, no HSM, no bruits obese      Extremities and Muscular Skeletal:  no deformities, clubbing, cyanosis, erythema observed         just had steroid hip injection and they hit the nerve, she can't lay flat, needs to bend R leg    Neurological:  no gross motor deficits        Psych:  Alert and Oriented x 3      Recent Lab Results:  LIPID RESULTS:  Lab Results   Component Value Date    CHOL 208 (H) 10/10/2018    HDL 75 10/10/2018     (H) 10/10/2018    TRIG 77 10/10/2018    CHOLHDLRATIO 2.7 08/25/2015       LIVER ENZYME RESULTS:  Lab Results   Component Value Date    AST 11 06/24/2016    ALT <5 (L) 10/10/2018       CBC RESULTS:  Lab Results   Component Value Date "    WBC 5.5 09/14/2018    RBC 4.68 09/14/2018    HGB 13.2 09/14/2018    HCT 40.2 09/14/2018    MCV 86 09/14/2018    MCH 28.2 09/14/2018    MCHC 32.8 09/14/2018    RDW 13.0 09/14/2018     09/14/2018       BMP RESULTS:  Lab Results   Component Value Date     10/10/2018    POTASSIUM 4.2 10/10/2018    CHLORIDE 106 10/10/2018    CO2 30 (H) 10/10/2018    ANIONGAP 9.2 10/10/2018     (H) 10/10/2018    BUN 16 10/10/2018    CR 0.99 10/10/2018    GFRESTIMATED 54 (L) 10/10/2018    GFRESTBLACK 66 10/10/2018    YUNIOR 9.5 10/10/2018        A1C RESULTS:  No results found for: A1C    INR RESULTS:  Lab Results   Component Value Date    INR 0.96 08/07/2012           CC  No referring provider defined for this encounter.    Thank you for allowing me to participate in the care of your patient.      Sincerely,     Agus Gill MD     [facility]    cc:   No referring provider defined for this encounter.

## 2018-11-04 ENCOUNTER — TRANSFERRED RECORDS (OUTPATIENT)
Dept: HEALTH INFORMATION MANAGEMENT | Facility: CLINIC | Age: 78
End: 2018-11-04

## 2018-11-05 ENCOUNTER — TRANSFERRED RECORDS (OUTPATIENT)
Dept: HEALTH INFORMATION MANAGEMENT | Facility: CLINIC | Age: 78
End: 2018-11-05

## 2018-11-27 ENCOUNTER — TRANSFERRED RECORDS (OUTPATIENT)
Dept: HEALTH INFORMATION MANAGEMENT | Facility: CLINIC | Age: 78
End: 2018-11-27

## 2018-12-26 DIAGNOSIS — I25.10 CORONARY ARTERY DISEASE INVOLVING NATIVE CORONARY ARTERY OF NATIVE HEART WITHOUT ANGINA PECTORIS: ICD-10-CM

## 2018-12-26 RX ORDER — EZETIMIBE 10 MG/1
10 TABLET ORAL DAILY
Qty: 90 TABLET | Refills: 0 | Status: SHIPPED | OUTPATIENT
Start: 2018-12-26 | End: 2019-06-03

## 2019-02-11 DIAGNOSIS — E78.5 HYPERLIPIDEMIA LDL GOAL <130: ICD-10-CM

## 2019-02-11 DIAGNOSIS — I25.10 CAD (CORONARY ARTERY DISEASE): ICD-10-CM

## 2019-06-03 DIAGNOSIS — I25.10 CORONARY ARTERY DISEASE INVOLVING NATIVE CORONARY ARTERY OF NATIVE HEART WITHOUT ANGINA PECTORIS: ICD-10-CM

## 2019-06-03 RX ORDER — EZETIMIBE 10 MG/1
10 TABLET ORAL DAILY
Qty: 90 TABLET | Refills: 1 | Status: SHIPPED | OUTPATIENT
Start: 2019-06-03 | End: 2019-10-07

## 2019-06-24 ENCOUNTER — TELEPHONE (OUTPATIENT)
Dept: FAMILY MEDICINE | Facility: CLINIC | Age: 79
End: 2019-06-24

## 2019-06-24 NOTE — TELEPHONE ENCOUNTER
Dr Méndez patient is calling to get an order to Neurology clinic for Steroid injection for her hip,low back  And buttocks. She was  seen at the [facility] for injection on 05/8/18.  When out in [place] she received an injection from the pain doctor. Now back in [place] she is getting ready to have another injection. Please advise. I did not pend order did not know how.

## 2019-06-25 ENCOUNTER — TRANSFERRED RECORDS (OUTPATIENT)
Dept: HEALTH INFORMATION MANAGEMENT | Facility: CLINIC | Age: 79
End: 2019-06-25

## 2019-06-25 NOTE — TELEPHONE ENCOUNTER
How about just going back to [facility], should not need order?    Shawn Méndez M.D.

## 2019-06-25 NOTE — TELEPHONE ENCOUNTER
I will call patient and let her know to schedule appt with [facility]. Thanks    Dorene Cevallos  Referral Coordinator

## 2019-06-25 NOTE — TELEPHONE ENCOUNTER
Dr Méndez just checking to see if you had seen this message below. Thanks    Dorene Cevallos  Referral Coordinator

## 2019-07-22 ENCOUNTER — OFFICE VISIT (OUTPATIENT)
Dept: FAMILY MEDICINE | Facility: CLINIC | Age: 79
End: 2019-07-22
Payer: MEDICARE

## 2019-07-22 VITALS
OXYGEN SATURATION: 98 % | BODY MASS INDEX: 35.34 KG/M2 | HEIGHT: 64 IN | TEMPERATURE: 98.7 F | DIASTOLIC BLOOD PRESSURE: 62 MMHG | HEART RATE: 66 BPM | WEIGHT: 207 LBS | SYSTOLIC BLOOD PRESSURE: 104 MMHG

## 2019-07-22 DIAGNOSIS — Z00.00 ROUTINE GENERAL MEDICAL EXAMINATION AT A HEALTH CARE FACILITY: Primary | ICD-10-CM

## 2019-07-22 DIAGNOSIS — Z23 NEED FOR VACCINATION WITH 13-POLYVALENT PNEUMOCOCCAL CONJUGATE VACCINE: ICD-10-CM

## 2019-07-22 DIAGNOSIS — E66.01 MORBID OBESITY (H): ICD-10-CM

## 2019-07-22 DIAGNOSIS — G47.33 OSA (OBSTRUCTIVE SLEEP APNEA): ICD-10-CM

## 2019-07-22 DIAGNOSIS — K21.9 GASTROESOPHAGEAL REFLUX DISEASE, ESOPHAGITIS PRESENCE NOT SPECIFIED: ICD-10-CM

## 2019-07-22 DIAGNOSIS — M54.10 RADICULAR LOW BACK PAIN: ICD-10-CM

## 2019-07-22 DIAGNOSIS — R32 URINARY INCONTINENCE, UNSPECIFIED TYPE: ICD-10-CM

## 2019-07-22 DIAGNOSIS — I10 ESSENTIAL HYPERTENSION: ICD-10-CM

## 2019-07-22 DIAGNOSIS — M48.061 SPINAL STENOSIS OF LUMBAR REGION, UNSPECIFIED WHETHER NEUROGENIC CLAUDICATION PRESENT: ICD-10-CM

## 2019-07-22 DIAGNOSIS — E78.5 HYPERLIPIDEMIA LDL GOAL <130: ICD-10-CM

## 2019-07-22 DIAGNOSIS — I25.10 ASCVD (ARTERIOSCLEROTIC CARDIOVASCULAR DISEASE): ICD-10-CM

## 2019-07-22 DIAGNOSIS — Z23 NEED FOR TDAP VACCINATION: ICD-10-CM

## 2019-07-22 PROCEDURE — 90670 PCV13 VACCINE IM: CPT | Performed by: INTERNAL MEDICINE

## 2019-07-22 PROCEDURE — G0439 PPPS, SUBSEQ VISIT: HCPCS | Performed by: INTERNAL MEDICINE

## 2019-07-22 PROCEDURE — 90715 TDAP VACCINE 7 YRS/> IM: CPT | Performed by: INTERNAL MEDICINE

## 2019-07-22 PROCEDURE — G0009 ADMIN PNEUMOCOCCAL VACCINE: HCPCS | Mod: 59 | Performed by: INTERNAL MEDICINE

## 2019-07-22 PROCEDURE — 90471 IMMUNIZATION ADMIN: CPT | Performed by: INTERNAL MEDICINE

## 2019-07-22 RX ORDER — TRAZODONE HYDROCHLORIDE 100 MG/1
100 TABLET ORAL AT BEDTIME
COMMUNITY
End: 2021-07-09

## 2019-07-22 ASSESSMENT — ACTIVITIES OF DAILY LIVING (ADL): CURRENT_FUNCTION: HOUSEWORK REQUIRES ASSISTANCE

## 2019-07-22 ASSESSMENT — MIFFLIN-ST. JEOR: SCORE: 1396.01

## 2019-07-22 NOTE — NURSING NOTE
Screening Questionnaire for Adult Immunization    Are you sick today?   No   Do you have allergies to medications, food, a vaccine component or latex?   No   Have you ever had a serious reaction after receiving a vaccination?   No   Do you have a long-term health problem with heart disease, lung disease, asthma, kidney disease, metabolic disease (e.g. diabetes), anemia, or other blood disorder?   No   Do you have cancer, leukemia, HIV/AIDS, or any other immune system problem?   No   In the past 3 months, have you taken medications that affect  your immune system, such as prednisone, other steroids, or anticancer drugs; drugs for the treatment of rheumatoid arthritis, Crohn s disease, or psoriasis; or have you had radiation treatments?   No   Have you had a seizure, or a brain or other nervous system problem?   No   During the past year, have you received a transfusion of blood or blood     products, or been given immune (gamma) globulin or antiviral drug?   No   For women: Are you pregnant or is there a chance you could become        pregnant during the next month?   No   Have you received any vaccinations in the past 4 weeks?   No     Immunization questionnaire answers were all negative.        Per orders of Dr. Méndez, injection of Tdap and PCV 13 given by Audrey Kemp. Patient instructed to remain in clinic for 15 minutes afterwards, and to report any adverse reaction to me immediately.  Audrey Kemp, CMA on 7/22/2019 at 2:34 PM       Screening performed by Audrey Kemp on 7/22/2019 at 2:33 PM.

## 2019-07-22 NOTE — PROGRESS NOTES
SUBJECTIVE:   Tennille Lee is a 78 year old female who presents for Preventive Visit.    The patient presents for a physical.  [place]  [place] who did an MRI of her low back as well as hip due to pain.  The patient has had a pain in her left buttock that goes down the left calf and into the foot for a long time now.  Nothing on the right side or in the mid low back.  Her foot on the left has felt swollen although it is better now.  There is no weakness.  She does have chronic loss of control of her bladder for years but this is not new.  Her bowel control is fine.  There is no leg tingling or numbness or weakness.  When she walks he gets a bit better.    As noted, she has presumed coronary artery disease and sees cardiology for that.  She has an upcoming colonoscopy.  She uses CPAP.  Her acid reflux is controlled.  She will be seeing her gynecologist in the near future.  She has a chronic incontinence.    She otherwise has felt fairly well.               Past Medical History:      Past Medical History:   Diagnosis Date     Abdominal pain 06/2016    ct abd and pelvis with liver hemangiomas and renal cysts     ASCVD (arteriosclerotic cardiovascular disease) 8/12    angio done and med mgmt, done after abnl ct angio     Aragon's cyst     left     Degenerative disk disease     bulge cervicle disk     DJD (degenerative joint disease)      GERD (gastroesophageal reflux disease) 2002    egd done     Gilbert's disease     borderline elevated BR     Hemangioma of liver 9/12    ct angio with ?liver lesion, then ct of liver showed it be hemangioma, also renal cysts     Hx of colonoscopy 2010    [facility]     Hypercholesteremia      Hypertension      IFG (impaired fasting glucose)      Lumbar spinal stenosis 2018    done in [place], had mri     Obese     prior phen/fen   no valve dz     JOSE (obstructive sleep apnea) 2008    cpap     Other symptoms involving  cardiovascular system     bruit     Rosacea      Thyroid nodules     Dr. Guzmán--benign     TMJ (dislocation of temporomandibular joint)     left     Urine incontinence      Vertigo              Past Surgical History:      Past Surgical History:   Procedure Laterality Date     carpel tunnel surgery  2011     CORONARY ANGIOGRAPHY ADULT ORDER  12    moderate CAD     lid lag surgery  2004     right knee arthroscopy  1999     TONSILLECTOMY  child             Social History:     Social History     Socioeconomic History     Marital status:      Spouse name: Not on file     Number of children: 3     Years of education: Not on file     Highest education level: Not on file   Occupational History     Occupation: homemaker   Social Needs     Financial resource strain: Not on file     Food insecurity:     Worry: Not on file     Inability: Not on file     Transportation needs:     Medical: Not on file     Non-medical: Not on file   Tobacco Use     Smoking status: Former Smoker     Packs/day: 1.00     Types: Cigarettes     Start date:      Last attempt to quit: 1960     Years since quittin.1     Smokeless tobacco: Never Used   Substance and Sexual Activity     Alcohol use: No     Alcohol/week: 0.0 oz     Drug use: No     Sexual activity: Not Currently   Lifestyle     Physical activity:     Days per week: Not on file     Minutes per session: Not on file     Stress: Not on file   Relationships     Social connections:     Talks on phone: Not on file     Gets together: Not on file     Attends Taoist service: Not on file     Active member of club or organization: Not on file     Attends meetings of clubs or organizations: Not on file     Relationship status: Not on file     Intimate partner violence:     Fear of current or ex partner: Not on file     Emotionally abused: Not on file     Physically abused: Not on file     Forced sexual activity: Not on file   Other Topics Concern     Parent/sibling w/  CABG, MI or angioplasty before 65F 55M? Yes      Service Not Asked     Blood Transfusions Not Asked     Caffeine Concern No     Comment: decaf:  0-2 cups a day     Occupational Exposure Not Asked     Hobby Hazards Not Asked     Sleep Concern Yes     Comment: soemtimes takes lorazepam     Stress Concern Yes     Weight Concern Yes     Special Diet No     Back Care Not Asked     Exercise Yes     Comment: water aerobics x4 days a week     Bike Helmet Not Asked     Seat Belt Yes     Self-Exams Not Asked   Social History Narrative     Not on file             Family History:   reviewed         Allergies:     Allergies   Allergen Reactions     Blue Dyes Other (See Comments)     Disperse Blue 106- Blue textile Dye     Myrbetriq [Mirabegron] Rash     Amoxicillin      Balsam [place] [Amberderm]      Clindamycin Hcl      Eugenol      Flu Virus Vaccine      Neomycin Sulfate              Medications:     Current Outpatient Medications   Medication Sig Dispense Refill     aspirin (ASA) 81 MG tablet Take 1 tablet (81 mg) by mouth daily       atorvastatin (LIPITOR) 10 MG tablet Take 0.5 tablets (5 mg) by mouth daily 50 tablet 3     calcium carbonate-vitamin D (CALCIUM + D) 600-200 MG-UNIT TABS Take by mouth daily 60 tablet      Cholecalciferol (VITAMIN D3 PO) Take 1,000 Units by mouth daily. 3x/week        Coenzyme Q10 (COQ-10 PO) Take 100 mg by mouth three times a week        cyanocobalamin (VITAMIN  B-12) 1000 MCG tablet Take 1,000 mcg by mouth daily       ezetimibe (ZETIA) 10 MG tablet Take 1 tablet (10 mg) by mouth daily 90 tablet 1     gabapentin (NEURONTIN) 100 MG capsule Take 300 mg by mouth daily        lisinopril (PRINIVIL/ZESTRIL) 5 MG tablet Take 1 tablet (5 mg) by mouth daily 90 tablet 3     LORazepam (ATIVAN) 1 MG tablet Take 1 tablet (1 mg) by mouth every 8 hours as needed for anxiety (Patient taking differently: Take 1 mg by mouth as needed for anxiety or sleep ) 30 tablet 0     MAGNESIUM OXIDE PO Take 250  "mg by mouth. 3x/week        melatonin 5 MG tablet Take 5 mg by mouth nightly as needed for sleep Taking 2 tablets PRN       Omega-3 Fatty Acids (OMEGA-3 FISH OIL PO) Take 1 g by mouth daily.         omeprazole (PRILOSEC) 20 MG DR capsule Take 1 capsule (20 mg) by mouth daily 90 capsule 2     traZODone (DESYREL) 100 MG tablet Take 100 mg by mouth At Bedtime                 Review of Systems:   The 10 point Review of Systems is negative other than noted in the HPI           Physical Exam:   Blood pressure 104/62, pulse 66, temperature 98.7  F (37.1  C), temperature source Oral, height 1.613 m (5' 3.5\"), weight 93.9 kg (207 lb), SpO2 98 %, not currently breastfeeding.    Exam:  Constitutional: healthy appearing, alert and in no distress  Heent: Normocephalic. Head without obvious masses or lesions. PERRLDC, EOMI. Mouth exam within normal limits: tongue, mucous membranes, posterior pharynx all normal, no lesions or abnormalities seen.  Tm's and canals within normal limits bilaterally. Neck supple, no nuchal rigidity or masses. No supraclavicular, or cervical adenopathy. Thyroid symmetric, no masses.  Cardiovascular: Regular rate and rhythm, no murmer, rub or gallops.  JVP not elevated, no edema.  Carotids within normal limits bilaterally, no bruits.  Respiratory: Normal respiratory effort.  Lungs clear, normal flow, no wheezing or crackles.  Gastrointestinal: Normal active bowel sounds.   Soft, not tender, no masses, guarding or rebound.  No hepatosplenomegaly.   Musculoskeletal: extremities normal, no gross deformities noted.  slr neg, normal rip range of motion, gait intact  Skin: no suspicious lesions or rashes   Neurologic: Mental status within normal limits.  Speech fluent.  No gross motor abnormalities and gait intact.  Psychiatric: mentation appears normal and affect normal.         Data:   Labs done via cards         Assessment:   1. Normal complete physical exam  2. Sciatica, to see pain clinic  3. Morbid " "obesity, weight loss  4. Spinal stenosis, to follow up pain mgmt  5. Gerd, controlled  6. Hypertension, controlled  8. Elevated cholesterol, follow up cards  9. Sid, cpap  10. Chronic urinary incont, doubt natty  11. hcm         Plan:   shingrix at pharm  tdap and prevnar  Colon soon  Follow up cards  See pain doctor  Call if worsens  Exercise, diet and weight loss      Shawn Méndez M.D.              Are you in the first 12 months of your Medicare coverage?  No    Healthy Habits:     In general, how would you rate your overall health?  Good    Frequency of exercise:  4-5 days/week    Duration of exercise:: 60mins walking in the pool.    Do you usually eat at least 4 servings of fruit and vegetables a day, include whole grains    & fiber and avoid regularly eating high fat or \"junk\" foods?  Yes    Taking medications regularly:  Yes    Barriers to taking medications:  None    Medication side effects:  None    Ability to successfully perform activities of daily living:  Housework requires assistance    Home Safety:  No safety concerns identified    Hearing Impairment:  Need to ask people to speak up or repeat themselves (wax build up)    In the past 6 months, have you been bothered by leaking of urine? Yes (5 pads every 24hours)    In general, how would you rate your overall mental or emotional health?  Excellent      PHQ-2 Total Score: 0    Additional concerns today:  No    Do you feel safe in your environment? YES    Do you have a Health Care Directive? No: Advance care planning reviewed with patient; information given to patient to review.      Fall risk  Fallen 2 or more times in the past year?: No  Any fall with injury in the past year?: No    Cognitive Screening   1) Repeat 3 items (Leader, Season, Table)    2) Clock draw: NORMAL  3) 3 item recall: Recalls 3 objects  Results: 0 items recalled: PROBABLE COGNITIVE IMPAIRMENT, **INFORM PROVIDER**    Mini-CogTM Copyright S Armando. Licensed by the author for use in " "[facility]; reprinted with permission ([email or web address]@[email or web address]). All rights reserved.      Do you have sleep apnea, excessive snoring or daytime drowsiness?: yes    Reviewed and updated as needed this visit by clinical staff  Tobacco  Allergies  Meds         Reviewed and updated as needed this visit by Provider        Social History     Tobacco Use     Smoking status: Former Smoker     Packs/day: 1.00     Types: Cigarettes     Start date:      Last attempt to quit: 1960     Years since quittin.1     Smokeless tobacco: Never Used   Substance Use Topics     Alcohol use: No     Alcohol/week: 0.0 oz     If you drink alcohol do you typically have >3 drinks per day or >7 drinks per week? No    Alcohol Use 2019   Prescreen: >3 drinks/day or >7 drinks/week? No             Current providers sharing in care for this patient include:   Patient Care Team:  Shawn Méndez MD as PCP - General (Internal Medicine)  Sergio Kimbrough MD as Assigned PCP    The following health maintenance items are reviewed in Epic and correct as of today:  Health Maintenance   Topic Date Due     DEXA  1940     ADVANCE CARE PLANNING  1940     DTAP/TDAP/TD IMMUNIZATION (1 - Tdap) 1965     MEDICARE ANNUAL WELLNESS VISIT  2005     ZOSTER IMMUNIZATION (2 of 3) 2007     PNEUMOCOCCAL IMMUNIZATION 65+ LOW/MEDIUM RISK (2 of 2 - PCV13) 2012     INFLUENZA VACCINE (1) 2019     FALL RISK ASSESSMENT  2020     PHQ-2  Completed     IPV IMMUNIZATION  Aged Out     MENINGITIS IMMUNIZATION  Aged Out           Review of Systems      OBJECTIVE:   /62 (BP Location: Left arm, Patient Position: Sitting, Cuff Size: Adult Large)   Pulse 66   Temp 98.7  F (37.1  C) (Oral)   Ht 1.613 m (5' 3.5\")   Wt 93.9 kg (207 lb)   SpO2 98%   Breastfeeding? No   BMI 36.09 kg/m   Estimated body mass index is 36.09 kg/m  as calculated from the following:    Height as of this encounter: " "1.613 m (5' 3.5\").    Weight as of this encounter: 93.9 kg (207 lb).  Physical Exam          ASSESSMENT / PLAN:       End of Life Planning:  Patient currently has an advanced directive: none, I rec it    COUNSELING:  Reviewed preventive health counseling, as reflected in patient instructions       Regular exercise       Healthy diet/nutrition    Estimated body mass index is 36.09 kg/m  as calculated from the following:    Height as of this encounter: 1.613 m (5' 3.5\").    Weight as of this encounter: 93.9 kg (207 lb).    Weight management plan: Discussed healthy diet and exercise guidelines     reports that she quit smoking about 59 years ago. Her smoking use included cigarettes. She started smoking about 64 years ago. She smoked 1.00 pack per day. She has never used smokeless tobacco.      Appropriate preventive services were discussed with this patient, including applicable screening as appropriate for cardiovascular disease, diabetes, osteopenia/osteoporosis, and glaucoma.  As appropriate for age/gender, discussed screening for colorectal cancer, prostate cancer, breast cancer, and cervical cancer. Checklist reviewing preventive services available has been given to the patient.    Reviewed patients plan of care and provided an AVS. The Basic Care Plan (routine screening as documented in Health Maintenance) for Tennille meets the Care Plan requirement. This Care Plan has been established and reviewed with the Patient.    Counseling Resources:  ATP IV Guidelines  Pooled Cohorts Equation Calculator  Breast Cancer Risk Calculator  FRAX Risk Assessment  ICSI Preventive Guidelines  Dietary Guidelines for Americans, 2010  USDA's MyPlate  ASA Prophylaxis  Lung CA Screening    Shawn Méndez MD  [facility]    Identified Health Risks:  "

## 2019-07-22 NOTE — PATIENT INSTRUCTIONS
I would recommend getting the new shingles shot called shingrix, but I would do it at your pharmacy as they can check with the insurance company to see if it is paid for.    I would see Dr. Young Cano for the back    Try to get your weight down    Shawn Méndez M.D.

## 2019-08-07 ENCOUNTER — HOSPITAL ENCOUNTER (OUTPATIENT)
Dept: GENERAL RADIOLOGY | Facility: CLINIC | Age: 79
Discharge: HOME | End: 2019-08-07
Attending: PAIN MEDICINE | Admitting: PAIN MEDICINE
Payer: MEDICARE

## 2019-08-07 VITALS — SYSTOLIC BLOOD PRESSURE: 139 MMHG | OXYGEN SATURATION: 96 % | HEART RATE: 72 BPM | DIASTOLIC BLOOD PRESSURE: 88 MMHG

## 2019-08-07 DIAGNOSIS — M54.16 LUMBAR RADICULOPATHY: ICD-10-CM

## 2019-08-07 PROCEDURE — 62323 NJX INTERLAMINAR LMBR/SAC: CPT

## 2019-08-07 PROCEDURE — 25000128 H RX IP 250 OP 636: Performed by: PAIN MEDICINE

## 2019-08-07 PROCEDURE — 25500064 ZZH RX 255 OP 636: Performed by: PAIN MEDICINE

## 2019-08-07 PROCEDURE — 25000125 ZZHC RX 250: Performed by: PAIN MEDICINE

## 2019-08-07 RX ORDER — IOPAMIDOL 408 MG/ML
10 INJECTION, SOLUTION INTRATHECAL ONCE
Status: COMPLETED | OUTPATIENT
Start: 2019-08-07 | End: 2019-08-07

## 2019-08-07 RX ORDER — BUPIVACAINE HYDROCHLORIDE 2.5 MG/ML
30 INJECTION, SOLUTION EPIDURAL; INFILTRATION; INTRACAUDAL ONCE
Status: COMPLETED | OUTPATIENT
Start: 2019-08-07 | End: 2019-08-07

## 2019-08-07 RX ORDER — TRIAMCINOLONE ACETONIDE 40 MG/ML
40 INJECTION, SUSPENSION INTRA-ARTICULAR; INTRAMUSCULAR ONCE
Status: COMPLETED | OUTPATIENT
Start: 2019-08-07 | End: 2019-08-07

## 2019-08-07 RX ADMIN — TRIAMCINOLONE ACETONIDE 40 MG: 40 INJECTION, SUSPENSION INTRA-ARTICULAR; INTRAMUSCULAR at 12:06

## 2019-08-07 RX ADMIN — LIDOCAINE HYDROCHLORIDE 6 ML: 10 INJECTION, SOLUTION EPIDURAL; INFILTRATION; INTRACAUDAL; PERINEURAL at 11:34

## 2019-08-07 RX ADMIN — IOPAMIDOL 1 ML: 408 INJECTION, SOLUTION INTRATHECAL at 11:36

## 2019-08-07 RX ADMIN — BUPIVACAINE HYDROCHLORIDE 2 ML: 2.5 INJECTION, SOLUTION EPIDURAL; INFILTRATION; INTRACAUDAL; PERINEURAL at 11:59

## 2019-08-29 ENCOUNTER — TRANSFERRED RECORDS (OUTPATIENT)
Dept: HEALTH INFORMATION MANAGEMENT | Facility: CLINIC | Age: 79
End: 2019-08-29

## 2019-09-09 ENCOUNTER — TRANSFERRED RECORDS (OUTPATIENT)
Dept: HEALTH INFORMATION MANAGEMENT | Facility: CLINIC | Age: 79
End: 2019-09-09

## 2019-09-11 DIAGNOSIS — F41.9 ANXIETY: ICD-10-CM

## 2019-09-11 RX ORDER — LORAZEPAM 1 MG/1
1 TABLET ORAL EVERY 8 HOURS PRN
Qty: 30 TABLET | Refills: 0 | Status: SHIPPED | OUTPATIENT
Start: 2019-09-11 | End: 2022-08-15

## 2019-09-11 NOTE — TELEPHONE ENCOUNTER
Needs  check    Controlled Substance Refill Request for Lorazepam  Problem List Complete:  No     PROVIDER TO CONSIDER COMPLETION OF PROBLEM LIST AND OVERVIEW/CONTROLLED SUBSTANCE AGREEMENT    Last Written Prescription Date:  10-  Last Fill Quantity: 30,   # refills: 0      Last Office Visit with NAS primary care provider: 7-22-19 Dev    Future Office visit:   Next 5 appointments (look out 90 days)    Oct 07, 2019  1:45 PM CDT  Return Visit with Agus Gill MD  [facility] [address] [place] [id number] [phone] OPT 2          Controlled substance agreement:   Encounter-Level CSA:    There are no encounter-level csa.     Patient-Level CSA:    There are no patient-level csa.         Last Urine Drug Screen: No results found for: CDAUT, No results found for: COMDAT, No results found for: THC13, PCP13, COC13, MAMP13, OPI13, AMP13, BZO13, TCA13, MTD13, BAR13, OXY13, PPX13, BUP13     Processing:  E-Rx     [email or web address]       checked in past 3 months?  No, route to RN      RT Buster (R)

## 2019-09-11 NOTE — TELEPHONE ENCOUNTER
Last filled #30 in October.  Confirmed by [place] ; no concerns.      Shelbie Oliveros RN on 9/11/2019 at 4:07 PM

## 2019-09-12 ENCOUNTER — TELEPHONE (OUTPATIENT)
Dept: CARDIOLOGY | Facility: CLINIC | Age: 79
End: 2019-09-12

## 2019-09-12 DIAGNOSIS — F41.9 ANXIETY: ICD-10-CM

## 2019-09-12 NOTE — TELEPHONE ENCOUNTER
Discussed with Pt that this is controlled substance and only one provider should be filling and preferred the PMD. Per chart looks like DR Méndez doing asked Pt to contact [facility] or PMD office to discuss. CECILIA Wilcox RN

## 2019-10-03 ENCOUNTER — HEALTH MAINTENANCE LETTER (OUTPATIENT)
Age: 79
End: 2019-10-03

## 2019-10-04 DIAGNOSIS — F41.9 ANXIETY: ICD-10-CM

## 2019-10-04 DIAGNOSIS — E78.5 HYPERLIPIDEMIA LDL GOAL <130: ICD-10-CM

## 2019-10-04 DIAGNOSIS — I25.10 CORONARY ARTERY DISEASE INVOLVING NATIVE CORONARY ARTERY OF NATIVE HEART WITHOUT ANGINA PECTORIS: ICD-10-CM

## 2019-10-04 DIAGNOSIS — I10 ESSENTIAL HYPERTENSION: ICD-10-CM

## 2019-10-04 LAB
ALT SERPL W P-5'-P-CCNC: 23 U/L (ref 0–50)
ANION GAP SERPL CALCULATED.3IONS-SCNC: 1 MMOL/L (ref 3–14)
BUN SERPL-MCNC: 19 MG/DL (ref 7–30)
CALCIUM SERPL-MCNC: 9.4 MG/DL (ref 8.5–10.1)
CHLORIDE SERPL-SCNC: 109 MMOL/L (ref 94–109)
CHOLEST SERPL-MCNC: 165 MG/DL
CO2 SERPL-SCNC: 32 MMOL/L (ref 20–32)
CREAT SERPL-MCNC: 0.9 MG/DL (ref 0.52–1.04)
GFR SERPL CREATININE-BSD FRML MDRD: 61 ML/MIN/{1.73_M2}
GLUCOSE SERPL-MCNC: 100 MG/DL (ref 70–99)
HDLC SERPL-MCNC: 71 MG/DL
LDLC SERPL CALC-MCNC: 69 MG/DL
NONHDLC SERPL-MCNC: 94 MG/DL
POTASSIUM SERPL-SCNC: 4 MMOL/L (ref 3.4–5.3)
SODIUM SERPL-SCNC: 142 MMOL/L (ref 133–144)
TRIGL SERPL-MCNC: 127 MG/DL

## 2019-10-04 PROCEDURE — 80061 LIPID PANEL: CPT | Performed by: INTERNAL MEDICINE

## 2019-10-04 PROCEDURE — 84460 ALANINE AMINO (ALT) (SGPT): CPT | Performed by: INTERNAL MEDICINE

## 2019-10-04 PROCEDURE — 36415 COLL VENOUS BLD VENIPUNCTURE: CPT | Performed by: INTERNAL MEDICINE

## 2019-10-04 PROCEDURE — 80048 BASIC METABOLIC PNL TOTAL CA: CPT | Performed by: INTERNAL MEDICINE

## 2019-10-07 ENCOUNTER — OFFICE VISIT (OUTPATIENT)
Dept: CARDIOLOGY | Facility: CLINIC | Age: 79
End: 2019-10-07
Payer: MEDICARE

## 2019-10-07 VITALS
HEIGHT: 64 IN | BODY MASS INDEX: 35.34 KG/M2 | WEIGHT: 207 LBS | HEART RATE: 67 BPM | SYSTOLIC BLOOD PRESSURE: 98 MMHG | DIASTOLIC BLOOD PRESSURE: 67 MMHG

## 2019-10-07 DIAGNOSIS — E78.5 HYPERLIPIDEMIA LDL GOAL <130: ICD-10-CM

## 2019-10-07 DIAGNOSIS — I25.10 CORONARY ARTERY DISEASE INVOLVING NATIVE CORONARY ARTERY OF NATIVE HEART WITHOUT ANGINA PECTORIS: ICD-10-CM

## 2019-10-07 DIAGNOSIS — F41.9 ANXIETY: ICD-10-CM

## 2019-10-07 DIAGNOSIS — I10 ESSENTIAL HYPERTENSION: ICD-10-CM

## 2019-10-07 PROCEDURE — 99213 OFFICE O/P EST LOW 20 MIN: CPT | Performed by: INTERNAL MEDICINE

## 2019-10-07 RX ORDER — EZETIMIBE 10 MG/1
10 TABLET ORAL DAILY
Qty: 90 TABLET | Refills: 3 | Status: SHIPPED | OUTPATIENT
Start: 2019-10-07

## 2019-10-07 RX ORDER — ATORVASTATIN CALCIUM 10 MG/1
TABLET, FILM COATED ORAL
Qty: 80 TABLET | Refills: 3 | Status: SHIPPED | OUTPATIENT
Start: 2019-10-07 | End: 2019-10-07

## 2019-10-07 RX ORDER — LISINOPRIL 5 MG/1
5 TABLET ORAL DAILY
Qty: 90 TABLET | Refills: 3 | Status: SHIPPED | OUTPATIENT
Start: 2019-10-07 | End: 2021-07-09

## 2019-10-07 RX ORDER — ATORVASTATIN CALCIUM 10 MG/1
TABLET, FILM COATED ORAL
Qty: 80 TABLET | Refills: 3 | Status: SHIPPED | OUTPATIENT
Start: 2019-10-07 | End: 2019-10-14

## 2019-10-07 RX ORDER — EZETIMIBE 10 MG/1
10 TABLET ORAL DAILY
Qty: 90 TABLET | Refills: 3 | Status: SHIPPED | OUTPATIENT
Start: 2019-10-07 | End: 2019-10-07

## 2019-10-07 RX ORDER — LISINOPRIL 5 MG/1
5 TABLET ORAL DAILY
Qty: 90 TABLET | Refills: 3 | Status: SHIPPED | OUTPATIENT
Start: 2019-10-07 | End: 2019-10-07

## 2019-10-07 ASSESSMENT — MIFFLIN-ST. JEOR: SCORE: 1396.08

## 2019-10-07 NOTE — PROGRESS NOTES
Service Date: 10/07/2019      CARDIOLOGY OFFICE NOTE      HISTORY OF PRESENT ILLNESS:  I had the pleasure of following up on my cousin, Tennille Lee.  The patient is a delightful 78-year-old woman.  She had some rather mild coronary disease.  You may remember, back in 2012, she had an angiogram that showed left main artery normal, LAD had mild calcification proximally with narrowing up to 20%.  The first diagonal vessel was a small vessel with 40% narrowing.  It was under 2 mm.  The left circumflex was large but nondominant with up to 40%-50% narrowing.  The right coronary artery was a dominant vessel with a 20% narrowing.  Her LV ejection fraction was normal at 55%.  There was no mitral insufficiency.  LV pressures including her LVEDP were normal.  We recommended ongoing medical therapy.  Her cholesterol numbers were checked.  The results are in Epic but she is completely at goal.  Her electrolyte panel is unremarkable.  Her blood pressure is actually rather low now at 98 systolic and we have checked that a couple times.  She has no dizziness, no syncope.  I am going to go ahead and ask her to hold her lisinopril but if her blood pressure creeps up into the 130s, she can restart the lisinopril at the current dose of 5 mg.      She does report rare episodes of shortness of breath with exertion, never at rest.  No PND.  No orthopnea.  She states it is not really a big issue.  If there is some concern, I would probably start with an echocardiogram just to make sure there is no change in her LV or RV function and then only secondarily consider a nuclear stress test to determine if the shortness of breath might be an anginal equivalent.  At this point, we do not think it is necessary.  She will be going to [place], so she has the option of doing that testing there.        Thank you for allowing me to see Tennille.  Today's visit was 20 minutes, greater than 50% counseling.      Agus Gill MD      cc:   Shawn Méndez MD     [facility]    [address]   [place] [address]         KIERA GAINES MD             D: 10/07/2019   T: 10/07/2019   MT: DEMARIO      Name:     KAT MCKOY   MRN:      [id number]        Account:      [id number]   :      1940           Service Date: 10/07/2019      Document: [id number]

## 2019-10-07 NOTE — LETTER
10/7/2019    Shawn Méndez MD  [address]  [place] [address]    RE: Tennille Lee       Dear Colleague,    I had the pleasure of seeing Tennille Lee in the [facility].    HPI and Plan:   See dictation    No orders of the defined types were placed in this encounter.    Orders Placed This Encounter   Medications     ezetimibe (ZETIA) 10 MG tablet     Sig: Take 1 tablet (10 mg) by mouth daily     Dispense:  90 tablet     Refill:  3     lisinopril (PRINIVIL/ZESTRIL) 5 MG tablet     Sig: Take 1 tablet (5 mg) by mouth daily     Dispense:  90 tablet     Refill:  3     DISCONTD: atorvastatin (LIPITOR) 10 MG tablet     Sig: Correct sig: take 1 tab (10mg) 5 days/week, take 1/2 tab (5mg) 2 days/week     Dispense:  80 tablet     Refill:  3     atorvastatin (LIPITOR) 10 MG tablet     Sig: Correct sig: take 1 tab (10mg) 5 days/week, take 1/2 tab (5mg) 2 days/week     Dispense:  80 tablet     Refill:  3     Medications Discontinued During This Encounter   Medication Reason     ezetimibe (ZETIA) 10 MG tablet Reorder     lisinopril (PRINIVIL/ZESTRIL) 5 MG tablet Reorder     atorvastatin (LIPITOR) 10 MG tablet Reorder     atorvastatin (LIPITOR) 10 MG tablet Reorder         Encounter Diagnoses   Name Primary?     Hyperlipidemia LDL goal <130      Coronary artery disease involving native coronary artery of native heart without angina pectoris      Essential hypertension      Anxiety        CURRENT MEDICATIONS:  Current Outpatient Medications   Medication Sig Dispense Refill     atorvastatin (LIPITOR) 10 MG tablet Correct sig: take 1 tab (10mg) 5 days/week, take 1/2 tab (5mg) 2 days/week 80 tablet 3     calcium carbonate-vitamin D (CALCIUM + D) 600-200 MG-UNIT TABS Take by mouth daily 60 tablet      Cholecalciferol (VITAMIN D3 PO) Take 1,000 Units by mouth daily. 3x/week        Coenzyme Q10 (COQ-10 PO) Take 100 mg by mouth three times a week        cyanocobalamin (VITAMIN  B-12) 1000 MCG tablet  Take 1,000 mcg by mouth daily       ezetimibe (ZETIA) 10 MG tablet Take 1 tablet (10 mg) by mouth daily 90 tablet 3     gabapentin (NEURONTIN) 100 MG capsule Take 300 mg by mouth daily        lisinopril (PRINIVIL/ZESTRIL) 5 MG tablet Take 1 tablet (5 mg) by mouth daily 90 tablet 3     LORazepam (ATIVAN) 1 MG tablet Take 1 tablet (1 mg) by mouth every 8 hours as needed for anxiety 30 tablet 0     MAGNESIUM OXIDE PO Take 250 mg by mouth. 3x/week        melatonin 5 MG tablet Take 5 mg by mouth nightly as needed for sleep Taking 2 tablets PRN       Omega-3 Fatty Acids (OMEGA-3 FISH OIL PO) Take 1 g by mouth daily.         omeprazole (PRILOSEC) 20 MG DR capsule Take 1 capsule (20 mg) by mouth daily 90 capsule 2     traZODone (DESYREL) 100 MG tablet Take 100 mg by mouth At Bedtime       aspirin (ASA) 81 MG tablet Take 1 tablet (81 mg) by mouth daily (Patient not taking: Reported on 10/7/2019)         ALLERGIES     Allergies   Allergen Reactions     Blue Dyes Other (See Comments)     Disperse Blue 106- Blue textile Dye     Myrbetriq [Mirabegron] Rash     Amoxicillin      Balsam Peru-Purmela [Amberderm]      Clindamycin Hcl      Eugenol      Flu Virus Vaccine      Neomycin Sulfate        PAST MEDICAL HISTORY:  Past Medical History:   Diagnosis Date     Abdominal pain 06/2016    ct abd and pelvis with liver hemangiomas and renal cysts     ASCVD (arteriosclerotic cardiovascular disease) 8/12    angio done and med mgmt, done after abnl ct angio     Aragon's cyst     left     Degenerative disk disease     bulge cervicle disk     DJD (degenerative joint disease)      GERD (gastroesophageal reflux disease) 2002    egd done     Gilbert's disease     borderline elevated BR     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 Guzmán--benign     TMJ (dislocation of temporomandibular joint)     left     Urine incontinence      Vertigo        PAST SURGICAL HISTORY:  Past Surgical History:   Procedure Laterality Date     carpel tunnel surgery       CORONARY ANGIOGRAPHY ADULT ORDER  12    moderate CAD     lid lag surgery  2004     right knee arthroscopy       TONSILLECTOMY  child       FAMILY HISTORY:  Family History   Problem Relation Age of Onset     Cancer - colorectal Father      Heart Disease Father 40        MI     Myocardial Infarction Father      Heart Defect Mother      Heart Surgery Mother         CABG     Heart Surgery Brother         CABG     Heart Surgery Daughter 40        stent placed       SOCIAL HISTORY:  Social History     Socioeconomic History     Marital status:      Spouse name: None     Number of children: 3     Years of education: None     Highest education level: None   Occupational History     Occupation: homemaker   Social Needs     Financial resource strain: None     Food insecurity:     Worry: None     Inability: None     Transportation needs:     Medical: None     Non-medical: None   Tobacco Use     Smoking status: Former Smoker     Packs/day: 1.00     Types: Cigarettes     Start date:      Last attempt to quit: 1960     Years since quittin.3     Smokeless tobacco: Never Used   Substance and Sexual Activity     Alcohol use: No     Alcohol/week: 0.0 standard drinks     Drug use: No     Sexual activity: Not Currently   Lifestyle     Physical activity:     Days per week: None     Minutes per session: None     Stress: None   Relationships     Social connections:     Talks on phone: None     Gets together: None     Attends Jainism service: None      "Active member of club or organization: None     Attends meetings of clubs or organizations: None     Relationship status: None     Intimate partner violence:     Fear of current or ex partner: None     Emotionally abused: None     Physically abused: None     Forced sexual activity: None   Other Topics Concern     Parent/sibling w/ CABG, MI or angioplasty before 65F 55M? Yes      Service Not Asked     Blood Transfusions Not Asked     Caffeine Concern No     Comment: decaf:  0-2 cups a day     Occupational Exposure Not Asked     Hobby Hazards Not Asked     Sleep Concern Yes     Comment: soemtimes takes lorazepam     Stress Concern Yes     Weight Concern Yes     Special Diet No     Back Care Not Asked     Exercise Yes     Comment: water aerobics x4 days a week     Bike Helmet Not Asked     Seat Belt Yes     Self-Exams Not Asked   Social History Narrative     None       Review of Systems:  Skin:  Negative     Eyes:  Positive for glasses  ENT:  Negative    Respiratory:  Positive for dyspnea on exertion  Cardiovascular:  Negative    Gastroenterology: Positive for reflux  Genitourinary:  Positive for incontinence  Musculoskeletal:  Positive for back pain  Neurologic:  Positive for numbness or tingling of feet  Psychiatric:  Positive for sleep disturbances  Heme/Lymph/Imm:  Positive for allergies  Endocrine:  Negative      Physical Exam:  Vitals: BP 98/67   Pulse 67   Ht 1.613 m (5' 3.5\")   Wt 93.9 kg (207 lb)   BMI 36.09 kg/m       Constitutional:  cooperative, alert and oriented, well developed, well nourished, in no acute distress        Skin:  warm and dry to the touch, no apparent skin lesions or masses noted          Head:  normocephalic, no masses or lesions        Eyes:  pupils equal and round, conjunctivae and lids unremarkable, sclera white, no xanthalasma, EOMS intact, no nystagmus        Lymph:No Cervical lymphadenopathy present;No thyromegaly     ENT:  no pallor or cyanosis, dentition good    "     Neck:  carotid pulses are full and equal bilaterally, JVP normal, no carotid bruit        Respiratory:  normal breath sounds, clear to auscultation, normal A-P diameter, normal symmetry, normal respiratory excursion, no use of accessory muscles         Cardiac: regular rhythm, normal S1/S2, no S3 or S4, apical impulse not displaced, no murmurs, gallops or rubs   distant heart sounds            pulses full and equal, no bruits auscultated                                        GI:  abdomen soft, non-tender, BS normoactive, no mass, no HSM, no bruits obese      Extremities and Muscular Skeletal:  no deformities, clubbing, cyanosis, erythema observed;no edema              Neurological:  no gross motor deficits        Psych:  Alert and Oriented x 3      Recent Lab Results:  LIPID RESULTS:  Lab Results   Component Value Date    CHOL 165 10/04/2019    HDL 71 10/04/2019    LDL 69 10/04/2019    TRIG 127 10/04/2019    CHOLHDLRATIO 2.7 08/25/2015       LIVER ENZYME RESULTS:  Lab Results   Component Value Date    AST 11 06/24/2016    ALT 23 10/04/2019       CBC RESULTS:  Lab Results   Component Value Date    WBC 5.5 09/14/2018    RBC 4.68 09/14/2018    HGB 13.2 09/14/2018    HCT 40.2 09/14/2018    MCV 86 09/14/2018    MCH 28.2 09/14/2018    MCHC 32.8 09/14/2018    RDW 13.0 09/14/2018     09/14/2018       BMP RESULTS:  Lab Results   Component Value Date     10/04/2019    POTASSIUM 4.0 10/04/2019    CHLORIDE 109 10/04/2019    CO2 32 10/04/2019    ANIONGAP 1 (L) 10/04/2019     (H) 10/04/2019    BUN 19 10/04/2019    CR 0.90 10/04/2019    GFRESTIMATED 61 10/04/2019    GFRESTBLACK 70 10/04/2019    YUNIOR 9.4 10/04/2019        A1C RESULTS:  No results found for: A1C    INR RESULTS:  Lab Results   Component Value Date    INR 0.96 08/07/2012           CC  Agus Gill MD  [address]  [place] [address]    HPI and Plan:   See dictation    No orders of the defined types were placed in this  encounter.    Orders Placed This Encounter   Medications     DISCONTD: ezetimibe (ZETIA) 10 MG tablet     Sig: Take 1 tablet (10 mg) by mouth daily     Dispense:  90 tablet     Refill:  3     DISCONTD: lisinopril (PRINIVIL/ZESTRIL) 5 MG tablet     Sig: Take 1 tablet (5 mg) by mouth daily     Dispense:  90 tablet     Refill:  3     DISCONTD: atorvastatin (LIPITOR) 10 MG tablet     Sig: Correct sig: take 1 tab (10mg) 5 days/week, take 1/2 tab (5mg) 2 days/week     Dispense:  80 tablet     Refill:  3     DISCONTD: atorvastatin (LIPITOR) 10 MG tablet     Sig: Correct sig: take 1 tab (10mg) 5 days/week, take 1/2 tab (5mg) 2 days/week     Dispense:  80 tablet     Refill:  3     atorvastatin (LIPITOR) 10 MG tablet     Sig: Correct sig: take 1 tab (10mg) 5 days/week, take 1/2 tab (5mg) 2 days/week     Dispense:  80 tablet     Refill:  3     ezetimibe (ZETIA) 10 MG tablet     Sig: Take 1 tablet (10 mg) by mouth daily     Dispense:  90 tablet     Refill:  3     lisinopril (PRINIVIL/ZESTRIL) 5 MG tablet     Sig: Take 1 tablet (5 mg) by mouth daily     Dispense:  90 tablet     Refill:  3     Medications Discontinued During This Encounter   Medication Reason     ezetimibe (ZETIA) 10 MG tablet Reorder     lisinopril (PRINIVIL/ZESTRIL) 5 MG tablet Reorder     atorvastatin (LIPITOR) 10 MG tablet Reorder     atorvastatin (LIPITOR) 10 MG tablet Reorder     atorvastatin (LIPITOR) 10 MG tablet Reorder     ezetimibe (ZETIA) 10 MG tablet Reorder     lisinopril (PRINIVIL/ZESTRIL) 5 MG tablet Reorder         Encounter Diagnoses   Name Primary?     Hyperlipidemia LDL goal <130      Coronary artery disease involving native coronary artery of native heart without angina pectoris      Essential hypertension      Anxiety        CURRENT MEDICATIONS:  Current Outpatient Medications   Medication Sig Dispense Refill     atorvastatin (LIPITOR) 10 MG tablet Correct sig: take 1 tab (10mg) 5 days/week, take 1/2 tab (5mg) 2 days/week 80 tablet 3      calcium carbonate-vitamin D (CALCIUM + D) 600-200 MG-UNIT TABS Take by mouth daily 60 tablet      Cholecalciferol (VITAMIN D3 PO) Take 1,000 Units by mouth daily. 3x/week        Coenzyme Q10 (COQ-10 PO) Take 100 mg by mouth three times a week        cyanocobalamin (VITAMIN  B-12) 1000 MCG tablet Take 1,000 mcg by mouth daily       ezetimibe (ZETIA) 10 MG tablet Take 1 tablet (10 mg) by mouth daily 90 tablet 3     gabapentin (NEURONTIN) 100 MG capsule Take 300 mg by mouth daily        lisinopril (PRINIVIL/ZESTRIL) 5 MG tablet Take 1 tablet (5 mg) by mouth daily 90 tablet 3     LORazepam (ATIVAN) 1 MG tablet Take 1 tablet (1 mg) by mouth every 8 hours as needed for anxiety 30 tablet 0     MAGNESIUM OXIDE PO Take 250 mg by mouth. 3x/week        melatonin 5 MG tablet Take 5 mg by mouth nightly as needed for sleep Taking 2 tablets PRN       Omega-3 Fatty Acids (OMEGA-3 FISH OIL PO) Take 1 g by mouth daily.         omeprazole (PRILOSEC) 20 MG DR capsule Take 1 capsule (20 mg) by mouth daily 90 capsule 2     traZODone (DESYREL) 100 MG tablet Take 100 mg by mouth At Bedtime       aspirin (ASA) 81 MG tablet Take 1 tablet (81 mg) by mouth daily (Patient not taking: Reported on 10/7/2019)         ALLERGIES     Allergies   Allergen Reactions     Blue Dyes Other (See Comments)     Disperse Blue 106- Blue textile Dye     Myrbetriq [Mirabegron] Rash     Amoxicillin      Balsam Peru-Pimento [Amberderm]      Clindamycin Hcl      Eugenol      Flu Virus Vaccine      Neomycin Sulfate        PAST MEDICAL HISTORY:  Past Medical History:   Diagnosis Date     Abdominal pain 06/2016    ct abd and pelvis with liver hemangiomas and renal cysts     ASCVD (arteriosclerotic cardiovascular disease) 8/12    angio done and med mgmt, done after abnl ct angio     Aragon's cyst     left     Degenerative disk disease     bulge cervicle disk     DJD (degenerative joint disease)      GERD (gastroesophageal reflux disease) 2002    egd done     Magdi  disease     borderline elevated BR     Hemangioma of liver     ct angio with ?liver lesion, then ct of liver showed it be hemangioma, also renal cysts     Hx of colonoscopy 2010    [facility] [place]     Hypercholesteremia      Hypertension      IFG (impaired fasting glucose)      Lumbar spinal stenosis 2018    done in [place], had mri     Obese     prior phen/fen   no valve dz     JOSE (obstructive sleep apnea)     cpap     Other symptoms involving cardiovascular system     bruit     Rosacea      Thyroid nodules     Dr. Guzmán--benign     TMJ (dislocation of temporomandibular joint)     left     Urine incontinence      Vertigo        PAST SURGICAL HISTORY:  Past Surgical History:   Procedure Laterality Date     carpel tunnel surgery       CORONARY ANGIOGRAPHY ADULT ORDER  12    moderate CAD     lid lag surgery       right knee arthroscopy       TONSILLECTOMY  child       FAMILY HISTORY:  Family History   Problem Relation Age of Onset     Cancer - colorectal Father      Heart Disease Father 40        MI     Myocardial Infarction Father      Heart Defect Mother      Heart Surgery Mother         CABG     Heart Surgery Brother         CABG     Heart Surgery Daughter 40        stent placed       SOCIAL HISTORY:  Social History     Socioeconomic History     Marital status:      Spouse name: None     Number of children: 3     Years of education: None     Highest education level: None   Occupational History     Occupation: homemaker   Social Needs     Financial resource strain: None     Food insecurity:     Worry: None     Inability: None     Transportation needs:     Medical: None     Non-medical: None   Tobacco Use     Smoking status: Former Smoker     Packs/day: 1.00     Types: Cigarettes     Start date:      Last attempt to quit: 1960     Years since quittin.3     Smokeless tobacco: Never Used   Substance and Sexual Activity     Alcohol use: No     Alcohol/week: 0.0  "standard drinks     Drug use: No     Sexual activity: Not Currently   Lifestyle     Physical activity:     Days per week: None     Minutes per session: None     Stress: None   Relationships     Social connections:     Talks on phone: None     Gets together: None     Attends Buddhism service: None     Active member of club or organization: None     Attends meetings of clubs or organizations: None     Relationship status: None     Intimate partner violence:     Fear of current or ex partner: None     Emotionally abused: None     Physically abused: None     Forced sexual activity: None   Other Topics Concern     Parent/sibling w/ CABG, MI or angioplasty before 65F 55M? Yes      Service Not Asked     Blood Transfusions Not Asked     Caffeine Concern No     Comment: decaf:  0-2 cups a day     Occupational Exposure Not Asked     Hobby Hazards Not Asked     Sleep Concern Yes     Comment: soemtimes takes lorazepam     Stress Concern Yes     Weight Concern Yes     Special Diet No     Back Care Not Asked     Exercise Yes     Comment: water aerobics x4 days a week     Bike Helmet Not Asked     Seat Belt Yes     Self-Exams Not Asked   Social History Narrative     None       Review of Systems:  Skin:  Negative     Eyes:  Positive for glasses  ENT:  Negative    Respiratory:  Positive for dyspnea on exertion  Cardiovascular:  Negative    Gastroenterology: Positive for reflux  Genitourinary:  Positive for incontinence  Musculoskeletal:  Positive for back pain  Neurologic:  Positive for numbness or tingling of feet  Psychiatric:  Positive for sleep disturbances  Heme/Lymph/Imm:  Positive for allergies  Endocrine:  Negative      Physical Exam:  Vitals: BP 98/67   Pulse 67   Ht 1.613 m (5' 3.5\")   Wt 93.9 kg (207 lb)   BMI 36.09 kg/m       Constitutional:  cooperative, alert and oriented, well developed, well nourished, in no acute distress        Skin:  warm and dry to the touch, no apparent skin lesions or masses noted  "         Head:  normocephalic, no masses or lesions        Eyes:  pupils equal and round, conjunctivae and lids unremarkable, sclera white, no xanthalasma, EOMS intact, no nystagmus        Lymph:No Cervical lymphadenopathy present;No thyromegaly     ENT:  no pallor or cyanosis, dentition good        Neck:  carotid pulses are full and equal bilaterally, JVP normal, no carotid bruit        Respiratory:  normal breath sounds, clear to auscultation, normal A-P diameter, normal symmetry, normal respiratory excursion, no use of accessory muscles         Cardiac: regular rhythm, normal S1/S2, no S3 or S4, apical impulse not displaced, no murmurs, gallops or rubs   distant heart sounds            pulses full and equal, no bruits auscultated                                        GI:  abdomen soft, non-tender, BS normoactive, no mass, no HSM, no bruits obese      Extremities and Muscular Skeletal:  no deformities, clubbing, cyanosis, erythema observed;no edema              Neurological:  no gross motor deficits        Psych:  Alert and Oriented x 3      Recent Lab Results:  LIPID RESULTS:  Lab Results   Component Value Date    CHOL 165 10/04/2019    HDL 71 10/04/2019    LDL 69 10/04/2019    TRIG 127 10/04/2019    CHOLHDLRATIO 2.7 08/25/2015       LIVER ENZYME RESULTS:  Lab Results   Component Value Date    AST 11 06/24/2016    ALT 23 10/04/2019       CBC RESULTS:  Lab Results   Component Value Date    WBC 5.5 09/14/2018    RBC 4.68 09/14/2018    HGB 13.2 09/14/2018    HCT 40.2 09/14/2018    MCV 86 09/14/2018    MCH 28.2 09/14/2018    MCHC 32.8 09/14/2018    RDW 13.0 09/14/2018     09/14/2018       BMP RESULTS:  Lab Results   Component Value Date     10/04/2019    POTASSIUM 4.0 10/04/2019    CHLORIDE 109 10/04/2019    CO2 32 10/04/2019    ANIONGAP 1 (L) 10/04/2019     (H) 10/04/2019    BUN 19 10/04/2019    CR 0.90 10/04/2019    GFRESTIMATED 61 10/04/2019    GFRESTBLACK 70 10/04/2019    YUNIOR 9.4 10/04/2019         A1C RESULTS:  No results found for: A1C    INR RESULTS:  Lab Results   Component Value Date    INR 0.96 08/07/2012           CC  Agus Gill MD  [address]  [place] [address]    Service Date: 10/07/2019      CARDIOLOGY OFFICE NOTE      HISTORY OF PRESENT ILLNESS:  I had the pleasure of following up on my cousin, Tennille Lee.  The patient is a delightful 78-year-old woman.  She had some rather mild coronary disease.  You may remember, back in 2012, she had an angiogram that showed left main artery normal, LAD had mild calcification proximally with narrowing up to 20%.  The first diagonal vessel was a small vessel with 40% narrowing.  It was under 2 mm.  The left circumflex was large but nondominant with up to 40%-50% narrowing.  The right coronary artery was a dominant vessel with a 20% narrowing.  Her LV ejection fraction was normal at 55%.  There was no mitral insufficiency.  LV pressures including her LVEDP were normal.  We recommended ongoing medical therapy.  Her cholesterol numbers were checked.  The results are in Epic but she is completely at goal.  Her electrolyte panel is unremarkable.  Her blood pressure is actually rather low now at 98 systolic and we have checked that a couple times.  She has no dizziness, no syncope.  I am going to go ahead and ask her to hold her lisinopril but if her blood pressure creeps up into the 130s, she can restart the lisinopril at the current dose of 5 mg.      She does report rare episodes of shortness of breath with exertion, never at rest.  No PND.  No orthopnea.  She states it is not really a big issue.  If there is some concern, I would probably start with an echocardiogram just to make sure there is no change in her LV or RV function and then only secondarily consider a nuclear stress test to determine if the shortness of breath might be an anginal equivalent.  At this point, we do not think it is necessary.  She will be going to [place], so she  has the option of doing that testing there.        Thank you for allowing me to see Kat.  Today's visit was 20 minutes, greater than 50% counseling.      Agus Gaines MD      cc:   Shawn Méndez MD    [facility]    [address]   [place] [address]         AGUS GAINES MD             D: 10/07/2019   T: 10/07/2019   MT: DW      Name:     KAT MCKOY   MRN:      [id number]        Account:      [id number]   :      1940           Service Date: 10/07/2019      Document: [id number]       Thank you for allowing me to participate in the care of your patient.      Sincerely,     Agus Gaines MD     [facility] [place]    cc:   Agus Gaines MD  [address]  [place] [id number]

## 2019-10-07 NOTE — LETTER
10/7/2019      Shawn Méndez MD  [address]  [place] [address]      RE: Tennille Lee       Dear Colleague,    I had the pleasure of seeing Tennille Lee in the [facility].    Service Date: 10/07/2019      CARDIOLOGY OFFICE NOTE      HISTORY OF PRESENT ILLNESS:  I had the pleasure of following up on my cousin, Tennille Lee.  The patient is a delightful 78-year-old woman.  She had some rather mild coronary disease.  You may remember, back in 2012, she had an angiogram that showed left main artery normal, LAD had mild calcification proximally with narrowing up to 20%.  The first diagonal vessel was a small vessel with 40% narrowing.  It was under 2 mm.  The left circumflex was large but nondominant with up to 40%-50% narrowing.  The right coronary artery was a dominant vessel with a 20% narrowing.  Her LV ejection fraction was normal at 55%.  There was no mitral insufficiency.  LV pressures including her LVEDP were normal.  We recommended ongoing medical therapy.  Her cholesterol numbers were checked.  The results are in Epic but she is completely at goal.  Her electrolyte panel is unremarkable.  Her blood pressure is actually rather low now at 98 systolic and we have checked that a couple times.  She has no dizziness, no syncope.  I am going to go ahead and ask her to hold her lisinopril but if her blood pressure creeps up into the 130s, she can restart the lisinopril at the current dose of 5 mg.      She does report rare episodes of shortness of breath with exertion, never at rest.  No PND.  No orthopnea.  She states it is not really a big issue.  If there is some concern, I would probably start with an echocardiogram just to make sure there is no change in her LV or RV function and then only secondarily consider a nuclear stress test to determine if the shortness of breath might be an anginal equivalent.  At this point, we do not think it is necessary.  She will be going to  [place], so she has the option of doing that testing there.        Thank you for allowing me to see Kat.  Today's visit was 20 minutes, greater than 50% counseling.      Agus Gaines MD      cc:   Shawn Méndez MD    [facility]    [address]   [place] [address]         AGUS GAINES MD             D: 10/07/2019   T: 10/07/2019   MT: DW      Name:     KAT MCKOY   MRN:      [id number]        Account:      [id number]   :      1940           Service Date: 10/07/2019      Document: [id number]         Outpatient Encounter Medications as of 10/7/2019   Medication Sig Dispense Refill     atorvastatin (LIPITOR) 10 MG tablet Correct sig: take 1 tab (10mg) 5 days/week, take 1/2 tab (5mg) 2 days/week 80 tablet 3     calcium carbonate-vitamin D (CALCIUM + D) 600-200 MG-UNIT TABS Take by mouth daily 60 tablet      Cholecalciferol (VITAMIN D3 PO) Take 1,000 Units by mouth daily. 3x/week        Coenzyme Q10 (COQ-10 PO) Take 100 mg by mouth three times a week        cyanocobalamin (VITAMIN  B-12) 1000 MCG tablet Take 1,000 mcg by mouth daily       ezetimibe (ZETIA) 10 MG tablet Take 1 tablet (10 mg) by mouth daily 90 tablet 3     gabapentin (NEURONTIN) 100 MG capsule Take 300 mg by mouth daily        lisinopril (PRINIVIL/ZESTRIL) 5 MG tablet Take 1 tablet (5 mg) by mouth daily 90 tablet 3     LORazepam (ATIVAN) 1 MG tablet Take 1 tablet (1 mg) by mouth every 8 hours as needed for anxiety 30 tablet 0     MAGNESIUM OXIDE PO Take 250 mg by mouth. 3x/week        melatonin 5 MG tablet Take 5 mg by mouth nightly as needed for sleep Taking 2 tablets PRN       Omega-3 Fatty Acids (OMEGA-3 FISH OIL PO) Take 1 g by mouth daily.         omeprazole (PRILOSEC) 20 MG DR capsule Take 1 capsule (20 mg) by mouth daily 90 capsule 2     traZODone (DESYREL) 100 MG tablet Take 100 mg by mouth At Bedtime       aspirin (ASA) 81 MG tablet Take 1 tablet (81 mg) by mouth daily (Patient not taking: Reported on  10/7/2019)       [DISCONTINUED] atorvastatin (LIPITOR) 10 MG tablet Correct sig: take 1 tab (10mg) 5 days/week, take 1/2 tab (5mg) 2 days/week 80 tablet 3     [DISCONTINUED] atorvastatin (LIPITOR) 10 MG tablet Correct sig: take 1 tab (10mg) 5 days/week, take 1/2 tab (5mg) 2 days/week 80 tablet 3     [DISCONTINUED] atorvastatin (LIPITOR) 10 MG tablet Take 0.5 tablets (5 mg) by mouth daily (Patient taking differently: Take 5 mg by mouth daily 1/2 tab (5mg) and 1 tab (10mg) five days per week) 50 tablet 3     [DISCONTINUED] ezetimibe (ZETIA) 10 MG tablet Take 1 tablet (10 mg) by mouth daily 90 tablet 3     [DISCONTINUED] ezetimibe (ZETIA) 10 MG tablet Take 1 tablet (10 mg) by mouth daily 90 tablet 1     [DISCONTINUED] lisinopril (PRINIVIL/ZESTRIL) 5 MG tablet Take 1 tablet (5 mg) by mouth daily 90 tablet 3     [DISCONTINUED] lisinopril (PRINIVIL/ZESTRIL) 5 MG tablet Take 1 tablet (5 mg) by mouth daily 90 tablet 3     No facility-administered encounter medications on file as of 10/7/2019.        Again, thank you for allowing me to participate in the care of your patient.      Sincerely,    Agus Gill MD     [facility]

## 2019-10-07 NOTE — PROGRESS NOTES
HPI and Plan:   See dictation    No orders of the defined types were placed in this encounter.    Orders Placed This Encounter   Medications     DISCONTD: ezetimibe (ZETIA) 10 MG tablet     Sig: Take 1 tablet (10 mg) by mouth daily     Dispense:  90 tablet     Refill:  3     DISCONTD: lisinopril (PRINIVIL/ZESTRIL) 5 MG tablet     Sig: Take 1 tablet (5 mg) by mouth daily     Dispense:  90 tablet     Refill:  3     DISCONTD: atorvastatin (LIPITOR) 10 MG tablet     Sig: Correct sig: take 1 tab (10mg) 5 days/week, take 1/2 tab (5mg) 2 days/week     Dispense:  80 tablet     Refill:  3     DISCONTD: atorvastatin (LIPITOR) 10 MG tablet     Sig: Correct sig: take 1 tab (10mg) 5 days/week, take 1/2 tab (5mg) 2 days/week     Dispense:  80 tablet     Refill:  3     atorvastatin (LIPITOR) 10 MG tablet     Sig: Correct sig: take 1 tab (10mg) 5 days/week, take 1/2 tab (5mg) 2 days/week     Dispense:  80 tablet     Refill:  3     ezetimibe (ZETIA) 10 MG tablet     Sig: Take 1 tablet (10 mg) by mouth daily     Dispense:  90 tablet     Refill:  3     lisinopril (PRINIVIL/ZESTRIL) 5 MG tablet     Sig: Take 1 tablet (5 mg) by mouth daily     Dispense:  90 tablet     Refill:  3     Medications Discontinued During This Encounter   Medication Reason     ezetimibe (ZETIA) 10 MG tablet Reorder     lisinopril (PRINIVIL/ZESTRIL) 5 MG tablet Reorder     atorvastatin (LIPITOR) 10 MG tablet Reorder     atorvastatin (LIPITOR) 10 MG tablet Reorder     atorvastatin (LIPITOR) 10 MG tablet Reorder     ezetimibe (ZETIA) 10 MG tablet Reorder     lisinopril (PRINIVIL/ZESTRIL) 5 MG tablet Reorder         Encounter Diagnoses   Name Primary?     Hyperlipidemia LDL goal <130      Coronary artery disease involving native coronary artery of native heart without angina pectoris      Essential hypertension      Anxiety        CURRENT MEDICATIONS:  Current Outpatient Medications   Medication Sig Dispense Refill     atorvastatin (LIPITOR) 10 MG tablet Correct  sig: take 1 tab (10mg) 5 days/week, take 1/2 tab (5mg) 2 days/week 80 tablet 3     calcium carbonate-vitamin D (CALCIUM + D) 600-200 MG-UNIT TABS Take by mouth daily 60 tablet      Cholecalciferol (VITAMIN D3 PO) Take 1,000 Units by mouth daily. 3x/week        Coenzyme Q10 (COQ-10 PO) Take 100 mg by mouth three times a week        cyanocobalamin (VITAMIN  B-12) 1000 MCG tablet Take 1,000 mcg by mouth daily       ezetimibe (ZETIA) 10 MG tablet Take 1 tablet (10 mg) by mouth daily 90 tablet 3     gabapentin (NEURONTIN) 100 MG capsule Take 300 mg by mouth daily        lisinopril (PRINIVIL/ZESTRIL) 5 MG tablet Take 1 tablet (5 mg) by mouth daily 90 tablet 3     LORazepam (ATIVAN) 1 MG tablet Take 1 tablet (1 mg) by mouth every 8 hours as needed for anxiety 30 tablet 0     MAGNESIUM OXIDE PO Take 250 mg by mouth. 3x/week        melatonin 5 MG tablet Take 5 mg by mouth nightly as needed for sleep Taking 2 tablets PRN       Omega-3 Fatty Acids (OMEGA-3 FISH OIL PO) Take 1 g by mouth daily.         omeprazole (PRILOSEC) 20 MG DR capsule Take 1 capsule (20 mg) by mouth daily 90 capsule 2     traZODone (DESYREL) 100 MG tablet Take 100 mg by mouth At Bedtime       aspirin (ASA) 81 MG tablet Take 1 tablet (81 mg) by mouth daily (Patient not taking: Reported on 10/7/2019)         ALLERGIES     Allergies   Allergen Reactions     Blue Dyes Other (See Comments)     Disperse Blue 106- Blue textile Dye     Myrbetriq [Mirabegron] Rash     Amoxicillin      Balsam [place] [Amberderm]      Clindamycin Hcl      Eugenol      Flu Virus Vaccine      Neomycin Sulfate        PAST MEDICAL HISTORY:  Past Medical History:   Diagnosis Date     Abdominal pain 06/2016    ct abd and pelvis with liver hemangiomas and renal cysts     ASCVD (arteriosclerotic cardiovascular disease) 8/12    angio done and med mgmt, done after abnl ct angio     Aragon's cyst     left     Degenerative disk disease     bulge cervicle disk     DJD (degenerative joint  disease)      GERD (gastroesophageal reflux disease)     egd done     Gilbert's disease     borderline elevated BR     Hemangioma of liver     ct angio with ?liver lesion, then ct of liver showed it be hemangioma, also renal cysts     Hx of colonoscopy 2010    [facility] [place]     Hypercholesteremia      Hypertension      IFG (impaired fasting glucose)      Lumbar spinal stenosis 2018    done in [place], had mri     Obese     prior phen/fen   no valve dz     JOSE (obstructive sleep apnea)     cpap     Other symptoms involving cardiovascular system     bruit     Rosacea      Thyroid nodules     Dr. Guzmán--benign     TMJ (dislocation of temporomandibular joint)     left     Urine incontinence      Vertigo        PAST SURGICAL HISTORY:  Past Surgical History:   Procedure Laterality Date     carpel tunnel surgery       CORONARY ANGIOGRAPHY ADULT ORDER  12    moderate CAD     lid lag surgery       right knee arthroscopy       TONSILLECTOMY  child       FAMILY HISTORY:  Family History   Problem Relation Age of Onset     Cancer - colorectal Father      Heart Disease Father 40        MI     Myocardial Infarction Father      Heart Defect Mother      Heart Surgery Mother         CABG     Heart Surgery Brother         CABG     Heart Surgery Daughter 40        stent placed       SOCIAL HISTORY:  Social History     Socioeconomic History     Marital status:      Spouse name: None     Number of children: 3     Years of education: None     Highest education level: None   Occupational History     Occupation: homemaker   Social Needs     Financial resource strain: None     Food insecurity:     Worry: None     Inability: None     Transportation needs:     Medical: None     Non-medical: None   Tobacco Use     Smoking status: Former Smoker     Packs/day: 1.00     Types: Cigarettes     Start date:      Last attempt to quit: 1960     Years since quittin.3     Smokeless tobacco: Never  "Used   Substance and Sexual Activity     Alcohol use: No     Alcohol/week: 0.0 standard drinks     Drug use: No     Sexual activity: Not Currently   Lifestyle     Physical activity:     Days per week: None     Minutes per session: None     Stress: None   Relationships     Social connections:     Talks on phone: None     Gets together: None     Attends [employer] service: None     Active member of club or organization: None     Attends meetings of clubs or organizations: None     Relationship status: None     Intimate partner violence:     Fear of current or ex partner: None     Emotionally abused: None     Physically abused: None     Forced sexual activity: None   Other Topics Concern     Parent/sibling w/ CABG, MI or angioplasty before 65F 55M? Yes      Service Not Asked     Blood Transfusions Not Asked     Caffeine Concern No     Comment: decaf:  0-2 cups a day     Occupational Exposure Not Asked     Hobby Hazards Not Asked     Sleep Concern Yes     Comment: soemtimes takes lorazepam     Stress Concern Yes     Weight Concern Yes     Special Diet No     Back Care Not Asked     Exercise Yes     Comment: water aerobics x4 days a week     Bike Helmet Not Asked     Seat Belt Yes     Self-Exams Not Asked   Social History Narrative     None       Review of Systems:  Skin:  Negative     Eyes:  Positive for glasses  ENT:  Negative    Respiratory:  Positive for dyspnea on exertion  Cardiovascular:  Negative    Gastroenterology: Positive for reflux  Genitourinary:  Positive for incontinence  Musculoskeletal:  Positive for back pain  Neurologic:  Positive for numbness or tingling of feet  Psychiatric:  Positive for sleep disturbances  Heme/Lymph/Imm:  Positive for allergies  Endocrine:  Negative      Physical Exam:  Vitals: BP 98/67   Pulse 67   Ht 1.613 m (5' 3.5\")   Wt 93.9 kg (207 lb)   BMI 36.09 kg/m      Constitutional:  cooperative, alert and oriented, well developed, well nourished, in no acute distress    "     Skin:  warm and dry to the touch, no apparent skin lesions or masses noted          Head:  normocephalic, no masses or lesions        Eyes:  pupils equal and round, conjunctivae and lids unremarkable, sclera white, no xanthalasma, EOMS intact, no nystagmus        Lymph:No Cervical lymphadenopathy present;No thyromegaly     ENT:  no pallor or cyanosis, dentition good        Neck:  carotid pulses are full and equal bilaterally, JVP normal, no carotid bruit        Respiratory:  normal breath sounds, clear to auscultation, normal A-P diameter, normal symmetry, normal respiratory excursion, no use of accessory muscles         Cardiac: regular rhythm, normal S1/S2, no S3 or S4, apical impulse not displaced, no murmurs, gallops or rubs   distant heart sounds            pulses full and equal, no bruits auscultated                                        GI:  abdomen soft, non-tender, BS normoactive, no mass, no HSM, no bruits obese      Extremities and Muscular Skeletal:  no deformities, clubbing, cyanosis, erythema observed;no edema              Neurological:  no gross motor deficits        Psych:  Alert and Oriented x 3      Recent Lab Results:  LIPID RESULTS:  Lab Results   Component Value Date    CHOL 165 10/04/2019    HDL 71 10/04/2019    LDL 69 10/04/2019    TRIG 127 10/04/2019    CHOLHDLRATIO 2.7 08/25/2015       LIVER ENZYME RESULTS:  Lab Results   Component Value Date    AST 11 06/24/2016    ALT 23 10/04/2019       CBC RESULTS:  Lab Results   Component Value Date    WBC 5.5 09/14/2018    RBC 4.68 09/14/2018    HGB 13.2 09/14/2018    HCT 40.2 09/14/2018    MCV 86 09/14/2018    MCH 28.2 09/14/2018    MCHC 32.8 09/14/2018    RDW 13.0 09/14/2018     09/14/2018       BMP RESULTS:  Lab Results   Component Value Date     10/04/2019    POTASSIUM 4.0 10/04/2019    CHLORIDE 109 10/04/2019    CO2 32 10/04/2019    ANIONGAP 1 (L) 10/04/2019     (H) 10/04/2019    BUN 19 10/04/2019    CR 0.90 10/04/2019     GFRESTIMATED 61 10/04/2019    GFRESTBLACK 70 10/04/2019    YUNIOR 9.4 10/04/2019        A1C RESULTS:  No results found for: A1C    INR RESULTS:  Lab Results   Component Value Date    INR 0.96 08/07/2012           CC  Agus Gill MD  [address]  [place] [id number]

## 2019-10-07 NOTE — PROGRESS NOTES
HPI and Plan:   See dictation    No orders of the defined types were placed in this encounter.    Orders Placed This Encounter   Medications     ezetimibe (ZETIA) 10 MG tablet     Sig: Take 1 tablet (10 mg) by mouth daily     Dispense:  90 tablet     Refill:  3     lisinopril (PRINIVIL/ZESTRIL) 5 MG tablet     Sig: Take 1 tablet (5 mg) by mouth daily     Dispense:  90 tablet     Refill:  3     DISCONTD: atorvastatin (LIPITOR) 10 MG tablet     Sig: Correct sig: take 1 tab (10mg) 5 days/week, take 1/2 tab (5mg) 2 days/week     Dispense:  80 tablet     Refill:  3     atorvastatin (LIPITOR) 10 MG tablet     Sig: Correct sig: take 1 tab (10mg) 5 days/week, take 1/2 tab (5mg) 2 days/week     Dispense:  80 tablet     Refill:  3     Medications Discontinued During This Encounter   Medication Reason     ezetimibe (ZETIA) 10 MG tablet Reorder     lisinopril (PRINIVIL/ZESTRIL) 5 MG tablet Reorder     atorvastatin (LIPITOR) 10 MG tablet Reorder     atorvastatin (LIPITOR) 10 MG tablet Reorder         Encounter Diagnoses   Name Primary?     Hyperlipidemia LDL goal <130      Coronary artery disease involving native coronary artery of native heart without angina pectoris      Essential hypertension      Anxiety        CURRENT MEDICATIONS:  Current Outpatient Medications   Medication Sig Dispense Refill     atorvastatin (LIPITOR) 10 MG tablet Correct sig: take 1 tab (10mg) 5 days/week, take 1/2 tab (5mg) 2 days/week 80 tablet 3     calcium carbonate-vitamin D (CALCIUM + D) 600-200 MG-UNIT TABS Take by mouth daily 60 tablet      Cholecalciferol (VITAMIN D3 PO) Take 1,000 Units by mouth daily. 3x/week        Coenzyme Q10 (COQ-10 PO) Take 100 mg by mouth three times a week        cyanocobalamin (VITAMIN  B-12) 1000 MCG tablet Take 1,000 mcg by mouth daily       ezetimibe (ZETIA) 10 MG tablet Take 1 tablet (10 mg) by mouth daily 90 tablet 3     gabapentin (NEURONTIN) 100 MG capsule Take 300 mg by mouth daily        lisinopril  (PRINIVIL/ZESTRIL) 5 MG tablet Take 1 tablet (5 mg) by mouth daily 90 tablet 3     LORazepam (ATIVAN) 1 MG tablet Take 1 tablet (1 mg) by mouth every 8 hours as needed for anxiety 30 tablet 0     MAGNESIUM OXIDE PO Take 250 mg by mouth. 3x/week        melatonin 5 MG tablet Take 5 mg by mouth nightly as needed for sleep Taking 2 tablets PRN       Omega-3 Fatty Acids (OMEGA-3 FISH OIL PO) Take 1 g by mouth daily.         omeprazole (PRILOSEC) 20 MG DR capsule Take 1 capsule (20 mg) by mouth daily 90 capsule 2     traZODone (DESYREL) 100 MG tablet Take 100 mg by mouth At Bedtime       aspirin (ASA) 81 MG tablet Take 1 tablet (81 mg) by mouth daily (Patient not taking: Reported on 10/7/2019)         ALLERGIES     Allergies   Allergen Reactions     Blue Dyes Other (See Comments)     Disperse Blue 106- Blue textile Dye     Myrbetriq [Mirabegron] Rash     Amoxicillin      Balsam [place] [Amberderm]      Clindamycin Hcl      Eugenol      Flu Virus Vaccine      Neomycin Sulfate        PAST MEDICAL HISTORY:  Past Medical History:   Diagnosis Date     Abdominal pain 06/2016    ct abd and pelvis with liver hemangiomas and renal cysts     ASCVD (arteriosclerotic cardiovascular disease) 8/12    angio done and med mgmt, done after abnl ct angio     Aragon's cyst     left     Degenerative disk disease     bulge cervicle disk     DJD (degenerative joint disease)      GERD (gastroesophageal reflux disease) 2002    egd done     Gilbert's disease     borderline elevated BR     Hemangioma of liver 9/12    ct angio with ?liver lesion, then ct of liver showed it be hemangioma, also renal cysts     Hx of colonoscopy 2010    [facility]     Hypercholesteremia      Hypertension      IFG (impaired fasting glucose)      Lumbar spinal stenosis 2018    done in [place], had mri     Obese     prior phen/fen   no valve dz     JOSE (obstructive sleep apnea) 2008    cpap     Other symptoms involving cardiovascular system     bruit     Rosacea       Thyroid nodules     Dr. Guzmán--benign     TMJ (dislocation of temporomandibular joint)     left     Urine incontinence      Vertigo        PAST SURGICAL HISTORY:  Past Surgical History:   Procedure Laterality Date     carpel tunnel surgery  2011     CORONARY ANGIOGRAPHY ADULT ORDER  12    moderate CAD     lid lag surgery  2004     right knee arthroscopy  1999     TONSILLECTOMY  child       FAMILY HISTORY:  Family History   Problem Relation Age of Onset     Cancer - colorectal Father      Heart Disease Father 40        MI     Myocardial Infarction Father      Heart Defect Mother      Heart Surgery Mother         CABG     Heart Surgery Brother         CABG     Heart Surgery Daughter 40        stent placed       SOCIAL HISTORY:  Social History     Socioeconomic History     Marital status:      Spouse name: None     Number of children: 3     Years of education: None     Highest education level: None   Occupational History     Occupation: homemaker   Social Needs     Financial resource strain: None     Food insecurity:     Worry: None     Inability: None     Transportation needs:     Medical: None     Non-medical: None   Tobacco Use     Smoking status: Former Smoker     Packs/day: 1.00     Types: Cigarettes     Start date:      Last attempt to quit: 1960     Years since quittin.3     Smokeless tobacco: Never Used   Substance and Sexual Activity     Alcohol use: No     Alcohol/week: 0.0 standard drinks     Drug use: No     Sexual activity: Not Currently   Lifestyle     Physical activity:     Days per week: None     Minutes per session: None     Stress: None   Relationships     Social connections:     Talks on phone: None     Gets together: None     Attends Yarsani service: None     Active member of club or organization: None     Attends meetings of clubs or organizations: None     Relationship status: None     Intimate partner violence:     Fear of current or ex partner: None      "Emotionally abused: None     Physically abused: None     Forced sexual activity: None   Other Topics Concern     Parent/sibling w/ CABG, MI or angioplasty before 65F 55M? Yes      Service Not Asked     Blood Transfusions Not Asked     Caffeine Concern No     Comment: decaf:  0-2 cups a day     Occupational Exposure Not Asked     Hobby Hazards Not Asked     Sleep Concern Yes     Comment: soemtimes takes lorazepam     Stress Concern Yes     Weight Concern Yes     Special Diet No     Back Care Not Asked     Exercise Yes     Comment: water aerobics x4 days a week     Bike Helmet Not Asked     Seat Belt Yes     Self-Exams Not Asked   Social History Narrative     None       Review of Systems:  Skin:  Negative     Eyes:  Positive for glasses  ENT:  Negative    Respiratory:  Positive for dyspnea on exertion  Cardiovascular:  Negative    Gastroenterology: Positive for reflux  Genitourinary:  Positive for incontinence  Musculoskeletal:  Positive for back pain  Neurologic:  Positive for numbness or tingling of feet  Psychiatric:  Positive for sleep disturbances  Heme/Lymph/Imm:  Positive for allergies  Endocrine:  Negative      Physical Exam:  Vitals: BP 98/67   Pulse 67   Ht 1.613 m (5' 3.5\")   Wt 93.9 kg (207 lb)   BMI 36.09 kg/m      Constitutional:  cooperative, alert and oriented, well developed, well nourished, in no acute distress        Skin:  warm and dry to the touch, no apparent skin lesions or masses noted          Head:  normocephalic, no masses or lesions        Eyes:  pupils equal and round, conjunctivae and lids unremarkable, sclera white, no xanthalasma, EOMS intact, no nystagmus        Lymph:No Cervical lymphadenopathy present;No thyromegaly     ENT:  no pallor or cyanosis, dentition good        Neck:  carotid pulses are full and equal bilaterally, JVP normal, no carotid bruit        Respiratory:  normal breath sounds, clear to auscultation, normal A-P diameter, normal symmetry, normal respiratory " excursion, no use of accessory muscles         Cardiac: regular rhythm, normal S1/S2, no S3 or S4, apical impulse not displaced, no murmurs, gallops or rubs   distant heart sounds            pulses full and equal, no bruits auscultated                                        GI:  abdomen soft, non-tender, BS normoactive, no mass, no HSM, no bruits obese      Extremities and Muscular Skeletal:  no deformities, clubbing, cyanosis, erythema observed;no edema              Neurological:  no gross motor deficits        Psych:  Alert and Oriented x 3      Recent Lab Results:  LIPID RESULTS:  Lab Results   Component Value Date    CHOL 165 10/04/2019    HDL 71 10/04/2019    LDL 69 10/04/2019    TRIG 127 10/04/2019    CHOLHDLRATIO 2.7 08/25/2015       LIVER ENZYME RESULTS:  Lab Results   Component Value Date    AST 11 06/24/2016    ALT 23 10/04/2019       CBC RESULTS:  Lab Results   Component Value Date    WBC 5.5 09/14/2018    RBC 4.68 09/14/2018    HGB 13.2 09/14/2018    HCT 40.2 09/14/2018    MCV 86 09/14/2018    MCH 28.2 09/14/2018    MCHC 32.8 09/14/2018    RDW 13.0 09/14/2018     09/14/2018       BMP RESULTS:  Lab Results   Component Value Date     10/04/2019    POTASSIUM 4.0 10/04/2019    CHLORIDE 109 10/04/2019    CO2 32 10/04/2019    ANIONGAP 1 (L) 10/04/2019     (H) 10/04/2019    BUN 19 10/04/2019    CR 0.90 10/04/2019    GFRESTIMATED 61 10/04/2019    GFRESTBLACK 70 10/04/2019    YUNIOR 9.4 10/04/2019        A1C RESULTS:  No results found for: A1C    INR RESULTS:  Lab Results   Component Value Date    INR 0.96 08/07/2012           CC  Agus Gill MD  [id number]  [place] [id number]

## 2019-10-14 DIAGNOSIS — I25.10 CORONARY ARTERY DISEASE INVOLVING NATIVE CORONARY ARTERY OF NATIVE HEART WITHOUT ANGINA PECTORIS: ICD-10-CM

## 2019-10-14 RX ORDER — ATORVASTATIN CALCIUM 10 MG/1
TABLET, FILM COATED ORAL
Qty: 80 TABLET | Refills: 3 | Status: SHIPPED | OUTPATIENT
Start: 2019-10-14 | End: 2020-11-18

## 2019-11-14 ENCOUNTER — TRANSFERRED RECORDS (OUTPATIENT)
Dept: HEALTH INFORMATION MANAGEMENT | Facility: CLINIC | Age: 79
End: 2019-11-14

## 2019-12-18 DIAGNOSIS — E78.5 HYPERLIPIDEMIA LDL GOAL <130: ICD-10-CM

## 2019-12-18 DIAGNOSIS — I25.10 CAD (CORONARY ARTERY DISEASE): ICD-10-CM

## 2020-03-03 ENCOUNTER — TRANSFERRED RECORDS (OUTPATIENT)
Dept: HEALTH INFORMATION MANAGEMENT | Facility: CLINIC | Age: 80
End: 2020-03-03

## 2020-06-18 ENCOUNTER — TRANSFERRED RECORDS (OUTPATIENT)
Dept: HEALTH INFORMATION MANAGEMENT | Facility: CLINIC | Age: 80
End: 2020-06-18

## 2020-06-22 ENCOUNTER — TRANSFERRED RECORDS (OUTPATIENT)
Dept: HEALTH INFORMATION MANAGEMENT | Facility: CLINIC | Age: 80
End: 2020-06-22

## 2020-11-18 DIAGNOSIS — I25.10 CORONARY ARTERY DISEASE INVOLVING NATIVE CORONARY ARTERY OF NATIVE HEART WITHOUT ANGINA PECTORIS: ICD-10-CM

## 2020-11-18 RX ORDER — ATORVASTATIN CALCIUM 10 MG/1
TABLET, FILM COATED ORAL
Qty: 80 TABLET | Refills: 0 | Status: SHIPPED | OUTPATIENT
Start: 2020-11-18 | End: 2021-09-15

## 2020-12-04 ENCOUNTER — NEW PATIENT (OUTPATIENT)
Dept: URBAN - METROPOLITAN AREA CLINIC 37 | Facility: CLINIC | Age: 80
End: 2020-12-04
Payer: MEDICARE

## 2020-12-04 DIAGNOSIS — H02.056 TRICHIASIS WITHOUT ENTROPION LEFT EYE, UNSPECIFIED EYELID: Primary | ICD-10-CM

## 2020-12-04 PROCEDURE — 92002 INTRM OPH EXAM NEW PATIENT: CPT | Performed by: OPTOMETRIST

## 2020-12-04 RX ORDER — NEOMYCIN SULFATE, POLYMYXIN B SULFATE AND DEXAMETHASONE 3.5; 10000; 1 MG/ML; [USP'U]/ML; MG/ML
SUSPENSION OPHTHALMIC
Qty: 5 | Refills: 0 | Status: INACTIVE
Start: 2020-12-04 | End: 2021-02-03

## 2020-12-04 ASSESSMENT — INTRAOCULAR PRESSURE
OS: 21
OD: 17

## 2021-01-15 ENCOUNTER — HEALTH MAINTENANCE LETTER (OUTPATIENT)
Age: 81
End: 2021-01-15

## 2021-02-03 ENCOUNTER — FOLLOW UP ESTABLISHED (OUTPATIENT)
Dept: URBAN - METROPOLITAN AREA CLINIC 37 | Facility: CLINIC | Age: 81
End: 2021-02-03
Payer: MEDICARE

## 2021-02-03 DIAGNOSIS — H02.31 BLEPHAROCHALASIS OF RIGHT UPPER LID: Primary | ICD-10-CM

## 2021-02-03 DIAGNOSIS — H02.34 BLEPHAROCHALASIS OF LEFT UPPER LID: ICD-10-CM

## 2021-02-03 DIAGNOSIS — H25.13 AGE-RELATED NUCLEAR CATARACT, BILATERAL: ICD-10-CM

## 2021-02-03 PROCEDURE — 99214 OFFICE O/P EST MOD 30 MIN: CPT | Performed by: OPTOMETRIST

## 2021-02-03 ASSESSMENT — VISUAL ACUITY
OD: 20/30
OS: 20/25

## 2021-02-03 ASSESSMENT — INTRAOCULAR PRESSURE
OS: 18
OD: 18

## 2021-03-06 ENCOUNTER — TRANSFERRED RECORDS (OUTPATIENT)
Dept: HEALTH INFORMATION MANAGEMENT | Facility: CLINIC | Age: 81
End: 2021-03-06

## 2021-03-24 ENCOUNTER — RX CHECK (OUTPATIENT)
Dept: URBAN - METROPOLITAN AREA CLINIC 37 | Facility: CLINIC | Age: 81
End: 2021-03-24
Payer: MEDICARE

## 2021-03-24 PROCEDURE — 92015 DETERMINE REFRACTIVE STATE: CPT | Performed by: OPTOMETRIST

## 2021-03-24 ASSESSMENT — VISUAL ACUITY
OD: 20/30
OS: 20/20

## 2021-06-18 ENCOUNTER — TRANSFERRED RECORDS (OUTPATIENT)
Dept: HEALTH INFORMATION MANAGEMENT | Facility: CLINIC | Age: 81
End: 2021-06-18

## 2021-06-23 ENCOUNTER — TRANSFERRED RECORDS (OUTPATIENT)
Dept: HEALTH INFORMATION MANAGEMENT | Facility: CLINIC | Age: 81
End: 2021-06-23

## 2021-07-09 ENCOUNTER — OFFICE VISIT (OUTPATIENT)
Dept: FAMILY MEDICINE | Facility: CLINIC | Age: 81
End: 2021-07-09
Payer: MEDICARE

## 2021-07-09 VITALS
HEART RATE: 80 BPM | BODY MASS INDEX: 34.31 KG/M2 | OXYGEN SATURATION: 97 % | HEIGHT: 64 IN | TEMPERATURE: 97.7 F | DIASTOLIC BLOOD PRESSURE: 79 MMHG | WEIGHT: 201 LBS | SYSTOLIC BLOOD PRESSURE: 130 MMHG

## 2021-07-09 DIAGNOSIS — H61.23 BILATERAL IMPACTED CERUMEN: Primary | ICD-10-CM

## 2021-07-09 DIAGNOSIS — E66.01 MORBID OBESITY (H): ICD-10-CM

## 2021-07-09 DIAGNOSIS — R25.2 LEG CRAMPS: ICD-10-CM

## 2021-07-09 PROCEDURE — 99213 OFFICE O/P EST LOW 20 MIN: CPT | Performed by: INTERNAL MEDICINE

## 2021-07-09 RX ORDER — CYCLOBENZAPRINE HCL 5 MG
2.5 TABLET ORAL AT BEDTIME
Start: 2021-07-09 | End: 2021-08-06

## 2021-07-09 ASSESSMENT — MIFFLIN-ST. JEOR: SCORE: 1358.79

## 2021-07-09 NOTE — PROGRESS NOTES
The patient was at [facility] to get her hearing checked and they told her she needed to get her ears cleaned out.  She does have some hearing loss but no pain or cold symptoms.    She also has weight issues although she is down a little bit.    The patient notes for years nocturnal leg cramps in the lower legs.  It feels like the muscles are .  She has been to neurology and they have her on Flexeril and gabapentin.  She has no symptoms during the day and does not get claudication.  No chest pain.    Past Medical History:   Diagnosis Date     Abdominal pain 06/2016    ct abd and pelvis with liver hemangiomas and renal cysts     ASCVD (arteriosclerotic cardiovascular disease) 8/12    angio done and med mgmt, done after abnl ct angio     Aragon's cyst     left     Degenerative disk disease     bulge cervicle disk     DJD (degenerative joint disease)      GERD (gastroesophageal reflux disease) 2002    egd done     Gilbert's disease     borderline elevated BR     Hemangioma of liver 9/12    ct angio with ?liver lesion, then ct of liver showed it be hemangioma, also renal cysts     Hx of colonoscopy 2010    [facility] [place]     Hypercholesteremia      Hypertension      IFG (impaired fasting glucose)      Lumbar spinal stenosis 2018    done in [place], had mri     Obese     prior phen/fen   no valve dz     JOSE (obstructive sleep apnea) 2008    cpap     Other symptoms involving cardiovascular system     bruit     Rosacea      Thyroid nodules 2011    Dr. Guzmán--benign     TMJ (dislocation of temporomandibular joint)     left     Urine incontinence      Vertigo      Past Surgical History:   Procedure Laterality Date     carpel tunnel surgery  2011     CORONARY ANGIOGRAPHY ADULT ORDER  8/7/12    moderate CAD     lid lag surgery  2004     right knee arthroscopy  1999     TONSILLECTOMY  child     Social History     Socioeconomic History     Marital status:      Spouse name: Not on file     Number of children: 3      Years of education: Not on file     Highest education level: Not on file   Occupational History     Occupation: homemaker   Social Needs     Financial resource strain: Not on file     Food insecurity     Worry: Not on file     Inability: Not on file     Transportation needs     Medical: Not on file     Non-medical: Not on file   Tobacco Use     Smoking status: Former Smoker     Packs/day: 1.00     Types: Cigarettes     Start date:      Quit date: 1960     Years since quittin.1     Smokeless tobacco: Never Used   Substance and Sexual Activity     Alcohol use: No     Alcohol/week: 0.0 standard drinks     Drug use: No     Sexual activity: Not Currently   Lifestyle     Physical activity     Days per week: Not on file     Minutes per session: Not on file     Stress: Not on file   Relationships     Social connections     Talks on phone: Not on file     Gets together: Not on file     Attends Sikhism service: Not on file     Active member of club or organization: Not on file     Attends meetings of clubs or organizations: Not on file     Relationship status: Not on file     Intimate partner violence     Fear of current or ex partner: Not on file     Emotionally abused: Not on file     Physically abused: Not on file     Forced sexual activity: Not on file   Other Topics Concern     Parent/sibling w/ CABG, MI or angioplasty before 65F 55M? Yes      Service Not Asked     Blood Transfusions Not Asked     Caffeine Concern No     Comment: decaf:  0-2 cups a day     Occupational Exposure Not Asked     Hobby Hazards Not Asked     Sleep Concern Yes     Comment: soemtimes takes lorazepam     Stress Concern Yes     Weight Concern Yes     Special Diet No     Back Care Not Asked     Exercise Yes     Comment: water aerobics x4 days a week     Bike Helmet Not Asked     Seat Belt Yes     Self-Exams Not Asked   Social History Narrative     Not on file     Current Outpatient Medications   Medication Sig Dispense  "Refill     atorvastatin (LIPITOR) 10 MG tablet Correct sig: take 1 tab (10mg) 5 days/week, take 1/2 tab (5mg) 2 days/week 80 tablet 0     calcium carbonate-vitamin D (CALCIUM + D) 600-200 MG-UNIT TABS Take by mouth daily 60 tablet      Cholecalciferol (VITAMIN D3 PO) Take 1,000 Units by mouth daily. 3x/week        Coenzyme Q10 (COQ-10 PO) Take 100 mg by mouth three times a week        cyanocobalamin (VITAMIN  B-12) 1000 MCG tablet Take 1,000 mcg by mouth daily       ezetimibe (ZETIA) 10 MG tablet Take 1 tablet (10 mg) by mouth daily 90 tablet 3     gabapentin (NEURONTIN) 100 MG capsule Take 300 mg by mouth daily        LORazepam (ATIVAN) 1 MG tablet Take 1 tablet (1 mg) by mouth every 8 hours as needed for anxiety 30 tablet 0     MAGNESIUM OXIDE PO Take 250 mg by mouth. 3x/week        omeprazole (PRILOSEC) 20 MG DR capsule Take 1 capsule (20 mg) by mouth daily 90 capsule 2     Allergies   Allergen Reactions     Blue Dyes Other (See Comments)     Disperse Blue 106- Blue textile Dye     Myrbetriq [Mirabegron] Rash     Amoxicillin      Balsam [place] [Amberderm]      Clindamycin Hcl      Eugenol      Flu Virus Vaccine      Neomycin Sulfate      FAMILY HISTORY NOTED AND REVIEWED    REVIEW OF SYSTEMS: above    PHYSICAL EXAM    /79 (BP Location: Right arm, Patient Position: Sitting, Cuff Size: Adult Regular)   Pulse 80   Temp 97.7  F (36.5  C) (Temporal)   Ht 1.613 m (5' 3.5\")   Wt 91.2 kg (201 lb)   SpO2 97%   BMI 35.05 kg/m      Patient appears non toxic  Wax in canals bilat  Legs not red, warm or tender, dec pulses  cv reglar rate and rhythm    ASSESSMENT:  1. Cerumen, to be flushed  2. Leg cramps, does not sound like brett, may be statin  3. Obesity, weight loss    PLAN:  Flush ears  Stop statin and send me update 4 weeks  Weight loss    Shawn Méndez M.D.          "

## 2021-08-05 ENCOUNTER — TELEPHONE (OUTPATIENT)
Dept: FAMILY MEDICINE | Facility: CLINIC | Age: 81
End: 2021-08-05

## 2021-08-05 DIAGNOSIS — R25.2 LEG CRAMPS: ICD-10-CM

## 2021-08-05 NOTE — TELEPHONE ENCOUNTER
TO PCP:     Pt called, previously went off her statin due to leg cramps. States she is pretty sure it's from her low back pain. Cramps didn't change after stopping this med. Asking if okay to resume statin?     Also - Sleep medicine provider started her on: Cyclobenzaprine - takes 1/2 of 5mg tab at bedtime (already historical on list) - for spasms at night, not really working - wondering if she should increase to a full tab?     Call back: [phone] - detailed message is okay     Mariza MCKEON RN

## 2021-08-06 RX ORDER — CYCLOBENZAPRINE HCL 5 MG
5 TABLET ORAL AT BEDTIME
COMMUNITY
Start: 2021-08-06 | End: 2022-08-15

## 2021-08-06 NOTE — TELEPHONE ENCOUNTER
Called home phone,  answered   No C2C on file. He said can try mobile number    Spoke with patient on her mobile phone and notified her of of PCP's response below    Mariza MCKEON RN

## 2021-08-30 ENCOUNTER — TRANSFERRED RECORDS (OUTPATIENT)
Dept: HEALTH INFORMATION MANAGEMENT | Facility: CLINIC | Age: 81
End: 2021-08-30

## 2021-09-05 ENCOUNTER — HEALTH MAINTENANCE LETTER (OUTPATIENT)
Age: 81
End: 2021-09-05

## 2021-09-13 DIAGNOSIS — I25.10 CORONARY ARTERY DISEASE INVOLVING NATIVE CORONARY ARTERY OF NATIVE HEART WITHOUT ANGINA PECTORIS: ICD-10-CM

## 2021-09-13 RX ORDER — ATORVASTATIN CALCIUM 10 MG/1
TABLET, FILM COATED ORAL
Qty: 80 TABLET | Refills: 0 | Status: CANCELLED | OUTPATIENT
Start: 2021-09-13

## 2021-09-14 NOTE — TELEPHONE ENCOUNTER
LOV 7-9-2021 [facility] - stop statin update in 1 month  Telephone update 8-5-2021 continue no statin for 2 more weeks, update again NO UPDATE    Needs triage for update    Pharmacy seeking refill - verify dose?    RT Buster (R)

## 2021-09-15 RX ORDER — ATORVASTATIN CALCIUM 10 MG/1
TABLET, FILM COATED ORAL
Qty: 80 TABLET | Refills: 0 | Status: SHIPPED | OUTPATIENT
Start: 2021-09-15 | End: 2022-08-15 | Stop reason: ALTCHOICE

## 2021-09-15 NOTE — TELEPHONE ENCOUNTER
Called patient regarding Atorvastatin.   Patient confirmed out of the med---needs refills.     Patient states she restarted Atorvastatin mid-August, however decided to take a different, lower daily dose:  5mg (1/2 of the 10 mg tab) daily in the morning.     Patient reports no leg cramps since restarting the med and lubna since taking in the morning.     Currently has lab appointment 21 for FLP ordered by Dr Glil.    Question---patient asking if she should resume the current sig:    Atorvastatin 10 m tab 5 days/wk and 1/2 tab (5 mg) 2 days/wk.    Or continue 5 mg daily?      Rx pended with current ordered dose and pharmacy, however please change sig if needed.     Then route back for patient follow up.     [phone]  May leave detailed msg/orders on voice mail.     Lian ELDRIDGE, RN      September 15, 2021  9:02 AM

## 2021-09-21 DIAGNOSIS — E78.5 HYPERLIPIDEMIA LDL GOAL <130: Primary | ICD-10-CM

## 2021-09-21 DIAGNOSIS — I10 ESSENTIAL HYPERTENSION: ICD-10-CM

## 2021-09-27 ENCOUNTER — LAB (OUTPATIENT)
Dept: LAB | Facility: CLINIC | Age: 81
End: 2021-09-27
Payer: MEDICARE

## 2021-09-27 DIAGNOSIS — I10 ESSENTIAL HYPERTENSION: ICD-10-CM

## 2021-09-27 DIAGNOSIS — E78.5 HYPERLIPIDEMIA LDL GOAL <130: ICD-10-CM

## 2021-09-27 LAB
ALT SERPL W P-5'-P-CCNC: 23 U/L (ref 0–50)
ANION GAP SERPL CALCULATED.3IONS-SCNC: 3 MMOL/L (ref 3–14)
BUN SERPL-MCNC: 14 MG/DL (ref 7–30)
CALCIUM SERPL-MCNC: 9 MG/DL (ref 8.5–10.1)
CHLORIDE BLD-SCNC: 107 MMOL/L (ref 94–109)
CHOLEST SERPL-MCNC: 152 MG/DL
CO2 SERPL-SCNC: 29 MMOL/L (ref 20–32)
CREAT SERPL-MCNC: 1.03 MG/DL (ref 0.52–1.04)
FASTING STATUS PATIENT QL REPORTED: YES
GFR SERPL CREATININE-BSD FRML MDRD: 51 ML/MIN/1.73M2
GLUCOSE BLD-MCNC: 98 MG/DL (ref 70–99)
HDLC SERPL-MCNC: 77 MG/DL
LDLC SERPL CALC-MCNC: 54 MG/DL
NONHDLC SERPL-MCNC: 75 MG/DL
POTASSIUM BLD-SCNC: 4 MMOL/L (ref 3.4–5.3)
SODIUM SERPL-SCNC: 139 MMOL/L (ref 133–144)
TRIGL SERPL-MCNC: 106 MG/DL

## 2021-09-27 PROCEDURE — 36415 COLL VENOUS BLD VENIPUNCTURE: CPT | Performed by: INTERNAL MEDICINE

## 2021-09-27 PROCEDURE — 84460 ALANINE AMINO (ALT) (SGPT): CPT | Performed by: INTERNAL MEDICINE

## 2021-09-27 PROCEDURE — 80061 LIPID PANEL: CPT | Performed by: INTERNAL MEDICINE

## 2021-09-27 PROCEDURE — 80048 BASIC METABOLIC PNL TOTAL CA: CPT | Performed by: INTERNAL MEDICINE

## 2021-10-07 ENCOUNTER — OFFICE VISIT (OUTPATIENT)
Dept: CARDIOLOGY | Facility: CLINIC | Age: 81
End: 2021-10-07
Payer: MEDICARE

## 2021-10-07 VITALS
SYSTOLIC BLOOD PRESSURE: 124 MMHG | WEIGHT: 199 LBS | HEART RATE: 79 BPM | HEIGHT: 64 IN | DIASTOLIC BLOOD PRESSURE: 83 MMHG | BODY MASS INDEX: 33.97 KG/M2

## 2021-10-07 DIAGNOSIS — R06.09 DYSPNEA ON EXERTION: Primary | ICD-10-CM

## 2021-10-07 DIAGNOSIS — I25.810 CORONARY ARTERY DISEASE INVOLVING AUTOLOGOUS ARTERY CORONARY BYPASS GRAFT, UNSPECIFIED WHETHER ANGINA PRESENT: ICD-10-CM

## 2021-10-07 PROCEDURE — 99214 OFFICE O/P EST MOD 30 MIN: CPT | Performed by: INTERNAL MEDICINE

## 2021-10-07 RX ORDER — TRAZODONE HYDROCHLORIDE 100 MG/1
150 TABLET ORAL AT BEDTIME
COMMUNITY

## 2021-10-07 ASSESSMENT — MIFFLIN-ST. JEOR: SCORE: 1349.79

## 2021-10-07 NOTE — PROGRESS NOTES
HPI and Plan:   See dictation    No orders of the defined types were placed in this encounter.    Orders Placed This Encounter   Medications     traZODone (DESYREL) 100 MG tablet     Sig: Take 100 mg by mouth At Bedtime     There are no discontinued medications.      No diagnosis found.    CURRENT MEDICATIONS:  Current Outpatient Medications   Medication Sig Dispense Refill     atorvastatin (LIPITOR) 10 MG tablet Correct sig: take 1 tab (10mg) 5 days/week, take 1/2 tab (5mg) 2 days/week 80 tablet 0     Cholecalciferol (VITAMIN D3 PO) Take 1,000 Units by mouth daily. 3x/week        Coenzyme Q10 (COQ-10 PO) Take 100 mg by mouth three times a week        cyanocobalamin (VITAMIN  B-12) 1000 MCG tablet Take 1,000 mcg by mouth daily       cyclobenzaprine (FLEXERIL) 5 MG tablet Take 1 tablet (5 mg) by mouth At Bedtime       ezetimibe (ZETIA) 10 MG tablet Take 1 tablet (10 mg) by mouth daily 90 tablet 3     gabapentin (NEURONTIN) 100 MG capsule Take 300 mg by mouth daily        LORazepam (ATIVAN) 1 MG tablet Take 1 tablet (1 mg) by mouth every 8 hours as needed for anxiety 30 tablet 0     MAGNESIUM OXIDE PO Take 250 mg by mouth. 3x/week        omeprazole (PRILOSEC) 20 MG DR capsule Take 1 capsule (20 mg) by mouth daily 90 capsule 2     traZODone (DESYREL) 100 MG tablet Take 100 mg by mouth At Bedtime       calcium carbonate-vitamin D (CALCIUM + D) 600-200 MG-UNIT TABS Take by mouth daily (Patient not taking: Reported on 10/7/2021) 60 tablet        ALLERGIES     Allergies   Allergen Reactions     Blue Dyes Other (See Comments)     Disperse Blue 106- Blue textile Dye     Myrbetriq [Mirabegron] Rash     Amoxicillin      Balsam [place] [Amberderm]      Clindamycin Hcl      Eugenol      Flu Virus Vaccine      Neomycin Sulfate        PAST MEDICAL HISTORY:  Past Medical History:   Diagnosis Date     Abdominal pain 06/2016    ct abd and pelvis with liver hemangiomas and renal cysts     ASCVD (arteriosclerotic cardiovascular  disease)     angio done and med mgmt, done after abnl ct angio     Aragon's cyst     left     Degenerative disk disease     bulge cervicle disk     DJD (degenerative joint disease)      GERD (gastroesophageal reflux disease)     egd done     Gilbert's disease     borderline elevated BR     Hemangioma of liver     ct angio with ?liver lesion, then ct of liver showed it be hemangioma, also renal cysts     Hx of colonoscopy 2010    [facility] [place]     Hypercholesteremia      Hypertension      IFG (impaired fasting glucose)      Lumbar spinal stenosis 2018    done in [place], had mri     Obese     prior phen/fen   no valve dz     JOSE (obstructive sleep apnea) 2008    cpap     Other symptoms involving cardiovascular system     bruit     Rosacea      Thyroid nodules     Dr. Guzmán--benign     TMJ (dislocation of temporomandibular joint)     left     Urine incontinence      Vertigo        PAST SURGICAL HISTORY:  Past Surgical History:   Procedure Laterality Date     carpel tunnel surgery       CORONARY ANGIOGRAPHY ADULT ORDER  12    moderate CAD     lid lag surgery  2004     right knee arthroscopy  1999     TONSILLECTOMY  child       FAMILY HISTORY:  Family History   Problem Relation Age of Onset     Cancer - colorectal Father      Heart Disease Father 40        MI     Myocardial Infarction Father      Heart Defect Mother      Heart Surgery Mother         CABG     Heart Surgery Brother         CABG     Heart Surgery Daughter 40        stent placed       SOCIAL HISTORY:  Social History     Socioeconomic History     Marital status:      Spouse name: None     Number of children: 3     Years of education: None     Highest education level: None   Occupational History     Occupation: homemaker   Tobacco Use     Smoking status: Former Smoker     Packs/day: 1.00     Types: Cigarettes     Start date:      Quit date: 1960     Years since quittin.3     Smokeless tobacco: Never Used    Substance and Sexual Activity     Alcohol use: No     Alcohol/week: 0.0 standard drinks     Drug use: No     Sexual activity: Not Currently   Other Topics Concern     Parent/sibling w/ CABG, MI or angioplasty before 65F 55M? Yes      Service Not Asked     Blood Transfusions Not Asked     Caffeine Concern No     Comment: decaf:  0-2 cups a day     Occupational Exposure Not Asked     Hobby Hazards Not Asked     Sleep Concern Yes     Comment: soemtimes takes lorazepam     Stress Concern Yes     Weight Concern Yes     Special Diet No     Back Care Not Asked     Exercise Yes     Comment: water aerobics x4 days a week     Bike Helmet Not Asked     Seat Belt Yes     Self-Exams Not Asked   Social History Narrative     None     Social Determinants of Health     Financial Resource Strain:      Difficulty of Paying Living Expenses:    Food Insecurity:      Worried About Running Out of Food in the Last Year:      Ran Out of Food in the Last Year:    Transportation Needs:      Lack of Transportation (Medical):      Lack of Transportation (Non-Medical):    Physical Activity:      Days of Exercise per Week:      Minutes of Exercise per Session:    Stress:      Feeling of Stress :    Social Connections:      Frequency of Communication with Friends and Family:      Frequency of Social Gatherings with Friends and Family:      Attends Anabaptist Services:      Active Member of Clubs or Organizations:      Attends Club or Organization Meetings:      Marital Status:    Intimate Partner Violence:      Fear of Current or Ex-Partner:      Emotionally Abused:      Physically Abused:      Sexually Abused:        Review of Systems:  Skin:  Negative     Eyes:  Positive for glasses  ENT:  Negative    Respiratory:  Positive for dyspnea on exertion  Cardiovascular:  Negative for;lightheadedness;dizziness;palpitations;chest pain  (mid sternal x 1)  Gastroenterology: Positive for reflux  Genitourinary:  Positive for  "incontinence  Musculoskeletal:  Positive for back pain  Neurologic:  Positive for numbness or tingling of feet  Psychiatric:  Positive for sleep disturbances  Heme/Lymph/Imm:  Positive for allergies  Endocrine:  Negative      Physical Exam:  Vitals: /83   Pulse 79   Ht 1.613 m (5' 3.5\")   Wt 90.3 kg (199 lb)   BMI 34.69 kg/m      Constitutional:           Skin:             Head:           Eyes:           Lymph:      ENT:           Neck:           Respiratory:            Cardiac:                                                           GI:           Extremities and Muscular Skeletal:                 Neurological:           Psych:         Recent Lab Results:  LIPID RESULTS:  Lab Results   Component Value Date    CHOL 152 09/27/2021    CHOL 165 10/04/2019    HDL 77 09/27/2021    HDL 71 10/04/2019    LDL 54 09/27/2021    LDL 69 10/04/2019    TRIG 106 09/27/2021    TRIG 127 10/04/2019    CHOLHDLRATIO 2.7 08/25/2015       LIVER ENZYME RESULTS:  Lab Results   Component Value Date    AST 11 06/24/2016    ALT 23 09/27/2021    ALT 23 10/04/2019       CBC RESULTS:  Lab Results   Component Value Date    WBC 5.5 09/14/2018    RBC 4.68 09/14/2018    HGB 13.2 09/14/2018    HCT 40.2 09/14/2018    MCV 86 09/14/2018    MCH 28.2 09/14/2018    MCHC 32.8 09/14/2018    RDW 13.0 09/14/2018     09/14/2018       BMP RESULTS:  Lab Results   Component Value Date     09/27/2021     10/04/2019    POTASSIUM 4.0 09/27/2021    POTASSIUM 4.0 10/04/2019    CHLORIDE 107 09/27/2021    CHLORIDE 109 10/04/2019    CO2 29 09/27/2021    CO2 32 10/04/2019    ANIONGAP 3 09/27/2021    ANIONGAP 1 (L) 10/04/2019    GLC 98 09/27/2021     (H) 10/04/2019    BUN 14 09/27/2021    BUN 19 10/04/2019    CR 1.03 09/27/2021    CR 0.90 10/04/2019    GFRESTIMATED 51 (L) 09/27/2021    GFRESTIMATED 61 10/04/2019    GFRESTBLACK 70 10/04/2019    YUNIOR 9.0 09/27/2021    YUNIOR 9.4 10/04/2019        A1C RESULTS:  No results found for: A1C    INR " RESULTS:  Lab Results   Component Value Date    INR 0.96 08/07/2012           CC  No referring provider defined for this encounter.

## 2021-10-07 NOTE — LETTER
10/7/2021    Shawn Méndez MD  [address]  [place] [address]    RE: Tennille Lee       Dear Colleague,    I had the pleasure of seeing Tennille Lee in the [facility].    HPI and Plan:   See dictation    No orders of the defined types were placed in this encounter.    Orders Placed This Encounter   Medications     traZODone (DESYREL) 100 MG tablet     Sig: Take 100 mg by mouth At Bedtime     There are no discontinued medications.      No diagnosis found.    CURRENT MEDICATIONS:  Current Outpatient Medications   Medication Sig Dispense Refill     atorvastatin (LIPITOR) 10 MG tablet Correct sig: take 1 tab (10mg) 5 days/week, take 1/2 tab (5mg) 2 days/week 80 tablet 0     Cholecalciferol (VITAMIN D3 PO) Take 1,000 Units by mouth daily. 3x/week        Coenzyme Q10 (COQ-10 PO) Take 100 mg by mouth three times a week        cyanocobalamin (VITAMIN  B-12) 1000 MCG tablet Take 1,000 mcg by mouth daily       cyclobenzaprine (FLEXERIL) 5 MG tablet Take 1 tablet (5 mg) by mouth At Bedtime       ezetimibe (ZETIA) 10 MG tablet Take 1 tablet (10 mg) by mouth daily 90 tablet 3     gabapentin (NEURONTIN) 100 MG capsule Take 300 mg by mouth daily        LORazepam (ATIVAN) 1 MG tablet Take 1 tablet (1 mg) by mouth every 8 hours as needed for anxiety 30 tablet 0     MAGNESIUM OXIDE PO Take 250 mg by mouth. 3x/week        omeprazole (PRILOSEC) 20 MG DR capsule Take 1 capsule (20 mg) by mouth daily 90 capsule 2     traZODone (DESYREL) 100 MG tablet Take 100 mg by mouth At Bedtime       calcium carbonate-vitamin D (CALCIUM + D) 600-200 MG-UNIT TABS Take by mouth daily (Patient not taking: Reported on 10/7/2021) 60 tablet        ALLERGIES     Allergies   Allergen Reactions     Blue Dyes Other (See Comments)     Disperse Blue 106- Blue textile Dye     Myrbetriq [Mirabegron] Rash     Amoxicillin      Balsam [place] [Amberderm]      Clindamycin Hcl      Eugenol      Flu  Virus Vaccine      Neomycin Sulfate        PAST MEDICAL HISTORY:  Past Medical History:   Diagnosis Date     Abdominal pain 06/2016    ct abd and pelvis with liver hemangiomas and renal cysts     ASCVD (arteriosclerotic cardiovascular disease) 8/12    angio done and med mgmt, done after abnl ct angio     Aragon's cyst     left     Degenerative disk disease     bulge cervicle disk     DJD (degenerative joint disease)      GERD (gastroesophageal reflux disease) 2002    egd done     Gilbert's disease     borderline elevated BR     Hemangioma of liver 9/12    ct angio with ?liver lesion, then ct of liver showed it be hemangioma, also renal cysts     Hx of colonoscopy 2010    [facility] [place]     Hypercholesteremia      Hypertension      IFG (impaired fasting glucose)      Lumbar spinal stenosis 2018    done in [place], had mri     Obese     prior phen/fen   no valve dz     JOSE (obstructive sleep apnea) 2008    cpap     Other symptoms involving cardiovascular system     bruit     Rosacea      Thyroid nodules 2011    Dr. Guzmán--benign     TMJ (dislocation of temporomandibular joint)     left     Urine incontinence      Vertigo        PAST SURGICAL HISTORY:  Past Surgical History:   Procedure Laterality Date     carpel tunnel surgery  2011     CORONARY ANGIOGRAPHY ADULT ORDER  8/7/12    moderate CAD     lid lag surgery  2004     right knee arthroscopy  1999     TONSILLECTOMY  child       FAMILY HISTORY:  Family History   Problem Relation Age of Onset     Cancer - colorectal Father      Heart Disease Father 40        MI     Myocardial Infarction Father      Heart Defect Mother      Heart Surgery Mother         CABG     Heart Surgery Brother         CABG     Heart Surgery Daughter 40        stent placed       SOCIAL HISTORY:  Social History     Socioeconomic History     Marital status:      Spouse name: None     Number of children: 3     Years of education: None     Highest education level: None   Occupational  History     Occupation: homemaker   Tobacco Use     Smoking status: Former Smoker     Packs/day: 1.00     Types: Cigarettes     Start date:      Quit date: 1960     Years since quittin.3     Smokeless tobacco: Never Used   Substance and Sexual Activity     Alcohol use: No     Alcohol/week: 0.0 standard drinks     Drug use: No     Sexual activity: Not Currently   Other Topics Concern     Parent/sibling w/ CABG, MI or angioplasty before 65F 55M? Yes      Service Not Asked     Blood Transfusions Not Asked     Caffeine Concern No     Comment: decaf:  0-2 cups a day     Occupational Exposure Not Asked     Hobby Hazards Not Asked     Sleep Concern Yes     Comment: soemtimes takes lorazepam     Stress Concern Yes     Weight Concern Yes     Special Diet No     Back Care Not Asked     Exercise Yes     Comment: water aerobics x4 days a week     Bike Helmet Not Asked     Seat Belt Yes     Self-Exams Not Asked   Social History Narrative     None     Social Determinants of Health     Financial Resource Strain:      Difficulty of Paying Living Expenses:    Food Insecurity:      Worried About Running Out of Food in the Last Year:      Ran Out of Food in the Last Year:    Transportation Needs:      Lack of Transportation (Medical):      Lack of Transportation (Non-Medical):    Physical Activity:      Days of Exercise per Week:      Minutes of Exercise per Session:    Stress:      Feeling of Stress :    Social Connections:      Frequency of Communication with Friends and Family:      Frequency of Social Gatherings with Friends and Family:      Attends Caodaism Services:      Active Member of Clubs or Organizations:      Attends Club or Organization Meetings:      Marital Status:    Intimate Partner Violence:      Fear of Current or Ex-Partner:      Emotionally Abused:      Physically Abused:      Sexually Abused:        Review of Systems:  Skin:  Negative     Eyes:  Positive for glasses  ENT:  Negative   "  Respiratory:  Positive for dyspnea on exertion  Cardiovascular:  Negative for;lightheadedness;dizziness;palpitations;chest pain  (mid sternal x 1)  Gastroenterology: Positive for reflux  Genitourinary:  Positive for incontinence  Musculoskeletal:  Positive for back pain  Neurologic:  Positive for numbness or tingling of feet  Psychiatric:  Positive for sleep disturbances  Heme/Lymph/Imm:  Positive for allergies  Endocrine:  Negative      Physical Exam:  Vitals: /83   Pulse 79   Ht 1.613 m (5' 3.5\")   Wt 90.3 kg (199 lb)   BMI 34.69 kg/m      Constitutional:           Skin:             Head:           Eyes:           Lymph:      ENT:           Neck:           Respiratory:            Cardiac:                                                           GI:           Extremities and Muscular Skeletal:                 Neurological:           Psych:         Recent Lab Results:  LIPID RESULTS:  Lab Results   Component Value Date    CHOL 152 09/27/2021    CHOL 165 10/04/2019    HDL 77 09/27/2021    HDL 71 10/04/2019    LDL 54 09/27/2021    LDL 69 10/04/2019    TRIG 106 09/27/2021    TRIG 127 10/04/2019    CHOLHDLRATIO 2.7 08/25/2015       LIVER ENZYME RESULTS:  Lab Results   Component Value Date    AST 11 06/24/2016    ALT 23 09/27/2021    ALT 23 10/04/2019       CBC RESULTS:  Lab Results   Component Value Date    WBC 5.5 09/14/2018    RBC 4.68 09/14/2018    HGB 13.2 09/14/2018    HCT 40.2 09/14/2018    MCV 86 09/14/2018    MCH 28.2 09/14/2018    MCHC 32.8 09/14/2018    RDW 13.0 09/14/2018     09/14/2018       BMP RESULTS:  Lab Results   Component Value Date     09/27/2021     10/04/2019    POTASSIUM 4.0 09/27/2021    POTASSIUM 4.0 10/04/2019    CHLORIDE 107 09/27/2021    CHLORIDE 109 10/04/2019    CO2 29 09/27/2021    CO2 32 10/04/2019    ANIONGAP 3 09/27/2021    ANIONGAP 1 (L) 10/04/2019    GLC 98 09/27/2021     (H) 10/04/2019    BUN 14 09/27/2021    BUN 19 10/04/2019    CR 1.03 " 09/27/2021    CR 0.90 10/04/2019    GFRESTIMATED 51 (L) 09/27/2021    GFRESTIMATED 61 10/04/2019    GFRESTBLACK 70 10/04/2019    YUNIOR 9.0 09/27/2021    YUNIOR 9.4 10/04/2019        A1C RESULTS:  No results found for: A1C    INR RESULTS:  Lab Results   Component Value Date    INR 0.96 08/07/2012           CC  No referring provider defined for this encounter.      Thank you for allowing me to participate in the care of your patient.      Sincerely,     Agus Gill MD     [facility]  cc:   No referring provider defined for this encounter.

## 2021-10-08 ENCOUNTER — HOSPITAL ENCOUNTER (OUTPATIENT)
Dept: CARDIOLOGY | Facility: CLINIC | Age: 81
Discharge: HOME | End: 2021-10-08
Attending: INTERNAL MEDICINE | Admitting: INTERNAL MEDICINE
Payer: MEDICARE

## 2021-10-08 DIAGNOSIS — R06.09 DYSPNEA ON EXERTION: ICD-10-CM

## 2021-10-08 DIAGNOSIS — I25.810 CORONARY ARTERY DISEASE INVOLVING AUTOLOGOUS ARTERY CORONARY BYPASS GRAFT, UNSPECIFIED WHETHER ANGINA PRESENT: ICD-10-CM

## 2021-10-08 LAB — LVEF ECHO: NORMAL

## 2021-10-08 PROCEDURE — 255N000002 HC RX 255 OP 636: Performed by: INTERNAL MEDICINE

## 2021-10-08 PROCEDURE — 999N000208 ECHOCARDIOGRAM COMPLETE

## 2021-10-08 PROCEDURE — C8929 TTE W OR WO FOL WCON,DOPPLER: HCPCS

## 2021-10-08 PROCEDURE — 93306 TTE W/DOPPLER COMPLETE: CPT | Mod: 26 | Performed by: INTERNAL MEDICINE

## 2021-10-08 RX ADMIN — HUMAN ALBUMIN MICROSPHERES AND PERFLUTREN 3 ML: 10; .22 INJECTION, SOLUTION INTRAVENOUS at 14:24

## 2021-10-12 NOTE — PROGRESS NOTES
Service Date: 10/07/2021    CARDIOLOGY CONSULTATION    HISTORY OF PRESENT ILLNESS:  Tennille Lee returns for followup.  She is a cousin of mine.  She has hypertension, hyperlipidemia.  She also has mild coronary disease.  She had an angiogram in 2012 that showed LAD narrowing up to 20%, diagonal up to 40% in a smaller vessel.  The circumflex had 40%-50% narrowing.  The right coronary artery had 20% narrowing.  At that time, her ejection fraction was normal.  Her LVEDP was normal.  Her last echo was actually in 2002.  Last year and the year before, Tennille was mentioning some shortness of breath but it seems to have progressed.  She states, simply walking from the parking ramp over to the office, she sometimes has to stop.  There is no cough or sputum.  She reports some type of chest CT, probably in [place], with some abnormality but we do not know what it was but she does not recall being told it was anything too serious.  She has no chest pain.  No dizziness, syncope, palpitations, edema.  At this time, I reviewed with her lab work.  Her BMP and her lipid panel are completely normal.  Blood sugar is normal.  BNP is slightly high at 34.7.  I am going to recommend we do an echocardiogram to look for any valvular dysfunction or LV or RV dysfunction.  She does have obstructive sleep apnea with a CPAP, so we will also look for pulmonary hypertension.  We will also go ahead and do a nuclear stress test just to make sure that the shortness of breath with exertion is not an anginal equivalent.  If those 2 tests are normal, it is much less likely this is cardiac in origin.   It may be respiratory deconditioning, etc.  We will try to get this done before she goes to [place].    Today's visit was 29 minutes, greater than 50% counseling.    Agus Gill MD    cc:  Shawn Méndez MD  [facility]  [address]  [place] [address]    Agus Gill MD        D: 10/07/2021   T: 10/07/2021   MT:  [email or web address]    Name:     KAT MCKOY  MRN:      [id number]        Account:      [id number]   :      1940           Service Date: 10/07/2021       Document: [id number]

## 2021-10-19 ENCOUNTER — MEDICAL CORRESPONDENCE (OUTPATIENT)
Dept: HEALTH INFORMATION MANAGEMENT | Facility: CLINIC | Age: 81
End: 2021-10-19
Payer: MEDICARE

## 2021-10-21 ENCOUNTER — HOSPITAL ENCOUNTER (OUTPATIENT)
Dept: CARDIOLOGY | Facility: CLINIC | Age: 81
End: 2021-10-21
Attending: INTERNAL MEDICINE
Payer: MEDICARE

## 2021-10-21 VITALS
WEIGHT: 202.8 LBS | HEIGHT: 63 IN | DIASTOLIC BLOOD PRESSURE: 74 MMHG | SYSTOLIC BLOOD PRESSURE: 116 MMHG | BODY MASS INDEX: 35.93 KG/M2 | OXYGEN SATURATION: 95 % | HEART RATE: 79 BPM

## 2021-10-21 DIAGNOSIS — R06.09 DYSPNEA ON EXERTION: ICD-10-CM

## 2021-10-21 DIAGNOSIS — I25.810 CORONARY ARTERY DISEASE INVOLVING AUTOLOGOUS ARTERY CORONARY BYPASS GRAFT, UNSPECIFIED WHETHER ANGINA PRESENT: ICD-10-CM

## 2021-10-21 LAB
CV STRESS MAX HR HE: 155 BPM
NUC STRESS EJECTION FRACTION: 72 %
RATE PRESSURE PRODUCT: NORMAL
STRESS ANGINA INDEX: 0
STRESS ECHO BASELINE DIASTOLIC HE: 88
STRESS ECHO BASELINE HR: 86 BPM
STRESS ECHO BASELINE SYSTOLIC BP: 156
STRESS ECHO CALCULATED PERCENT HR: 111 %
STRESS ECHO LAST STRESS DIASTOLIC BP: 78
STRESS ECHO LAST STRESS SYSTOLIC BP: 168
STRESS ECHO POST ESTIMATED WORKLOAD: 3.4 METS
STRESS ECHO POST EXERCISE DUR MIN: 6 MIN
STRESS ECHO POST EXERCISE DUR SEC: 0 SEC
STRESS ECHO TARGET HR: 140

## 2021-10-21 PROCEDURE — 93016 CV STRESS TEST SUPVJ ONLY: CPT | Performed by: INTERNAL MEDICINE

## 2021-10-21 PROCEDURE — 93018 CV STRESS TEST I&R ONLY: CPT | Mod: MG | Performed by: INTERNAL MEDICINE

## 2021-10-21 PROCEDURE — A9502 TC99M TETROFOSMIN: HCPCS | Performed by: INTERNAL MEDICINE

## 2021-10-21 PROCEDURE — G1004 CDSM NDSC: HCPCS | Performed by: INTERNAL MEDICINE

## 2021-10-21 PROCEDURE — 343N000001 HC RX 343: Performed by: INTERNAL MEDICINE

## 2021-10-21 PROCEDURE — G1004 CDSM NDSC: HCPCS

## 2021-10-21 PROCEDURE — 78452 HT MUSCLE IMAGE SPECT MULT: CPT | Mod: 26 | Performed by: INTERNAL MEDICINE

## 2021-10-21 RX ORDER — AMINOPHYLLINE 25 MG/ML
50-100 INJECTION, SOLUTION INTRAVENOUS
Status: DISCONTINUED | OUTPATIENT
Start: 2021-10-21 | End: 2021-10-22 | Stop reason: HOSPADM

## 2021-10-21 RX ORDER — ACYCLOVIR 200 MG/1
0-1 CAPSULE ORAL
Status: DISCONTINUED | OUTPATIENT
Start: 2021-10-21 | End: 2021-10-22 | Stop reason: HOSPADM

## 2021-10-21 RX ORDER — ALBUTEROL SULFATE 90 UG/1
2 AEROSOL, METERED RESPIRATORY (INHALATION) EVERY 5 MIN PRN
Status: DISCONTINUED | OUTPATIENT
Start: 2021-10-21 | End: 2021-10-22 | Stop reason: HOSPADM

## 2021-10-21 RX ORDER — CAFFEINE CITRATE 20 MG/ML
60 SOLUTION INTRAVENOUS
Status: DISCONTINUED | OUTPATIENT
Start: 2021-10-21 | End: 2021-10-22 | Stop reason: HOSPADM

## 2021-10-21 RX ORDER — REGADENOSON 0.08 MG/ML
0.4 INJECTION, SOLUTION INTRAVENOUS ONCE
Status: DISCONTINUED | OUTPATIENT
Start: 2021-10-21 | End: 2021-10-22 | Stop reason: HOSPADM

## 2021-10-21 RX ADMIN — TETROFOSMIN 3.76 MCI.: 1.38 INJECTION, POWDER, LYOPHILIZED, FOR SOLUTION INTRAVENOUS at 10:07

## 2021-10-21 RX ADMIN — TETROFOSMIN 11.47 MCI.: 1.38 INJECTION, POWDER, LYOPHILIZED, FOR SOLUTION INTRAVENOUS at 12:10

## 2021-10-21 ASSESSMENT — MIFFLIN-ST. JEOR: SCORE: 1359.02

## 2021-10-22 ENCOUNTER — OFFICE VISIT (OUTPATIENT)
Dept: CARDIOLOGY | Facility: CLINIC | Age: 81
End: 2021-10-22
Attending: INTERNAL MEDICINE
Payer: MEDICARE

## 2021-10-22 VITALS
HEIGHT: 64 IN | DIASTOLIC BLOOD PRESSURE: 85 MMHG | SYSTOLIC BLOOD PRESSURE: 127 MMHG | HEART RATE: 84 BPM | WEIGHT: 200 LBS | BODY MASS INDEX: 34.15 KG/M2

## 2021-10-22 DIAGNOSIS — G47.33 OSA (OBSTRUCTIVE SLEEP APNEA): ICD-10-CM

## 2021-10-22 DIAGNOSIS — I10 ESSENTIAL HYPERTENSION: ICD-10-CM

## 2021-10-22 DIAGNOSIS — I25.810 CORONARY ARTERY DISEASE INVOLVING AUTOLOGOUS ARTERY CORONARY BYPASS GRAFT, UNSPECIFIED WHETHER ANGINA PRESENT: ICD-10-CM

## 2021-10-22 DIAGNOSIS — E78.5 HYPERLIPIDEMIA LDL GOAL <70: ICD-10-CM

## 2021-10-22 DIAGNOSIS — I25.10 CORONARY ARTERY DISEASE INVOLVING NATIVE CORONARY ARTERY OF NATIVE HEART WITHOUT ANGINA PECTORIS: ICD-10-CM

## 2021-10-22 DIAGNOSIS — R06.09 DYSPNEA ON EXERTION: Primary | ICD-10-CM

## 2021-10-22 PROCEDURE — 99214 OFFICE O/P EST MOD 30 MIN: CPT | Performed by: NURSE PRACTITIONER

## 2021-10-22 ASSESSMENT — MIFFLIN-ST. JEOR: SCORE: 1354.25

## 2021-10-22 NOTE — PATIENT INSTRUCTIONS
Thank you for your visit with the [facility] today.    Today's plan:   1. Continue with your current medications for now.  Continue to check your weights regularly and try to stick to a low sodium diet (under 2,000 mg/day).  Call the clinic if you have signs concerning for fluid retention, including weight gain of over 3 pounds in 1 night or over 5 pounds in 1 week, worsening shortness of breath, or worsening swelling in the legs or abdomen.   Follow up with Dr. Gill for a routine annual visit around this time next year.    If you have questions or concerns, please do not hesitate to call my nurse, Saloni, at [phone].     Scheduling phone number: [phone]    It was a pleasure seeing you today!     VITO Farooq, CNP  Nurse Practitioner  [facility]  October 22, 2021  ________________________________________________________

## 2021-10-22 NOTE — PROGRESS NOTES
"  Cardiology Clinic Progress Note    Service Date: October 22, 2021    Primary Cardiologist: Dr. Gill      Reason for Visit: Follow-up of stress test and echocardiogram results    HPI:   I had the pleasure of meeting Ms. Tennille Lee in the clinic today. She is a very pleasant 80 year old female with a past medical history notable for hypertension, hyperlipidemia, JOSE on CPAP, and mild coronary artery disease. She had a coronary angiogram in 2012 that showed LAD narrowing up to 20%, diagonal up to 40% stenosis in a smaller vessel. The circumflex had 40-50% narrowing. The right coronary artery had 20% narrowing. At that time, her ejection fraction was normal.     The patient has followed with Dr. Gill for her cardiology care and is apparently actually a cousin of Dr. Gill. She spends her thomson down in [place] and is planning to leave within the next month or two.  She was seen by Dr. Gill earlier this month and reported some progressive exertional dyspnea over the past couple of years to the point where simply walking from the parking ramp over to the office, she sometimes has to stop to rest and catch her breath.      She otherwise has not had chest pain, dizziness, syncope, palpitations, or edema.  A recent BMP and her lipid panel were unremarkable. An echocardiogram to look for any valvular dysfunction or LV or RV dysfunction. Nuclear stress test was also recommended to rule out the possibility of ischemia.    The echocardiogram was completed on 10/08/2021 showing normal LV and RV structure, function, and size with LVEF 60-65%. Grade I or early diastolic dysfunction was noted. The IVC was normal in size with preserved respiratory variability suggesting against significant volume overload.    The nuclear stress test was just completed yesterday on 10/21/2021 and was read as \"probably negative\" for inducible myocardial ischemia or infarction with normal LV function again noted.     Today, Ms. Lee " presents to the clinic accompanied by her , Monty, in follow up of recent test results. They spend their [place]om[place] in the [place], [place] area and are planning to take off early next week.  Tennille tells me that she has been feeling generally well since she checked in with Dr. Gill earlier this fall.  She continues to have occasional mild exertional dyspnea but actually feels like her symptoms have been getting a little bit better of late and have not been significantly limiting for her. She notes that she has been making more of an effort to try to watch and limit her sodium intake.  Beyond her mild shortness of breath, she otherwise denies symptoms of chest pain, palpitations, dizziness, presyncope, syncope, significant lower extremity edema, orthopnea, or PND.  She wears CPAP for management of her sleep apnea and has been using this faithfully every night.    ASSESSMENT AND PLAN:  1. Dyspnea on exertion  - We reviewed the results of her echocardiogram and stress test in detail today. I suspect her symptoms are likely multifactorial in part due to obesity and physical deconditioning possibly with a component of mild diastolic dysfunction. She does not have signs or symptoms to suggest significant volume overload or acute CHF at this time.  - Recommend she continue to try to stay physically active and continue with good management of her blood pressure, limiting sodium in her diet, and with treatment with CPAP for her sleep apnea.  - Reviewed to call if her symptoms worsen or she has other signs of fluid retention and we could consider a trial of a low-dose diuretic.    2. Coronary artery disease   - Stable with no clear anginal symptoms at this time and with the nuclear stress test yesterday showing no evidence of inducible ischemia or infarction with normal LV function.    3. Essential hypertension, well controlled    4. Hyperlipidemia LDL goal <70  -Treated on low-dose atorvastatin with most recent LDL  of 54 in 09/2021.    5. Obesity, BMI 34    6. JOSE (obstructive sleep apnea), treated with CPAP    Thank you for the opportunity to participate in this pleasant patient's care. I will plan to have her see Dr. Gill for a routine annual follow-up visit around this time next year.  I encouraged the patient to call the clinic with questions or concerns in the meantime, particularly should she notice increasing exertional dyspnea, lower extremity edema, or weight gain concerning for fluid retention, and we would be happy to see her sooner if needed for any concerns in the meantime.    33 total minutes was spent today including chart review, precharting, history and exam, post visit documentation, and reviewing studies as outlined above.     VITO Farooq, CNP   Nurse Practitioner  [facility]  Pager: [phone]  Text Page  (8am - 5pm, M-F)    Orders this Visit:  No orders of the defined types were placed in this encounter.    No orders of the defined types were placed in this encounter.    There are no discontinued medications.  Encounter Diagnoses   Name Primary?     Dyspnea on exertion Yes     Coronary artery disease involving native coronary artery of native heart without angina pectoris      Essential hypertension      Hyperlipidemia LDL goal <70      JOSE (obstructive sleep apnea)      Coronary artery disease involving autologous artery coronary bypass graft, unspecified whether angina present      CURRENT MEDICATIONS:  Current Outpatient Medications   Medication Sig Dispense Refill     atorvastatin (LIPITOR) 10 MG tablet Correct sig: take 1 tab (10mg) 5 days/week, take 1/2 tab (5mg) 2 days/week 80 tablet 0     Cholecalciferol (VITAMIN D3 PO) Take 1,000 Units by mouth daily. 3x/week        Coenzyme Q10 (COQ-10 PO) Take 100 mg by mouth three times a week        cyanocobalamin (VITAMIN  B-12) 1000 MCG tablet Take 1,000 mcg by mouth daily       cyclobenzaprine (FLEXERIL) 5 MG tablet Take 1  tablet (5 mg) by mouth At Bedtime       ezetimibe (ZETIA) 10 MG tablet Take 1 tablet (10 mg) by mouth daily 90 tablet 3     gabapentin (NEURONTIN) 100 MG capsule Take 300 mg by mouth daily        LORazepam (ATIVAN) 1 MG tablet Take 1 tablet (1 mg) by mouth every 8 hours as needed for anxiety 30 tablet 0     MAGNESIUM OXIDE PO Take 250 mg by mouth. 3x/week        omeprazole (PRILOSEC) 20 MG DR capsule Take 1 capsule (20 mg) by mouth daily 90 capsule 2     traZODone (DESYREL) 100 MG tablet Take 100 mg by mouth At Bedtime       calcium carbonate-vitamin D (CALCIUM + D) 600-200 MG-UNIT TABS Take by mouth daily (Patient not taking: Reported on 10/7/2021) 60 tablet      ALLERGIES  Allergies   Allergen Reactions     Blue Dyes Other (See Comments)     Disperse Blue 106- Blue textile Dye     Myrbetriq [Mirabegron] Rash     Amoxicillin      Balsam [place] [Amberderm]      Clindamycin Hcl      Eugenol      Flu Virus Vaccine      Neomycin Sulfate      PAST MEDICAL, SURGICAL, FAMILY HISTORY:  History was reviewed and updated as needed, see medical record.    SOCIAL HISTORY:  Social History     Socioeconomic History     Marital status:      Spouse name: Not on file     Number of children: 3     Years of education: Not on file     Highest education level: Not on file   Occupational History     Occupation: homemaker   Tobacco Use     Smoking status: Former Smoker     Packs/day: 1.00     Types: Cigarettes     Start date:      Quit date: 1960     Years since quittin.4     Smokeless tobacco: Never Used   Substance and Sexual Activity     Alcohol use: No     Alcohol/week: 0.0 standard drinks     Drug use: No     Sexual activity: Not Currently   Other Topics Concern     Parent/sibling w/ CABG, MI or angioplasty before 65F 55M? Yes      Service Not Asked     Blood Transfusions Not Asked     Caffeine Concern No     Comment: decaf:  0-2 cups a day     Occupational Exposure Not Asked     Hobby Hazards Not  "Asked     Sleep Concern Yes     Comment: soemtimes takes lorazepam     Stress Concern Yes     Weight Concern Yes     Special Diet No     Back Care Not Asked     Exercise Yes     Comment: water aerobics x4 days a week     Bike Helmet Not Asked     Seat Belt Yes     Self-Exams Not Asked   Social History Narrative     Not on file     Social Determinants of Health     Financial Resource Strain:      Difficulty of Paying Living Expenses:    Food Insecurity:      Worried About Running Out of Food in the Last Year:      Ran Out of Food in the Last Year:    Transportation Needs:      Lack of Transportation (Medical):      Lack of Transportation (Non-Medical):    Physical Activity:      Days of Exercise per Week:      Minutes of Exercise per Session:    Stress:      Feeling of Stress :    Social Connections:      Frequency of Communication with Friends and Family:      Frequency of Social Gatherings with Friends and Family:      Attends Oriental orthodox Services:      Active Member of Clubs or Organizations:      Attends Club or Organization Meetings:      Marital Status:    Intimate Partner Violence:      Fear of Current or Ex-Partner:      Emotionally Abused:      Physically Abused:      Sexually Abused:      Review of Systems:  Skin:  Positive for bruising   Eyes:  Positive for glasses  ENT:  Negative    Respiratory:  Positive for dyspnea on exertion  Cardiovascular:  chest pain;Negative;palpitations;edema;lightheadedness;dizziness    Gastroenterology: Negative    Genitourinary:  Positive for urinary frequency;incontinence  Musculoskeletal:  Positive for back pain  Neurologic:  Negative headaches  Psychiatric:  Negative    Heme/Lymph/Imm:  Positive for allergies  Endocrine:  Negative thyroid disorder;diabetes     Physical Exam:  Vitals: /85   Pulse 84   Ht 1.613 m (5' 3.5\")   Wt 90.7 kg (200 lb)   BMI 34.87 kg/m     Wt Readings from Last 4 Encounters:   10/22/21 90.7 kg (200 lb)   10/21/21 92 kg (202 lb 12.8 oz) "   10/07/21 90.3 kg (199 lb)   07/09/21 91.2 kg (201 lb)     CONSTITUTIONAL: Appears her stated age, well nourished, and in no acute distress.  HEENT: Pupils equal, round. Sclerae nonicteric.    NECK: Supple, challenging to assess JVP due to body habitus but obvious JVD appreciated.  C/V: Regular rate and rhythm, normal S1 and S2, no S3 or S4, no murmur, rub or gallop.   RESP: Respirations are unlabored. Lungs are clear to auscultation bilaterally without wheezing, rales, or rhonchi.  EXTREM: No clubbing, cyanosis, or lower extremity edema bilaterally.   NEURO: Alert and oriented, cooperative. Gait steady. No gross focal deficits.   PSYCH: Affect appropriate. Mentation normal. Responds to questions appropriately.  SKIN: Warm and dry. No apparent rashes or bruising.    Recent Lab Results reviewed today:  LIPID RESULTS:  Lab Results   Component Value Date    CHOL 152 09/27/2021    CHOL 165 10/04/2019    HDL 77 09/27/2021    HDL 71 10/04/2019    LDL 54 09/27/2021    LDL 69 10/04/2019    TRIG 106 09/27/2021    TRIG 127 10/04/2019    CHOLHDLRATIO 2.7 08/25/2015     LIVER ENZYME RESULTS:  Lab Results   Component Value Date    AST 11 06/24/2016    ALT 23 09/27/2021    ALT 23 10/04/2019     CBC RESULTS:  Lab Results   Component Value Date    WBC 5.5 09/14/2018    RBC 4.68 09/14/2018    HGB 13.2 09/14/2018    HCT 40.2 09/14/2018    MCV 86 09/14/2018    MCH 28.2 09/14/2018    MCHC 32.8 09/14/2018    RDW 13.0 09/14/2018     09/14/2018     BMP RESULTS:  Lab Results   Component Value Date     09/27/2021     10/04/2019    POTASSIUM 4.0 09/27/2021    POTASSIUM 4.0 10/04/2019    CHLORIDE 107 09/27/2021    CHLORIDE 109 10/04/2019    CO2 29 09/27/2021    CO2 32 10/04/2019    ANIONGAP 3 09/27/2021    ANIONGAP 1 (L) 10/04/2019    GLC 98 09/27/2021     (H) 10/04/2019    BUN 14 09/27/2021    BUN 19 10/04/2019    CR 1.03 09/27/2021    CR 0.90 10/04/2019    GFRESTIMATED 51 (L) 09/27/2021    GFRESTIMATED 61  10/04/2019    GFRESTBLACK 70 10/04/2019    YUNIOR 9.0 09/27/2021    YUNIOR 9.4 10/04/2019      CC  Agus Gill MD  [id number]  [place] [id number]    This note was completed in part using Dragon voice recognition software. Although reviewed after completion, some word and grammatical errors may occur.

## 2022-02-20 ENCOUNTER — HEALTH MAINTENANCE LETTER (OUTPATIENT)
Age: 82
End: 2022-02-20

## 2022-03-10 ENCOUNTER — TRANSFERRED RECORDS (OUTPATIENT)
Dept: HEALTH INFORMATION MANAGEMENT | Facility: CLINIC | Age: 82
End: 2022-03-10

## 2022-04-28 ENCOUNTER — TRANSFERRED RECORDS (OUTPATIENT)
Dept: HEALTH INFORMATION MANAGEMENT | Facility: CLINIC | Age: 82
End: 2022-04-28
Payer: MEDICARE

## 2022-06-24 ENCOUNTER — TRANSFERRED RECORDS (OUTPATIENT)
Dept: FAMILY MEDICINE | Facility: CLINIC | Age: 82
End: 2022-06-24

## 2022-06-27 ENCOUNTER — TRANSFERRED RECORDS (OUTPATIENT)
Dept: HEALTH INFORMATION MANAGEMENT | Facility: CLINIC | Age: 82
End: 2022-06-27

## 2022-07-11 ENCOUNTER — OFFICE VISIT (OUTPATIENT)
Dept: FAMILY MEDICINE | Facility: CLINIC | Age: 82
End: 2022-07-11
Payer: MEDICARE

## 2022-07-11 VITALS
HEIGHT: 64 IN | TEMPERATURE: 97.8 F | OXYGEN SATURATION: 95 % | BODY MASS INDEX: 34.83 KG/M2 | HEART RATE: 84 BPM | DIASTOLIC BLOOD PRESSURE: 82 MMHG | WEIGHT: 204 LBS | SYSTOLIC BLOOD PRESSURE: 119 MMHG

## 2022-07-11 DIAGNOSIS — H61.21 IMPACTED CERUMEN OF RIGHT EAR: ICD-10-CM

## 2022-07-11 DIAGNOSIS — E66.01 MORBID OBESITY (H): ICD-10-CM

## 2022-07-11 DIAGNOSIS — E78.5 HYPERLIPIDEMIA LDL GOAL <130: ICD-10-CM

## 2022-07-11 DIAGNOSIS — N18.31 CHRONIC KIDNEY DISEASE, STAGE 3A (H): ICD-10-CM

## 2022-07-11 DIAGNOSIS — Z01.818 PRE-OPERATIVE GENERAL PHYSICAL EXAMINATION: Primary | ICD-10-CM

## 2022-07-11 DIAGNOSIS — M79.10 MYALGIA: ICD-10-CM

## 2022-07-11 DIAGNOSIS — R06.09 DOE (DYSPNEA ON EXERTION): ICD-10-CM

## 2022-07-11 DIAGNOSIS — H26.9 CATARACT OF BOTH EYES, UNSPECIFIED CATARACT TYPE: ICD-10-CM

## 2022-07-11 DIAGNOSIS — I10 PRIMARY HYPERTENSION: ICD-10-CM

## 2022-07-11 PROCEDURE — 99215 OFFICE O/P EST HI 40 MIN: CPT | Performed by: INTERNAL MEDICINE

## 2022-07-11 ASSESSMENT — PAIN SCALES - GENERAL: PAINLEVEL: MILD PAIN (2)

## 2022-07-11 NOTE — PROGRESS NOTES
[address], [address]  [facility] [address]  Phone: [phone]  Primary Provider: Shawn Méndez  Pre-op Performing Provider: BLANCA INFANTE      PREOPERATIVE EVALUATION:  Today's date: 7/11/2022    Tennille Lee is a 81 year old female who presents for a preoperative evaluation.    Surgical Information:  Surgery/Procedure: cataract removal both eyes  Surgery Location: [facility]   Surgeon: Vincenzo Huertas M.D.  Surgery Date: 7/29/22 Right Eye  Time of Surgery: TBD  Where patient plans to recover: At home with family  Fax number for surgical facility: [phone]    Type of Anesthesia Anticipated: to be determined    Assessment & Plan     The proposed surgical procedure is considered LOW risk.    Problem List Items Addressed This Visit        Digestive    Obesity (BMI 35.0-39.9) with comorbidity (H)       Endocrine    Hyperlipidemia LDL goal <130    Relevant Medications    ROSUVASTATIN CALCIUM PO    Other Relevant Orders    Lipid panel reflex to direct LDL Fasting       Circulatory    HTN (hypertension)       Urinary    Chronic kidney disease, stage 3a (H)    Relevant Orders    CBC with platelets    Comprehensive metabolic panel (BMP + Alb, Alk Phos, ALT, AST, Total. Bili, TP)      Other Visit Diagnoses     Pre-operative general physical examination    -  Primary    Relevant Orders    CBC with platelets    Cataract of both eyes, unspecified cataract type        Myalgia        Relevant Orders    CK total    SIMPSON (dyspnea on exertion)        Relevant Orders    CBC with platelets    Comprehensive metabolic panel (BMP + Alb, Alk Phos, ALT, AST, Total. Bili, TP)    Impacted cerumen of right ear        canuse otc debrox               Risks and Recommendations:  The patient has the following additional risks and recommendations for perioperative complications:   - No identified additional risk factors other than previously  addressed    Medication Instructions:   - pregabalin, gabapentin: Continue without modification.   - SSRIs, SNRIs, TCAs, Antipsychotics: Continue without modification.     RECOMMENDATION:  APPROVAL GIVEN to proceed with proposed procedure, without further diagnostic evaluation.    Advised to do labs (patient would like to repeat labs given to her by cardiology including lipid panel and CK ,CMP and CBC).  No need for EKG at this time.  Advised to follow-up with cardiology for her chronic dyspnea on exertion.  Patient will be cleared for surgery as is at low risk surgery.  All questions answered.  Total time spent was 40 minutes review of records exam and recommendations.      Subjective     HPI related to upcoming procedure: Patient presenting for preop clearance for cataract surgery.  She reports that surgery would be done at the end of the month.  She reports history of dyspnea on exertion.  She had cardiac evaluation in 2021 including nuclear stress test and echocardiogram that were normal.  She does have history of coronary artery disease; nonobstructive.  She does have dyspnea on exertion, was evaluated by cardiology was assumed probably related to an element of physical deconditioning and obesity in addition to some diastolic dysfunction.  Has some left foot pain  on the arc/medial aspect.  Occasional swelling in the legs.  She is not on antihypertensives or diuretics.  Her lung exam is clear pulse ox 95% on room air.  She denies any history of blood clots, she is using CPAP for sleep apnea.      Preop Questions 7/11/2022   1. Have you ever had a heart attack or stroke? No   2. Have you ever had surgery on your heart or blood vessels, such as a stent placement, a coronary artery bypass, or surgery on an artery in your head, neck, heart, or legs? No   3. Do you have chest pain with activity? No   4. Do you have a history of  heart failure? No   5. Do you currently have a cold, bronchitis or symptoms of other  infection? No   6. Do you have a cough, shortness of breath, or wheezing? UNKNOWN -    7. Do you or anyone in your family have previous history of blood clots? No   8. Do you or does anyone in your family have a serious bleeding problem such as prolonged bleeding following surgeries or cuts? No   9. Have you ever had problems with anemia or been told to take iron pills? No   10. Have you had any abnormal blood loss such as black, tarry or bloody stools, or abnormal vaginal bleeding? No   11. Have you ever had a blood transfusion? No   12. Are you willing to have a blood transfusion if it is medically needed before, during, or after your surgery? Yes   13. Have you or any of your relatives ever had problems with anesthesia? No   14. Do you have sleep apnea, excessive snoring or daytime drowsiness? YES -    14a. Do you have a CPAP machine? Yes   15. Do you have any artifical heart valves or other implanted medical devices like a pacemaker, defibrillator, or continuous glucose monitor? No   16. Do you have artificial joints? No   17. Are you allergic to latex? No     Health Care Directive:  Patient does not have a Health Care Directive or Living Will: Discussed advance care planning with patient; information given to patient to review.    Preoperative Review of :   reviewed - no record of controlled substances prescribed.      Status of Chronic Conditions:  HYPERLIPIDEMIA - Patient has a long history of significant Hyperlipidemia requiring medication for treatment with recent good control. Patient reports no problems or side effects with the medication.  On statins and ezetimibe well-tolerated.    SLEEP PROBLEM - Patient has a longstanding history of snoring..  Patient is on trazodone as well as uses a CPAP for obstructive sleep apnea    Coronary artery disease nonobstructive lesion  Has chronic dyspnea on exertion,      Review of Systems  Constitutional, neuro, ENT, endocrine, pulmonary, cardiac,  gastrointestinal, genitourinary, musculoskeletal, integument and psychiatric systems are negative, except as otherwise noted.    Patient Active Problem List    Diagnosis Date Noted     Chronic kidney disease, stage 3a (H) 07/11/2022     Priority: Medium     Obesity (BMI 35.0-39.9) with comorbidity (H) 07/22/2019     Priority: Medium     Lumbar spinal stenosis 01/01/2018     Priority: Medium     done in [place], had mri       Pelvic somatic dysfunction 08/12/2016     Priority: Medium     Urinary incontinence 08/12/2016     Priority: Medium     Hemangioma of liver      Priority: Medium     ct angio with ?liver lesio, then ct of liver showed it be hemangioma, also renal cysts       ASCVD (arteriosclerotic cardiovascular disease)      Priority: Medium     angio done and med mgmt.       Hyperlipidemia LDL goal <130 06/01/2012     Priority: Medium     GERD (gastroesophageal reflux disease)      Priority: Medium     egd done       JOSE (obstructive sleep apnea)      Priority: Medium     cpap       HTN (hypertension)      Priority: Medium     Dr. Huang       Thyroid nodules      Priority: Medium     Dr. Guzmán  Problem list name updated by automated process. Provider to review and confirm        Past Medical History:   Diagnosis Date     Abdominal pain 06/2016    ct abd and pelvis with liver hemangiomas and renal cysts     ASCVD (arteriosclerotic cardiovascular disease) 8/12    angio done and med mgmt, done after abnl ct angio     Aragon's cyst     left     Degenerative disk disease     bulge cervicle disk     DJD (degenerative joint disease)      GERD (gastroesophageal reflux disease) 2002    egd done     Gilbert's disease     borderline elevated BR     Hemangioma of liver 9/12    ct angio with ?liver lesion, then ct of liver showed it be hemangioma, also renal cysts     Hx of colonoscopy 2010    [facility] [place]     Hypercholesteremia      Hypertension      IFG (impaired fasting glucose)      Lumbar spinal stenosis 2018     done in [place], had mri     Obese     prior phen/fen   no valve dz     JOSE (obstructive sleep apnea) 2008    cpap     Other symptoms involving cardiovascular system     bruit     Rosacea      Thyroid nodules 2011    Dr. Guzmán--benign     TMJ (dislocation of temporomandibular joint)     left     Urine incontinence      Vertigo      Past Surgical History:   Procedure Laterality Date     carpel tunnel surgery  2011     CORONARY ANGIOGRAPHY ADULT ORDER  8/7/12    moderate CAD     lid lag surgery  2004     right knee arthroscopy  1999     TONSILLECTOMY  child     Current Outpatient Medications   Medication Sig Dispense Refill     Coenzyme Q10 (COQ-10 PO) Take 100 mg by mouth three times a week        cyanocobalamin (VITAMIN  B-12) 1000 MCG tablet Take 1,000 mcg by mouth daily       cyclobenzaprine (FLEXERIL) 5 MG tablet Take 1 tablet (5 mg) by mouth At Bedtime       ezetimibe (ZETIA) 10 MG tablet Take 1 tablet (10 mg) by mouth daily 90 tablet 3     gabapentin (NEURONTIN) 100 MG capsule Take 200 mg by mouth daily       LORazepam (ATIVAN) 1 MG tablet Take 1 tablet (1 mg) by mouth every 8 hours as needed for anxiety 30 tablet 0     MAGNESIUM OXIDE PO Take 250 mg by mouth. 3x/week        omeprazole (PRILOSEC) 20 MG DR capsule Take 1 capsule (20 mg) by mouth daily 90 capsule 2     ROSUVASTATIN CALCIUM PO ROSUVASTATIN CALCIUM TABS       traZODone (DESYREL) 100 MG tablet Take 100 mg by mouth At Bedtime       atorvastatin (LIPITOR) 10 MG tablet Correct sig: take 1 tab (10mg) 5 days/week, take 1/2 tab (5mg) 2 days/week (Patient not taking: Reported on 7/11/2022) 80 tablet 0     calcium carbonate-vitamin D 600-200 MG-UNIT TABS Take by mouth daily (Patient not taking: No sig reported) 60 tablet      Cholecalciferol (VITAMIN D3 PO) Take 1,000 Units by mouth daily. 3x/week  (Patient not taking: Reported on 7/11/2022)         Allergies   Allergen Reactions     Blue Dyes Other (See Comments)     Disperse Blue 106- Blue textile Dye      "Myrbetriq [Mirabegron] Rash     Amoxicillin      Balsam [place] [Amberderm]      Clindamycin Hcl      Eugenol      Flu Virus Vaccine      Neomycin Sulfate         Social History     Tobacco Use     Smoking status: Former Smoker     Packs/day: 1.00     Types: Cigarettes     Start date:      Quit date: 1960     Years since quittin.1     Smokeless tobacco: Never Used   Substance Use Topics     Alcohol use: No     Alcohol/week: 0.0 standard drinks     Family History   Problem Relation Age of Onset     Cancer - colorectal Father      Heart Disease Father 40        MI     Myocardial Infarction Father      Heart Defect Mother      Heart Surgery Mother         CABG     Heart Surgery Brother         CABG     Heart Surgery Daughter 40        stent placed     History   Drug Use No         Objective     /82 (BP Location: Right arm, Patient Position: Sitting, Cuff Size: Adult Large)   Pulse 84   Temp 97.8  F (36.6  C) (Temporal)   Ht 1.613 m (5' 3.5\")   Wt 92.5 kg (204 lb)   SpO2 95%   BMI 35.57 kg/m      Physical Exam    GENERAL APPEARANCE: healthy, alert and no distress     EYES: EOMI, PERRL     HENT: ear canals and TM's normal, has some hard wax in the right ear canal  and mouth without ulcers or lesions     NECK: no adenopathy, no asymmetry, masses, or scars and thyroid normal to palpation     RESP: lungs clear to auscultation - no rales, rhonchi or wheezes     CV: regular rates and rhythm, normal S1 S2, no S3 or S4 and no murmur, click or rub     ABDOMEN:  soft, nontender, no HSM or masses and bowel sounds normal     MS: extremities normal- no gross deformities noted, no evidence of inflammation in joints, FROM in all extremities.     SKIN: no suspicious lesions or rashes     NEURO: Normal strength and tone, sensory exam grossly normal, mentation intact and speech normal     PSYCH: mentation appears normal. and affect normal/bright     LYMPHATICS: No cervical adenopathy    Recent Labs   Lab Test " 09/27/21  0815      POTASSIUM 4.0   CR 1.03        Diagnostics:  Labs pending at this time.  Results will be reviewed when available.   No EKG required for low risk surgery (cataract, skin procedure, breast biopsy, etc).    Revised Cardiac Risk Index (RCRI):  The patient has the following serious cardiovascular risks for perioperative complications:   - No serious cardiac risks = 0 points     RCRI Interpretation: 0 points: Class I (very low risk - 0.4% complication rate)           Signed Electronically by: Teresita Wheeler MD  Copy of this evaluation report is provided to requesting physician.

## 2022-07-18 NOTE — PATIENT INSTRUCTIONS
It was great to speak with you.  I value your experience and would be very thankful for your time with providing feedback on our clinic survey. You may receive a survey via email or text message in the next few days.

## 2022-07-19 ENCOUNTER — LAB (OUTPATIENT)
Dept: LAB | Facility: CLINIC | Age: 82
End: 2022-07-19
Payer: MEDICARE

## 2022-07-19 DIAGNOSIS — E78.5 HYPERLIPIDEMIA LDL GOAL <130: ICD-10-CM

## 2022-07-19 DIAGNOSIS — M79.10 MYALGIA: ICD-10-CM

## 2022-07-19 DIAGNOSIS — N18.31 CHRONIC KIDNEY DISEASE, STAGE 3A (H): ICD-10-CM

## 2022-07-19 DIAGNOSIS — R06.09 DOE (DYSPNEA ON EXERTION): ICD-10-CM

## 2022-07-19 DIAGNOSIS — Z01.818 PRE-OPERATIVE GENERAL PHYSICAL EXAMINATION: ICD-10-CM

## 2022-07-19 LAB
ALBUMIN SERPL-MCNC: 3.5 G/DL (ref 3.4–5)
ALP SERPL-CCNC: 57 U/L (ref 40–150)
ALT SERPL W P-5'-P-CCNC: 20 U/L (ref 0–50)
ANION GAP SERPL CALCULATED.3IONS-SCNC: 8 MMOL/L (ref 3–14)
AST SERPL W P-5'-P-CCNC: 15 U/L (ref 0–45)
BILIRUB SERPL-MCNC: 1.6 MG/DL (ref 0.2–1.3)
BUN SERPL-MCNC: 17 MG/DL (ref 7–30)
CALCIUM SERPL-MCNC: 9.5 MG/DL (ref 8.5–10.1)
CHLORIDE BLD-SCNC: 108 MMOL/L (ref 94–109)
CHOLEST SERPL-MCNC: 138 MG/DL
CK SERPL-CCNC: 78 U/L (ref 30–225)
CO2 SERPL-SCNC: 25 MMOL/L (ref 20–32)
CREAT SERPL-MCNC: 0.92 MG/DL (ref 0.52–1.04)
ERYTHROCYTE [DISTWIDTH] IN BLOOD BY AUTOMATED COUNT: 12.1 % (ref 10–15)
FASTING STATUS PATIENT QL REPORTED: YES
GFR SERPL CREATININE-BSD FRML MDRD: 62 ML/MIN/1.73M2
GLUCOSE BLD-MCNC: 107 MG/DL (ref 70–99)
HCT VFR BLD AUTO: 42 % (ref 35–47)
HDLC SERPL-MCNC: 72 MG/DL
HGB BLD-MCNC: 13.7 G/DL (ref 11.7–15.7)
LDLC SERPL CALC-MCNC: 47 MG/DL
MCH RBC QN AUTO: 27.3 PG (ref 26.5–33)
MCHC RBC AUTO-ENTMCNC: 32.6 G/DL (ref 31.5–36.5)
MCV RBC AUTO: 84 FL (ref 78–100)
NONHDLC SERPL-MCNC: 66 MG/DL
PLATELET # BLD AUTO: 180 10E3/UL (ref 150–450)
POTASSIUM BLD-SCNC: 3.9 MMOL/L (ref 3.4–5.3)
PROT SERPL-MCNC: 6.8 G/DL (ref 6.8–8.8)
RBC # BLD AUTO: 5.01 10E6/UL (ref 3.8–5.2)
SODIUM SERPL-SCNC: 141 MMOL/L (ref 133–144)
TRIGL SERPL-MCNC: 96 MG/DL
WBC # BLD AUTO: 5 10E3/UL (ref 4–11)

## 2022-07-19 PROCEDURE — 80061 LIPID PANEL: CPT

## 2022-07-19 PROCEDURE — 82550 ASSAY OF CK (CPK): CPT

## 2022-07-19 PROCEDURE — 80053 COMPREHEN METABOLIC PANEL: CPT

## 2022-07-19 PROCEDURE — 36415 COLL VENOUS BLD VENIPUNCTURE: CPT

## 2022-07-19 PROCEDURE — 85027 COMPLETE CBC AUTOMATED: CPT

## 2022-07-19 NOTE — LETTER
July 20, 2022      Tennille Lee  [address]   [facility] [address]        Dear MsChauLee,    We are writing to inform you of your test results.    The following letter pertains to your most recent diagnostic tests:     My name is Dr. Gustafson and I am covering for Dr. Wheeler  who is out of the office today.         -Your cholesterol panel looks healthy.     -Liver and gallbladder tests are normal for you. (ALT,AST, Alk phos, bilirubin), kidney function is normal for you (Creatinine, GFR), Sodium is normal, Potassium is normal for you, Calcium is normal for you, Glucose (blood sugar) is mildly elevated, but not in the diabetic range.     -Your complete blood counts including your hemoglobin returned normal for you.               Bottom line:  These lab results look OK for you       Follow up:  follow up with Dr. Wheeler or Dr. Méndez as previously directed      Resulted Orders   CBC with platelets   Result Value Ref Range    WBC Count 5.0 4.0 - 11.0 10e3/uL    RBC Count 5.01 3.80 - 5.20 10e6/uL    Hemoglobin 13.7 11.7 - 15.7 g/dL    Hematocrit 42.0 35.0 - 47.0 %    MCV 84 78 - 100 fL    MCH 27.3 26.5 - 33.0 pg    MCHC 32.6 31.5 - 36.5 g/dL    RDW 12.1 10.0 - 15.0 %    Platelet Count 180 150 - 450 10e3/uL   Lipid panel reflex to direct LDL Fasting   Result Value Ref Range    Cholesterol 138 <200 mg/dL    Triglycerides 96 <150 mg/dL    Direct Measure HDL 72 >=50 mg/dL    LDL Cholesterol Calculated 47 <=100 mg/dL    Non HDL Cholesterol 66 <130 mg/dL    Patient Fasting > 8hrs? Yes     Narrative    Cholesterol  Desirable:  <200 mg/dL    Triglycerides  Normal:  Less than 150 mg/dL  Borderline High:  150-199 mg/dL  High:  200-499 mg/dL  Very High:  Greater than or equal to 500 mg/dL    Direct Measure HDL  Female:  Greater than or equal to 50 mg/dL   Male:  Greater than or equal to 40 mg/dL    LDL Cholesterol  Desirable:  <100mg/dL  Above Desirable:  100-129 mg/dL   Borderline High:  130-159 mg/dL   High:   160-189 mg/dL   Very High:  >= 190 mg/dL    Non HDL Cholesterol  Desirable:  130 mg/dL  Above Desirable:  130-159 mg/dL  Borderline High:  160-189 mg/dL  High:  190-219 mg/dL  Very High:  Greater than or equal to 220 mg/dL   CK total   Result Value Ref Range    CK 78 30 - 225 U/L   Comprehensive metabolic panel (BMP + Alb, Alk Phos, ALT, AST, Total. Bili, TP)   Result Value Ref Range    Sodium 141 133 - 144 mmol/L    Potassium 3.9 3.4 - 5.3 mmol/L    Chloride 108 94 - 109 mmol/L    Carbon Dioxide (CO2) 25 20 - 32 mmol/L    Anion Gap 8 3 - 14 mmol/L    Urea Nitrogen 17 7 - 30 mg/dL    Creatinine 0.92 0.52 - 1.04 mg/dL    Calcium 9.5 8.5 - 10.1 mg/dL    Glucose 107 (H) 70 - 99 mg/dL    Alkaline Phosphatase 57 40 - 150 U/L    AST 15 0 - 45 U/L    ALT 20 0 - 50 U/L    Protein Total 6.8 6.8 - 8.8 g/dL    Albumin 3.5 3.4 - 5.0 g/dL    Bilirubin Total 1.6 (H) 0.2 - 1.3 mg/dL    GFR Estimate 62 >60 mL/min/1.73m2      Comment:      Effective December 21, 2021 eGFRcr in adults is calculated using the 2021 CKD-EPI creatinine equation which includes age and gender (Akhil et al., NEJM, DOI: 10.1056/TANOjg8353276)       If you have any questions or concerns, please call the clinic at the number listed above.       Sincerely,      Dr. Gustafson

## 2022-07-20 NOTE — RESULT ENCOUNTER NOTE
The following letter pertains to your most recent diagnostic tests:    My name is Dr. Gustafson and I am covering for Dr. Wheeler  who is out of the office today.      -Your cholesterol panel looks healthy.     -Liver and gallbladder tests are normal for you. (ALT,AST, Alk phos, bilirubin), kidney function is normal for you (Creatinine, GFR), Sodium is normal, Potassium is normal for you, Calcium is normal for you, Glucose (blood sugar) is mildly elevated, but not in the diabetic range.     -Your complete blood counts including your hemoglobin returned normal for you.             Bottom line:  These lab results look OK for you      Follow up:  follow up with Dr. Wheeler or Dr. Méndez as previously directed       Sincerely,    Dr. Gustafson

## 2022-08-01 ENCOUNTER — NURSE TRIAGE (OUTPATIENT)
Dept: FAMILY MEDICINE | Facility: CLINIC | Age: 82
End: 2022-08-01

## 2022-08-01 NOTE — TELEPHONE ENCOUNTER
"1. COVID-19 DIAGNOSIS: \"Who made your COVID-19 diagnosis?\" \"Was it confirmed by a positive lab test or self-test?\" If not diagnosed by a doctor (or NP/PA), ask \"Are there lots of cases (community spread) where you live?\" Note: See [facility] health department website, if unsure.      Home COVID test positive today.   2. COVID-19 EXPOSURE: \"Was there any known exposure to COVID before the symptoms began?\" CDC Definition of close contact: within 6 feet (2 meters) for a total of 15 minutes or more over a 24-hour period.       No known exposure, cataract surgery and follow up recently.   3. ONSET: \"When did the COVID-19 symptoms start?\"       Middle of night coughing every hour.   4. WORST SYMPTOM: \"What is your worst symptom?\" (e.g., cough, fever, shortness of breath, muscle aches)      Chills, body aches.   5. COUGH: \"Do you have a cough?\" If Yes, ask: \"How bad is the cough?\"        Started last night, sore throat.   6. FEVER: \"Do you have a fever?\" If Yes, ask: \"What is your temperature, how was it measured, and when did it start?\"      Chills, does not have a thermometer.   7. RESPIRATORY STATUS: \"Describe your breathing?\" (e.g., shortness of breath, wheezing, unable to speak)       Labored - heavier breathing. Able to walk to the bathroom without SOB. Reported having a miserable night and stating she could not go another night like this without \"Paxlovid.\"   8. BETTER-SAME-WORSE: \"Are you getting better, staying the same or getting worse compared to yesterday?\"  If getting worse, ask, \"In what way?\"      Worse symptoms today than yesterday.   9. HIGH RISK DISEASE: \"Do you have any chronic medical problems?\" (e.g., asthma, heart or lung disease, weak immune system, obesity, etc.)      81 yrs old.   10. VACCINE: \"Have you had the COVID-19 vaccine?\" If Yes, ask: \"Which one, how many shots, when did you get it?\"        Yes.   11. BOOSTER: \"Have you received your COVID-19 booster?\" If Yes, ask: \"Which one and when did you " "get it?\"       Boosted once in [place] in April.   12. PREGNANCY: \"Is there any chance you are pregnant?\" \"When was your last menstrual period?\"        No.   13. OTHER SYMPTOMS: \"Do you have any other symptoms?\"  (e.g., chills, fatigue, headache, loss of smell or taste, muscle pain, sore throat)        Headache - moderate.   14. O2 SATURATION MONITOR:  \"Do you use an oxygen saturation monitor (pulse oximeter) at home?\" If Yes, ask \"What is your reading (oxygen level) today?\" \"What is your usual oxygen saturation reading?\" (e.g., 95%)        No. Color is good per her  who is a retired doctor.     Pt refused to schedule a virtual urgent care appt for Paxlovid at this time. Advised to get a thermometer and an oximeter, increase fluids. Go to ER if SOB increases, Ox drops, fever increases, etc. Call back clinic as needed. Pt wants Dr. Méndez to send Rx for Paxlovid to [facility] [place] [address] [address] [place] [address]. Pt wanst Dr. Méndez to call her. Advised this might not be possible, but nurse will send him a urgent message.     Reason for Disposition    HIGH RISK for severe COVID complications (e.g., weak immune system, age > 64 years, obesity with BMI > 25, pregnant, chronic lung disease or other chronic medical condition) (Exception: Already seen by PCP and no new or worsening symptoms.)    MILD difficulty breathing (e.g., minimal/no SOB at rest, SOB with walking, pulse <100)    Additional Information    Negative: SEVERE difficulty breathing (e.g., struggling for each breath, speaks in single words)    Negative: Difficult to awaken or acting confused (e.g., disoriented, slurred speech)    Negative: Bluish (or gray) lips or face now    Negative: Shock suspected (e.g., cold/pale/clammy skin, too weak to stand, low BP, rapid pulse)    Negative: Sounds like a life-threatening emergency to the triager    Negative: COVID-19 and breastfeeding, questions about    Negative: [1] Diagnosed or suspected " COVID-19 AND [2] symptoms lasting 3 or more weeks    Negative: [1] COVID-19 exposure AND [2] no symptoms    Negative: COVID-19 vaccine reaction suspected (e.g., fever, headache, muscle aches) occurring 1 to 3 days after getting vaccine    Negative: COVID-19 vaccine, questions about    Negative: [1] Lives with someone known to have influenza (flu test positive) AND [2] flu-like symptoms (e.g., cough, runny nose, sore throat, SOB; with or without fever)    Negative: [1] Adult with possible COVID-19 symptoms AND [2] triager concerned about severity of symptoms or other causes    Negative: SEVERE or constant chest pain or pressure  (Exception: Mild central chest pain, present only when coughing.)    Negative: MODERATE difficulty breathing (e.g., speaks in phrases, SOB even at rest, pulse 100-120)    Negative: Headache and stiff neck (can't touch chin to chest)    Negative: Oxygen level (e.g., pulse oximetry) 90 percent or lower    Negative: Chest pain or pressure    Negative: Patient sounds very sick or weak to the triager    Negative: [1] Fever > 100.0 F (37.8 C) AND [2] bedridden (e.g., nursing home patient, CVA, chronic illness, recovering from surgery)    Negative: [1] Fever > 101 F (38.3 C) AND [2] over 60 years of age    Negative: Fever > 103 F (39.4 C)    Protocols used: CORONAVIRUS (COVID-19) DIAGNOSED OR [id number] 1.18.2022

## 2022-08-01 NOTE — TELEPHONE ENCOUNTER
"Triage nurse received a private message from Dr. Méndez stating \"pt needs to schedule a virtual urgent care visit for this.\" Pt and her  were called with provider's message. He was able to get a prescription for Paxlovid from their doctor in [place].   "

## 2022-08-15 ENCOUNTER — VIRTUAL VISIT (OUTPATIENT)
Dept: INTERNAL MEDICINE | Facility: CLINIC | Age: 82
End: 2022-08-15
Payer: MEDICARE

## 2022-08-15 DIAGNOSIS — U07.1 INFECTION DUE TO 2019 NOVEL CORONAVIRUS: Primary | ICD-10-CM

## 2022-08-15 DIAGNOSIS — R05.3 PERSISTENT COUGH: ICD-10-CM

## 2022-08-15 PROCEDURE — 99214 OFFICE O/P EST MOD 30 MIN: CPT | Mod: 95 | Performed by: INTERNAL MEDICINE

## 2022-08-15 RX ORDER — BUDESONIDE AND FORMOTEROL FUMARATE DIHYDRATE 80; 4.5 UG/1; UG/1
2 AEROSOL RESPIRATORY (INHALATION) 2 TIMES DAILY
Qty: 10.6 G | Refills: 0 | Status: SHIPPED | OUTPATIENT
Start: 2022-08-15 | End: 2023-05-22

## 2022-08-15 RX ORDER — ALBUTEROL SULFATE 90 UG/1
2 AEROSOL, METERED RESPIRATORY (INHALATION) EVERY 4 HOURS PRN
COMMUNITY
Start: 2022-08-12 | End: 2022-08-29

## 2022-08-15 NOTE — PROGRESS NOTES
Tennille is a 81 year old who is being evaluated via a billable video visit.      How would you like to obtain your AVS? MyChart  If the video visit is dropped, the invitation should be resent by: Text to cell phone: [phone]  Will anyone else be joining your video visit? No          Assessment & Plan     Infection due to 2019 novel coronavirus with persistent cough  81-year-old woman with ASCVD but no history of chronic respiratory problems.  Diagnosed with COVID-19 at the end of July and treated with Paxlovid.  Only minimal improvement in symptoms.  She has had persistent cough and dyspnea over the past 2 weeks.  Monitoring O2 sats at home and they are maintaining over 90%.  She was seen at urgent care and chest x-ray demonstrating hyperexpanded lungs and chronic interstitial changes which was not previously described but no infiltrate/consolidation.  We discussed that symptoms of COVID can last for weeks.  She needs to monitor closely for any worsening symptoms especially if she starts to having any increasing shortness of breath or hypoxemia with O2 sats under 90% in which case she should be evaluated at the ER ASAP.  I am suggesting starting Symbicort 2 puffs twice daily to help with symptom management as her could be a component of inflammation and bronchospasm present.  Prednisone was considered but she has a history of severe insomnia when taking this in the past.  Given the changes reported on chest x-ray and the persistence of symptoms, I am recommending that she be evaluated in our pulmonary clinic and I will place that referral.  - budesonide-formoterol (SYMBICORT) 80-4.5 MCG/ACT Inhaler; Inhale 2 puffs into the lungs 2 times daily  - Adult Pulmonary Medicine Referral; Future          34 minutes spent on the date of the encounter doing chart review, history and exam, documentation and further activities per the note       BMI:   Estimated body mass index is 35.57 kg/m  as calculated from the  "following:    Height as of 7/11/22: 1.613 m (5' 3.5\").    Weight as of 7/11/22: 92.5 kg (204 lb).           Return With Dr. Sifuentes if symptoms worsen or fail to improve.    Matthias Sullivan MD  [facility]    Justina Null is a 81 year old accompanied by her spouse, presenting for the following health issues: Video visit was performed today to address persistent cough and dyspnea following diagnosis of COVID-19.  See assessment plan for details.  Covid Concern (Positive covid 7-31,  had testing neg but now tested positive again yesterday, cough is terrible, SOB and back pain, would like to get a cough rx - Went to Urgent Care 8-12)        Review of Systems   No fevers or chills.  No chest pain      Objective           Vitals:  No vitals were obtained today due to virtual visit.    Physical Exam   Well-appearing elderly woman who does not look acutely ill.  Having no respiratory difficulty.    Video-Visit Details    Video Start Time: 3 PM    Type of service:  Video Visit    Video End Time:3:28 PM    Originating Location (pt. Location): Home    Distant Location (provider location):  [facility]     Platform used for Video Visit: Raman    .  ..  "

## 2022-08-16 ENCOUNTER — TELEPHONE (OUTPATIENT)
Dept: FAMILY MEDICINE | Facility: CLINIC | Age: 82
End: 2022-08-16

## 2022-08-16 NOTE — TELEPHONE ENCOUNTER
"Reason for Call:   PULMONARY MEDICINE ADULT REFERRAL  Patient needs to see a pulmonologist sooner.  put in a referral 8/15, however, they are booked until 10/27/22 and she leaves town for the winter on 10/12/22. Please write a new referral as priority.     Detailed comments: Patient requesting the referral be placed at the [facility] pulmonary clinic     Per 8/15 note: \"Given the changes reported on chest x-ray and the persistence of symptoms, I am recommending that she be evaluated in our pulmonary clinic and I will place that referral.\"    Phone Number Patient can be reached at: Home number on file [phone] (home)    Best Time: asap    Can we leave a detailed message on this number? YES    Call taken on 8/16/2022 at 11:15 AM by Arian Teresa CMA      "

## 2022-08-18 ENCOUNTER — OFFICE VISIT (OUTPATIENT)
Dept: NURSING | Facility: CLINIC | Age: 82
End: 2022-08-18
Payer: MEDICARE

## 2022-08-18 VITALS — WEIGHT: 197.9 LBS | BODY MASS INDEX: 36.42 KG/M2 | HEIGHT: 62 IN

## 2022-08-18 DIAGNOSIS — R05.9 COUGH: Primary | ICD-10-CM

## 2022-08-18 LAB
6 MIN WALK (FT): 1005 FT
6 MIN WALK (M): 306 M

## 2022-08-18 PROCEDURE — 94618 PULMONARY STRESS TESTING: CPT | Performed by: INTERNAL MEDICINE

## 2022-08-18 NOTE — TELEPHONE ENCOUNTER
That she wanted go to the [facility] in [place]?  Or is there a non [facility] clinic that she is referring to?.  If it is [facility], the referral I placed should be good for any location.  If it is not [facility], the referral can be printed out and faxed to the appropriate location.

## 2022-08-18 NOTE — PROGRESS NOTES
PFT Lab Note: Pt attempted chad unsuccessfully. Further testing deferred. 6 minute walk done for apt with Dr Metcalf.

## 2022-08-19 LAB
ERV-%PRED-PRE: 643 %
ERV-PRE: 0.5 L
ERV-PRED: 0.08 L
FIO2-PRE: 21 %
IC-%PRED-PRE: 80 %
IC-PRE: 1.96 L
IC-PRED: 2.43 L
VC-%PRED-PRE: 98 %
VC-PRE: 2.47 L
VC-PRED: 2.51 L

## 2022-08-22 ENCOUNTER — TELEPHONE (OUTPATIENT)
Dept: PULMONOLOGY | Facility: CLINIC | Age: 82
End: 2022-08-22

## 2022-08-22 NOTE — TELEPHONE ENCOUNTER
Contacted [facility]. Requested CXR from 08/12/2022 be pushed to  PACS for review.    Ingris Barbosa LPN  Pulmonary Medicine:  [facility]  Phone: [phone] Fax: [phone]

## 2022-08-24 NOTE — RESULT ENCOUNTER NOTE
Results discussed directly with patient while patient was present. Any further details documented in the note.   Tali Metcalf MD

## 2022-08-29 ENCOUNTER — OFFICE VISIT (OUTPATIENT)
Dept: PULMONOLOGY | Facility: CLINIC | Age: 82
End: 2022-08-29
Payer: MEDICARE

## 2022-08-29 VITALS — SYSTOLIC BLOOD PRESSURE: 133 MMHG | HEART RATE: 79 BPM | OXYGEN SATURATION: 97 % | DIASTOLIC BLOOD PRESSURE: 83 MMHG

## 2022-08-29 DIAGNOSIS — R06.09 DOE (DYSPNEA ON EXERTION): Primary | ICD-10-CM

## 2022-08-29 DIAGNOSIS — R05.3 PERSISTENT COUGH: ICD-10-CM

## 2022-08-29 DIAGNOSIS — U07.1 INFECTION DUE TO 2019 NOVEL CORONAVIRUS: ICD-10-CM

## 2022-08-29 PROCEDURE — 99205 OFFICE O/P NEW HI 60 MIN: CPT | Performed by: INTERNAL MEDICINE

## 2022-08-29 RX ORDER — GABAPENTIN 100 MG/1
300 CAPSULE ORAL EVERY MORNING
COMMUNITY
End: 2023-07-28

## 2022-08-29 ASSESSMENT — PAIN SCALES - GENERAL: PAINLEVEL: MODERATE PAIN (5)

## 2022-08-29 NOTE — PROGRESS NOTES
Pulmonary Clinic New Patient Consult  Reason for Consult: Cough and SOB  History of Present Illness  I had the pleasure of seeing Tennille Lee, who is an 81-year-old female who presents to clinic for evaluation of symptoms of cough and shortness of breath.    Tennille was doing relatively okay until she contracted COVID in July 2022 with mild symptoms which was treated with Paxlovid and did not require any hospitalization, she stated that she continued to have persistent shortness of breath and a nonproductive cough since that time.  In the past, she has had surveillance CT scans for recurrent lung nodules and pneumonia.  More recent CT findings have not shown any recurrence with stable nodular findings.    She was seen by her primary care physician, Dr. Sullivan who prescribed some Symbicort which has been somewhat helpful.  Today, she is doing better.  She attests to interval improvement in her symptoms particularly her shortness of breath and cough.  She continues to have pleuritic chest pain mostly around her posterior back.  She denies any orthopnea, no PND and no leg swellings.  The symptoms have improved in the last 2 weeks.  She denies any wheezing and no chest tightness.  She endorses some increased fatigue and deconditioning.   No history of lung clots, no history of unusual miscarriages.    Lifelong non-smoker, no family history of lung cancer        Review of Systems:  10 of 14 systems reviewed and are negative unless otherwise stated in HPI.    Past Medical History:   Diagnosis Date     Abdominal pain 06/2016    ct abd and pelvis with liver hemangiomas and renal cysts     ASCVD (arteriosclerotic cardiovascular disease) 8/12    angio done and med mgmt, done after abnl ct angio     Aragon's cyst     left     Degenerative disk disease     bulge cervicle disk     DJD (degenerative joint disease)      GERD (gastroesophageal reflux disease) 2002    egd done     Gilbert's disease     borderline elevated BR      Hemangioma of liver     ct angio with ?liver lesion, then ct of liver showed it be hemangioma, also renal cysts     Hx of colonoscopy 2010    [facility]     Hypercholesteremia      Hypertension      IFG (impaired fasting glucose)      Lumbar spinal stenosis 2018    done in [place], had mri     Obese     prior phen/fen   no valve dz     JOSE (obstructive sleep apnea)     cpap     Other symptoms involving cardiovascular system     bruit     Rosacea      Thyroid nodules     Dr. Guzmán--benign     TMJ (dislocation of temporomandibular joint)     left     Urine incontinence      Vertigo        Past Surgical History:   Procedure Laterality Date     carpel tunnel surgery       CORONARY ANGIOGRAPHY ADULT ORDER  12    moderate CAD     lid lag surgery       right knee arthroscopy       TONSILLECTOMY  child       Family History   Problem Relation Age of Onset     Cancer - colorectal Father      Heart Disease Father 40        MI     Myocardial Infarction Father      Heart Defect Mother      Heart Surgery Mother         CABG     Heart Surgery Brother         CABG     Heart Surgery Daughter 40        stent placed       Social History     Socioeconomic History     Marital status:      Number of children: 3   Occupational History     Occupation: homemaker   Tobacco Use     Smoking status: Former Smoker     Packs/day: 1.00     Types: Cigarettes     Start date:      Quit date: 1960     Years since quittin.2     Smokeless tobacco: Never Used   Vaping Use     Vaping Use: Never used   Substance and Sexual Activity     Alcohol use: No     Alcohol/week: 0.0 standard drinks     Drug use: No     Sexual activity: Not Currently   Other Topics Concern     Parent/sibling w/ CABG, MI or angioplasty before 65F 55M? Yes     Caffeine Concern No     Comment: decaf:  0-2 cups a day     Sleep Concern Yes     Comment: soemtimes takes lorazepam     Stress Concern Yes     Weight Concern Yes      Special Diet No     Exercise Yes     Comment: water aerobics x4 days a week     Seat Belt Yes         Allergies   Allergen Reactions     Blue Dyes Other (See Comments)     Disperse Blue 106- Blue textile Dye     Myrbetriq [Mirabegron] Rash     Amoxicillin      Balsam [place] [Amberderm]      Clindamycin Hcl      Eugenol      Flu Virus Vaccine      Neomycin Sulfate          Current Outpatient Medications:      budesonide-formoterol (SYMBICORT) 80-4.5 MCG/ACT Inhaler, Inhale 2 puffs into the lungs 2 times daily, Disp: 10.6 g, Rfl: 0     Coenzyme Q10 (COQ-10 PO), Take 100 mg by mouth three times a week , Disp: , Rfl:      cyanocobalamin (VITAMIN  B-12) 1000 MCG tablet, Take 1,000 mcg by mouth daily, Disp: , Rfl:      ezetimibe (ZETIA) 10 MG tablet, Take 1 tablet (10 mg) by mouth daily, Disp: 90 tablet, Rfl: 3     gabapentin (NEURONTIN) 100 MG capsule, Take 100 mg by mouth 3 times daily, Disp: , Rfl:      MAGNESIUM OXIDE PO, Take 250 mg by mouth. 3x/week , Disp: , Rfl:      omeprazole (PRILOSEC) 20 MG DR capsule, Take 1 capsule (20 mg) by mouth daily, Disp: 90 capsule, Rfl: 2     ROSUVASTATIN CALCIUM PO, ROSUVASTATIN CALCIUM TABS, Disp: , Rfl:      traZODone (DESYREL) 100 MG tablet, Take 100 mg by mouth At Bedtime, Disp: , Rfl:      albuterol (PROAIR HFA/PROVENTIL HFA/VENTOLIN HFA) 108 (90 Base) MCG/ACT inhaler, Inhale 2 puffs into the lungs every 4 hours as needed (Patient not taking: Reported on 8/29/2022), Disp: , Rfl:      calcium carbonate-vitamin D 600-200 MG-UNIT TABS, Take by mouth daily (Patient not taking: Reported on 8/29/2022), Disp: 60 tablet, Rfl:       Physical Exam:  /83 (BP Location: Left arm, Patient Position: Sitting, Cuff Size: Adult Large)   Pulse 79   SpO2 97%   GENERAL: Well developed, well nourished, alert, and in no apparent distress.  HEENT: Normocephalic, atraumatic. PERRL, EOMI. Oral mucosa is moist. No perioral cyanosis.  NECK: supple, no masses, no thyromegaly.  RESP:  Normal  respiratory effort.  CTAB.  No rales, wheezes, rhonchi.  No cyanosis or clubbing.  CV: Normal S1, S2, regular rhythm, normal rate. No murmur.  No LE edema.   ABDOMEN:  Soft, non-tender, non-distended.   SKIN: warm and dry. No rash.  NEURO: AAOx3.  Normal gait.  Fluent speech.  PSYCH: mentation appears normal.       Results:  Imaging (personally reviewed in clinic today): CXR 08/12/2022  Impression:   Hyperinflation and chronic interstitial change without evidence of dense consolidation.     Assessment and Plan:   Post COVID Symptoms  Symptoms have improved more lately.  She continues to have underlying pleuritic chest and back pain which I suspect is due to underlying pleurisy, I encouraged that she takes a low-dose NSAID for a brief period of time.  Bronchodilators have been helpful.   Unfortunately, she was not able to perform her PFTs as she had difficulty following directions.  No further work-up is warranted at this time as she continues to improve.  We discussed the possibility of getting a CTA chest pulmonary to rule out pulmonary embolism but pretest probability is very low.  More recent CT chest have been reassuring.  She will benefit from pulmonary rehab which I have referred her for.    Pulmonary Nodule  Stability throughout the years.  No need for further surveillance    Questions and concerns were answered to the patient's satisfaction.  she was provided with my contact information should new questions or concerns arise in the interim.  She should return to clinic in 12 months   Up to date on vaccination  I spent a total of 60 minutes face to face with Tennille Lee during today's office visit. Over 50% of this time was spent counseling the patient and/or coordinating care regarding their pulmonary disease.    Tali Metcalf MD  Pulmonary, Critical Care and Sleep Medicine  [facility]  Pager: [phone]        The above note was dictated using voice recognition software and may  include typographical errors. Please contact the author for any clarifications.

## 2022-08-29 NOTE — NURSING NOTE
Tennille Lee's goals for this visit include: mid back pain in lung area  She requests these members of her care team be copied on today's visit information: YES    PCP: Shawn Méndez    Referring Provider:  Matthias Sullivan MD  [address] [place] [address]    /83 (BP Location: Left arm, Patient Position: Sitting, Cuff Size: Adult Large)   Pulse 79   SpO2 97%     Do you need any medication refills at today's visit? NONE    Eduard Pickard, EMT

## 2022-09-01 ENCOUNTER — TELEPHONE (OUTPATIENT)
Dept: PULMONOLOGY | Facility: CLINIC | Age: 82
End: 2022-09-01

## 2022-09-01 DIAGNOSIS — R06.09 DOE (DYSPNEA ON EXERTION): Primary | ICD-10-CM

## 2022-09-01 DIAGNOSIS — U09.9 CHRONIC POST-COVID-19 SYNDROME: ICD-10-CM

## 2022-09-01 NOTE — TELEPHONE ENCOUNTER
[facility] Call Center    Phone Message    May a detailed message be left on voicemail: yes     Reason for Call: Pt is requesting a call back to let her know how she can get her pulmonary rehab scheduled.  She said someone was supposed to call her to set this up and she hasn't heard from anyone yet.  Thanks.    Action Taken: Message routed to:  Adult Clinics: Pulmonology p [id number]    Travel Screening: Not Applicable

## 2022-09-01 NOTE — TELEPHONE ENCOUNTER
Received 2 separate CD's of CT chest images from [facility]. CD's have been sent to the [facility] imaging team in order to be uploaded into the patient's chart.    Ingris Barbosa LPN  Pulmonary Medicine:  [facility]  Phone: [phone] Fax: [phone]

## 2022-09-01 NOTE — TELEPHONE ENCOUNTER
Contacted patient regarding pulmonary rehab referral. Chart reviewed, pulmonary rehab referral has not been placed. Referral for pulmonary rehab has been pended and sent to Dr. Metcalf for review/signature. Informed patient that once Dr. Metcalf has signed referral, a member from the referrals team should reach out to her in order to set up the pulmonary rehab. Encouraged call back to clinic if there are any issues. Patient expressed understanding.    Ingris Barbosa LPN  Pulmonary Medicine:  [facility]  Phone: [phone] Fax: [phone]

## 2022-09-09 ENCOUNTER — OFFICE VISIT (OUTPATIENT)
Dept: FAMILY MEDICINE | Facility: CLINIC | Age: 82
End: 2022-09-09
Payer: MEDICARE

## 2022-09-09 VITALS
DIASTOLIC BLOOD PRESSURE: 84 MMHG | OXYGEN SATURATION: 97 % | HEART RATE: 75 BPM | RESPIRATION RATE: 16 BRPM | WEIGHT: 200 LBS | HEIGHT: 62 IN | SYSTOLIC BLOOD PRESSURE: 126 MMHG | BODY MASS INDEX: 36.8 KG/M2

## 2022-09-09 DIAGNOSIS — H61.21 IMPACTED CERUMEN OF RIGHT EAR: Primary | ICD-10-CM

## 2022-09-09 PROCEDURE — 69209 REMOVE IMPACTED EAR WAX UNI: CPT | Performed by: NURSE PRACTITIONER

## 2022-09-09 NOTE — NURSING NOTE
Patient identified using two patient identifiers.  Ear exam showing wax occlusion completed by provider.  Solution: warm water was placed in the right ear(s) via irrigation tool: elephant ear.  Alessia Garcia MA

## 2022-09-09 NOTE — PROGRESS NOTES
"  Assessment & Plan   Problem List Items Addressed This Visit    None     Visit Diagnoses     Impacted cerumen of right ear    -  Primary    Relevant Orders    IN REMOVAL IMPACTED CERUMEN IRRIGATION/LVG UNILAT (Completed)         CMA completed cerumen removal by lavage     VITO Polanco CNP  [facility] LOREE Null is a 81 year old, presenting for the following health issues:  No chief complaint on file.      HPI     Ear cleaning  Can't hear     Review of Systems   Detailed as above       Objective    /84 (BP Location: Right arm, Patient Position: Sitting, Cuff Size: Adult Regular)   Pulse 75   Resp 16   Ht 1.562 m (5' 1.5\")   Wt 90.7 kg (200 lb)   SpO2 97%   BMI 37.18 kg/m    There is no height or weight on file to calculate BMI.  Physical Exam   NAD  R cerumen impaction               "

## 2022-09-16 ENCOUNTER — TRANSFERRED RECORDS (OUTPATIENT)
Dept: HEALTH INFORMATION MANAGEMENT | Facility: CLINIC | Age: 82
End: 2022-09-16

## 2022-10-04 ENCOUNTER — HOSPITAL ENCOUNTER (OUTPATIENT)
Dept: CARDIAC REHAB | Facility: CLINIC | Age: 82
Discharge: HOME | End: 2022-10-04
Attending: INTERNAL MEDICINE
Payer: MEDICARE

## 2022-10-04 DIAGNOSIS — R06.09 DOE (DYSPNEA ON EXERTION): ICD-10-CM

## 2022-10-04 DIAGNOSIS — U09.9 CHRONIC POST-COVID-19 SYNDROME: ICD-10-CM

## 2022-10-04 NOTE — PROGRESS NOTES
Patient arrives to pulmonary rehab today but will not be able to attend due to going to [place] for the winter next week. Patient does not appear interested in attending rehab at this time but encouraged to look for pulmonary rehab location near [place], [place]. Patient provided handout on PLB technique to utilize to control dyspnea with exertion. Patient given a handout to perform aerobic exercise independently and discussed home exercise guidelines. Patient reports she plans to attend gym to perform exercise and continue with water aerobics.       15 minutes spent 1:1 with patient.

## 2022-10-17 ENCOUNTER — OFFICE VISIT (OUTPATIENT)
Dept: URBAN - METROPOLITAN AREA CLINIC 37 | Facility: CLINIC | Age: 82
End: 2022-10-17

## 2022-10-17 DIAGNOSIS — H00.012 HORDEOLUM EXTERNUM RIGHT LOWER EYELID: Primary | ICD-10-CM

## 2022-10-17 PROCEDURE — 99213 OFFICE O/P EST LOW 20 MIN: CPT | Performed by: OPTOMETRIST

## 2022-10-17 RX ORDER — TOBRAMYCIN AND DEXAMETHASONE 3; 1 MG/G; MG/G
OINTMENT OPHTHALMIC
Qty: 3.5 | Refills: 0 | Status: ACTIVE
Start: 2022-10-17

## 2022-10-17 RX ORDER — DOXYCYCLINE HYCLATE 100 MG/1
100 MG TABLET, COATED ORAL
Qty: 14 | Refills: 0 | Status: ACTIVE
Start: 2022-10-17

## 2022-10-17 NOTE — IMPRESSION/PLAN
Impression: Hordeolum externum right lower eyelid: H00.012. Plan: WCs w baby shampoo,  DOXY 100 mg BID PO x 7 days, tobradex vilma QHS x 7 days, 

rtc 1 week DFE for floaters

## 2022-10-23 ENCOUNTER — HEALTH MAINTENANCE LETTER (OUTPATIENT)
Age: 82
End: 2022-10-23

## 2022-10-24 ENCOUNTER — OFFICE VISIT (OUTPATIENT)
Dept: URBAN - METROPOLITAN AREA CLINIC 37 | Facility: CLINIC | Age: 82
End: 2022-10-24
Payer: MEDICARE

## 2022-10-24 DIAGNOSIS — H43.811 VITREOUS DEGENERATION, RIGHT EYE: ICD-10-CM

## 2022-10-24 DIAGNOSIS — H00.012 HORDEOLUM EXTERNUM RIGHT LOWER EYELID: Primary | ICD-10-CM

## 2022-10-24 DIAGNOSIS — Z96.1 PRESENCE OF INTRAOCULAR LENS: ICD-10-CM

## 2022-10-24 DIAGNOSIS — H25.12 AGE-RELATED NUCLEAR CATARACT, LEFT EYE: ICD-10-CM

## 2022-10-24 PROCEDURE — 99214 OFFICE O/P EST MOD 30 MIN: CPT | Performed by: OPTOMETRIST

## 2022-10-24 ASSESSMENT — INTRAOCULAR PRESSURE
OD: 14
OS: 16

## 2022-10-24 NOTE — IMPRESSION/PLAN
Impression: Hordeolum externum right lower eyelid: H00.012.  Plan: cont w gtts x 2 days/ cont w WCs w baby shampoo, AFTs as needed

## 2022-11-21 ENCOUNTER — TRANSFERRED RECORDS (OUTPATIENT)
Dept: HEALTH INFORMATION MANAGEMENT | Facility: CLINIC | Age: 82
End: 2022-11-21

## 2022-12-30 ENCOUNTER — TRANSFERRED RECORDS (OUTPATIENT)
Dept: HEALTH INFORMATION MANAGEMENT | Facility: CLINIC | Age: 82
End: 2022-12-30

## 2023-01-13 ENCOUNTER — TRANSFERRED RECORDS (OUTPATIENT)
Dept: HEALTH INFORMATION MANAGEMENT | Facility: CLINIC | Age: 83
End: 2023-01-13

## 2023-01-18 ENCOUNTER — TRANSFERRED RECORDS (OUTPATIENT)
Dept: HEALTH INFORMATION MANAGEMENT | Facility: CLINIC | Age: 83
End: 2023-01-18

## 2023-03-15 ENCOUNTER — TELEPHONE (OUTPATIENT)
Dept: CARDIOLOGY | Facility: CLINIC | Age: 83
End: 2023-03-15
Payer: MEDICARE

## 2023-03-15 NOTE — TELEPHONE ENCOUNTER
[facility] Call Center    Phone Message    May a detailed message be left on voicemail: yes     Reason for Call: Order(s): Other:   Reason for requested: Pt thinks Dr. Cook would want labs prior to her appt w/him on 7.28.23.  Please call pt to schedule if labs are to be ordered.  Date needed: no hurry  Provider name: Dr. Gill      Action Taken: Message routed to:  [facility]    Travel Screening: Not Applicable     Thank you!  [facility]

## 2023-03-15 NOTE — TELEPHONE ENCOUNTER
No labs requested/ordered by JAYDE Guthrie or Dr. Gill. Patient had labs completed for BMP, CBC, FLP and CMP in Epic in 07/2022.

## 2023-04-02 ENCOUNTER — HEALTH MAINTENANCE LETTER (OUTPATIENT)
Age: 83
End: 2023-04-02

## 2023-04-24 ENCOUNTER — TRANSFERRED RECORDS (OUTPATIENT)
Dept: FAMILY MEDICINE | Facility: CLINIC | Age: 83
End: 2023-04-24

## 2023-04-26 ENCOUNTER — TRANSFERRED RECORDS (OUTPATIENT)
Dept: HEALTH INFORMATION MANAGEMENT | Facility: CLINIC | Age: 83
End: 2023-04-26
Payer: MEDICARE

## 2023-05-02 DIAGNOSIS — I10 HTN (HYPERTENSION): ICD-10-CM

## 2023-05-02 DIAGNOSIS — E78.5 HYPERLIPIDEMIA LDL GOAL <130: Primary | ICD-10-CM

## 2023-05-22 ENCOUNTER — OFFICE VISIT (OUTPATIENT)
Dept: FAMILY MEDICINE | Facility: CLINIC | Age: 83
End: 2023-05-22
Payer: MEDICARE

## 2023-05-22 VITALS
OXYGEN SATURATION: 97 % | BODY MASS INDEX: 38.28 KG/M2 | HEIGHT: 62 IN | DIASTOLIC BLOOD PRESSURE: 69 MMHG | HEART RATE: 69 BPM | WEIGHT: 208 LBS | RESPIRATION RATE: 16 BRPM | SYSTOLIC BLOOD PRESSURE: 110 MMHG

## 2023-05-22 DIAGNOSIS — Z01.818 PRE-OP EXAMINATION: Primary | ICD-10-CM

## 2023-05-22 DIAGNOSIS — M25.512 ACUTE PAIN OF LEFT SHOULDER: ICD-10-CM

## 2023-05-22 DIAGNOSIS — G89.29 CHRONIC LOW BACK PAIN, UNSPECIFIED BACK PAIN LATERALITY, UNSPECIFIED WHETHER SCIATICA PRESENT: ICD-10-CM

## 2023-05-22 DIAGNOSIS — H26.9 CATARACT, UNSPECIFIED CATARACT TYPE, UNSPECIFIED LATERALITY: ICD-10-CM

## 2023-05-22 DIAGNOSIS — E66.01 MORBID OBESITY (H): ICD-10-CM

## 2023-05-22 DIAGNOSIS — N18.31 CHRONIC KIDNEY DISEASE, STAGE 3A (H): ICD-10-CM

## 2023-05-22 DIAGNOSIS — I25.10 ASCVD (ARTERIOSCLEROTIC CARDIOVASCULAR DISEASE): ICD-10-CM

## 2023-05-22 DIAGNOSIS — E78.5 HYPERLIPIDEMIA LDL GOAL <130: ICD-10-CM

## 2023-05-22 DIAGNOSIS — R06.09 DOE (DYSPNEA ON EXERTION): ICD-10-CM

## 2023-05-22 DIAGNOSIS — M54.50 CHRONIC LOW BACK PAIN, UNSPECIFIED BACK PAIN LATERALITY, UNSPECIFIED WHETHER SCIATICA PRESENT: ICD-10-CM

## 2023-05-22 DIAGNOSIS — G47.33 OSA (OBSTRUCTIVE SLEEP APNEA): ICD-10-CM

## 2023-05-22 DIAGNOSIS — I10 PRIMARY HYPERTENSION: ICD-10-CM

## 2023-05-22 LAB
ANION GAP SERPL CALCULATED.3IONS-SCNC: 10 MMOL/L (ref 7–15)
BUN SERPL-MCNC: 14.4 MG/DL (ref 8–23)
CALCIUM SERPL-MCNC: 9.8 MG/DL (ref 8.8–10.2)
CHLORIDE SERPL-SCNC: 105 MMOL/L (ref 98–107)
CHOLEST SERPL-MCNC: 133 MG/DL
CREAT SERPL-MCNC: 0.97 MG/DL (ref 0.51–0.95)
DEPRECATED HCO3 PLAS-SCNC: 27 MMOL/L (ref 22–29)
ERYTHROCYTE [DISTWIDTH] IN BLOOD BY AUTOMATED COUNT: 12.2 % (ref 10–15)
GFR SERPL CREATININE-BSD FRML MDRD: 58 ML/MIN/1.73M2
GLUCOSE SERPL-MCNC: 96 MG/DL (ref 70–99)
HCT VFR BLD AUTO: 42.6 % (ref 35–47)
HDLC SERPL-MCNC: 68 MG/DL
HGB BLD-MCNC: 13.7 G/DL (ref 11.7–15.7)
LDLC SERPL CALC-MCNC: 44 MG/DL
MCH RBC QN AUTO: 27.4 PG (ref 26.5–33)
MCHC RBC AUTO-ENTMCNC: 32.2 G/DL (ref 31.5–36.5)
MCV RBC AUTO: 85 FL (ref 78–100)
NONHDLC SERPL-MCNC: 65 MG/DL
PLATELET # BLD AUTO: 208 10E3/UL (ref 150–450)
POTASSIUM SERPL-SCNC: 4.1 MMOL/L (ref 3.4–5.3)
RBC # BLD AUTO: 5 10E6/UL (ref 3.8–5.2)
SODIUM SERPL-SCNC: 142 MMOL/L (ref 136–145)
TRIGL SERPL-MCNC: 106 MG/DL
WBC # BLD AUTO: 5.7 10E3/UL (ref 4–11)

## 2023-05-22 PROCEDURE — 85027 COMPLETE CBC AUTOMATED: CPT | Performed by: INTERNAL MEDICINE

## 2023-05-22 PROCEDURE — 80061 LIPID PANEL: CPT | Performed by: INTERNAL MEDICINE

## 2023-05-22 PROCEDURE — 99214 OFFICE O/P EST MOD 30 MIN: CPT | Performed by: INTERNAL MEDICINE

## 2023-05-22 PROCEDURE — 36415 COLL VENOUS BLD VENIPUNCTURE: CPT | Performed by: INTERNAL MEDICINE

## 2023-05-22 PROCEDURE — 80048 BASIC METABOLIC PNL TOTAL CA: CPT | Performed by: INTERNAL MEDICINE

## 2023-05-22 ASSESSMENT — PAIN SCALES - GENERAL: PAINLEVEL: WORST PAIN (10)

## 2023-05-22 NOTE — PATIENT INSTRUCTIONS
Get the echocardiogram  I will send you the labs from today  See Dr. Huang  I would also follow up with the lung doctor.  I would see ortho for the shoulder.  Dr. Munguia or Rui at [facility] in [place] are very good.      Shawn Méndez M.D.

## 2023-05-22 NOTE — PROGRESS NOTES
[address], [address]  [facility] [address]  Phone: [phone]  Primary Provider: Shawn Méndez  Pre-op Performing Provider: SHAWN MÉNDEZ      PREOPERATIVE EVALUATION:  Today's date: 5/22/2023    Tennille Lee is a 82 year old female who presents for a preoperative evaluation.    Surgical Information:  Surgery/Procedure: Cataract Removal Left Eye  Surgery Location: [facility]  Surgeon: Dr Huertas  Surgery Date: 6/2/23  Time of Surgery: TBD  Where patient plans to recover: At home with family  Fax number for surgical facility: [phone]        Subjective       HPI related to upcoming procedure: This is a very pleasant 82-year-old who is here with her  for a preop.  She had multiple issues to go over today which we addressed.    In the last 10 days she has had nontraumatic left shoulder pain.  She has no rest pain.  It only hurts when she moves her left shoulder.  It does not radiate down the arm and there is no arm tingling numbness or weakness.  At times she can feel it go up towards the neck but is not affected by neck movements.  Nothing on the right side.  No fevers or night sweats.  No chest pain.    She has had shortness of breath since she had COVID in August.  As noted and reviewed she was seen by pulmonary here then she was seen by pulmonary in [place].  It has not worsened but it has not gotten much better.  She gets short of breath only with walking and not at rest.  No PND.  No coughs.  No fevers.  When she saw pulmonary she was told to take a steroid pack but she had just gotten an epidural so she has not done this yet.    Patient has chronic low back pain for years.  She had 2 recent epidurals and they did seem to help.    The patient has CKD, she has morbid obesity.  She has mild coronary disease.  She has hypertension.  She has hyperlipidemia and sleep apnea for which he uses CPAP.  Her review of systems is otherwise negative.                 Past Medical History:      Past Medical History:   Diagnosis Date     Abdominal pain 2016    ct abd and pelvis with liver hemangiomas and renal cysts     ASCVD (arteriosclerotic cardiovascular disease)     angio done and med mgmt, done after abnl ct angio     Aragon's cyst     left     Degenerative disk disease     bulge cervicle disk     DJD (degenerative joint disease)      GERD (gastroesophageal reflux disease)     egd done     Gilbert's disease     borderline elevated BR     Hemangioma of liver     ct angio with ?liver lesion, then ct of liver showed it be hemangioma, also renal cysts     Hx of colonoscopy     [facility]     Hypercholesteremia      Hypertension      IFG (impaired fasting glucose)      Lumbar spinal stenosis 2018    done in [place], had mri     Obese     prior phen/fen   no valve dz     JOSE (obstructive sleep apnea)     cpap     Other symptoms involving cardiovascular system     bruit     Rosacea      Thyroid nodules     Dr. Guzmán--benign     TMJ (dislocation of temporomandibular joint)     left     Urine incontinence      Vertigo              Past Surgical History:      Past Surgical History:   Procedure Laterality Date     carpel tunnel surgery  2011     CATARACT EXTRACTION  2022     CORONARY ANGIOGRAPHY ADULT ORDER  2012    moderate CAD     lid lag surgery  2004     right knee arthroscopy  1999     TONSILLECTOMY  child             Social History:     Social History     Tobacco Use     Smoking status: Former     Packs/day: 1.00     Types: Cigarettes     Start date:      Quit date: 1960     Years since quittin.0     Smokeless tobacco: Never   Vaping Use     Vaping status: Never Used   Substance Use Topics     Alcohol use: No     Alcohol/week: 0.0 standard drinks of alcohol             Family History:   No family history of bleeding difficulty, anesthesia problems or blood clots.         Allergies:     Allergies  "  Allergen Reactions     Blue Dyes (Parenteral) Other (See Comments)     Disperse Blue 106- Blue textile Dye     Myrbetriq [Mirabegron] Rash     Amoxicillin      Balsam Peru-Castor [Balsam Peru-Castor Oil]      Clindamycin Hcl      Eugenol      Influenza Virus Vaccine      Neomycin Sulfate              Medications:     Current Outpatient Medications   Medication Sig Dispense Refill     Coenzyme Q10 (COQ-10 PO) Take 100 mg by mouth three times a week        cyanocobalamin (VITAMIN  B-12) 1000 MCG tablet Take 1,000 mcg by mouth daily       ezetimibe (ZETIA) 10 MG tablet Take 1 tablet (10 mg) by mouth daily 90 tablet 3     gabapentin (NEURONTIN) 100 MG capsule Take 300 mg by mouth every morning       MAGNESIUM OXIDE PO Take 250 mg by mouth. 3x/week        omeprazole (PRILOSEC) 20 MG DR capsule Take 1 capsule (20 mg) by mouth daily 90 capsule 2     ROSUVASTATIN CALCIUM PO ROSUVASTATIN CALCIUM TABS       traZODone (DESYREL) 100 MG tablet Take 100 mg by mouth At Bedtime                 Review of Systems:     No history of bleeding difficulty, anesthesia problems or blood clots.  The 10 point Review of Systems is negative other than noted in the HPI           Physical Exam:   Blood pressure 110/69, pulse 69, resp. rate 16, height 1.562 m (5' 1.5\"), weight 94.3 kg (208 lb), SpO2 97 %, not currently breastfeeding.    Constitutional: healthy appearing, alert and in no distress  Heent: Normocephalic. Head without obvious masses or lesions. PERRLDC, EOMI. Mouth exam within normal limits: tongue, mucous membranes, posterior pharynx all normal, no lesions or abnormalities seen. Neck supple, no nuchal rigidity or masses. No supraclavicular, or cervical adenopathy. Thyroid symmetric, no masses.  Cardiovascular: Regular rate and rhythm, no murmer, rub or gallops.  JVP not elevated, no edema.  Carotids within normal limits bilaterally, no bruits.  Respiratory: Normal respiratory effort.  Lungs clear, normal flow, no wheezing or " crackles.  Gastrointestinal: Normal active bowel sounds.   Soft, not tender, no masses, guarding or rebound.  No hepatosplenomegaly.   Musculoskeletal: extremities normal, no gross deformities noted.  Skin: no suspicious lesions or rashes   Neurologic: Mental status within normal limits.  Speech fluent.  No gross motor abnormalities and gait intact.  Psychiatric: mentation appears normal and affect normal.         Data:   Ct chest and mri from [place] reviewed, reports only        Assessment:   1. Normal upper exam for cataract surgery.  She does have significant dyspnea on exertion but nothing at rest and can lie down without difficulty.  Given this and the low risk procedure I believe it is okay for her to proceed.  2.  Chronic dyspnea on exertion, suspect it is obesity related and possibly post-COVID.  I think it is unlikely to be some other primary lung disease and I think cardiac is unlikely or PE.  I will get an echo however.  She is seeing cardiology here in July.  I would also like her to follow-up with pulmonary.  3.  Acute left shoulder pain.  She does have pain with range of motion.  No other pain.  I think this is shoulder related and not neck or other source.  4.  Chronic low back pain, recent epidural.  5.  CKD, follow-up labs today.  6.  Morbid obesity  7.  Coronary artery disease, medical management, doubt this is causing her shortness of breath.  8.  Hypertension, controlled.  9.  Hyperlipidemia, follow-up labs.  Sleep apnea, CPAP         Plan:   The patient is ok for the procedure and can take her patient is a day of surgery  Echocardiogram  Labs now  See cardiology  See pulmonary  Call if worsens.  She did not want a COVID booster today.      Shawn Méndez M.D.

## 2023-05-22 NOTE — RESULT ENCOUNTER NOTE
It was nice to see you today.  Your labs look very good.  Your cbc is normal and your cholesterol is very good at 133.  The hdl or good and ldl or bad cholesterol are super.    Your blood salts, sugar and kidney tests are also fine.    Let me know if you have questions.    Shawn

## 2023-05-24 ENCOUNTER — HOSPITAL ENCOUNTER (OUTPATIENT)
Dept: CARDIOLOGY | Facility: CLINIC | Age: 83
Discharge: HOME | End: 2023-05-24
Attending: INTERNAL MEDICINE | Admitting: INTERNAL MEDICINE
Payer: MEDICARE

## 2023-05-24 DIAGNOSIS — R06.09 DOE (DYSPNEA ON EXERTION): ICD-10-CM

## 2023-05-24 LAB — LVEF ECHO: NORMAL

## 2023-05-24 PROCEDURE — 93306 TTE W/DOPPLER COMPLETE: CPT

## 2023-05-24 PROCEDURE — 93306 TTE W/DOPPLER COMPLETE: CPT | Mod: 26 | Performed by: INTERNAL MEDICINE

## 2023-06-27 ENCOUNTER — TRANSFERRED RECORDS (OUTPATIENT)
Dept: HEALTH INFORMATION MANAGEMENT | Facility: CLINIC | Age: 83
End: 2023-06-27
Payer: MEDICARE

## 2023-07-25 ENCOUNTER — LAB (OUTPATIENT)
Dept: LAB | Facility: CLINIC | Age: 83
End: 2023-07-25
Payer: MEDICARE

## 2023-07-25 DIAGNOSIS — I10 HTN (HYPERTENSION): ICD-10-CM

## 2023-07-25 DIAGNOSIS — E78.5 HYPERLIPIDEMIA LDL GOAL <130: ICD-10-CM

## 2023-07-25 LAB
ALT SERPL W P-5'-P-CCNC: 20 U/L (ref 0–50)
ANION GAP SERPL CALCULATED.3IONS-SCNC: 10 MMOL/L (ref 7–15)
BUN SERPL-MCNC: 15.2 MG/DL (ref 8–23)
CALCIUM SERPL-MCNC: 9.8 MG/DL (ref 8.8–10.2)
CHLORIDE SERPL-SCNC: 105 MMOL/L (ref 98–107)
CHOLEST SERPL-MCNC: 143 MG/DL
CREAT SERPL-MCNC: 0.98 MG/DL (ref 0.51–0.95)
DEPRECATED HCO3 PLAS-SCNC: 28 MMOL/L (ref 22–29)
GFR SERPL CREATININE-BSD FRML MDRD: 57 ML/MIN/1.73M2
GLUCOSE SERPL-MCNC: 101 MG/DL (ref 70–99)
HDLC SERPL-MCNC: 70 MG/DL
LDLC SERPL CALC-MCNC: 54 MG/DL
NONHDLC SERPL-MCNC: 73 MG/DL
POTASSIUM SERPL-SCNC: 4.4 MMOL/L (ref 3.4–5.3)
SODIUM SERPL-SCNC: 143 MMOL/L (ref 136–145)
TRIGL SERPL-MCNC: 93 MG/DL

## 2023-07-25 PROCEDURE — 80048 BASIC METABOLIC PNL TOTAL CA: CPT | Performed by: INTERNAL MEDICINE

## 2023-07-25 PROCEDURE — 84460 ALANINE AMINO (ALT) (SGPT): CPT | Performed by: INTERNAL MEDICINE

## 2023-07-25 PROCEDURE — 80061 LIPID PANEL: CPT | Performed by: INTERNAL MEDICINE

## 2023-07-25 PROCEDURE — 36415 COLL VENOUS BLD VENIPUNCTURE: CPT | Performed by: INTERNAL MEDICINE

## 2023-07-28 ENCOUNTER — OFFICE VISIT (OUTPATIENT)
Dept: CARDIOLOGY | Facility: CLINIC | Age: 83
End: 2023-07-28
Payer: MEDICARE

## 2023-07-28 VITALS
SYSTOLIC BLOOD PRESSURE: 122 MMHG | HEIGHT: 62 IN | WEIGHT: 202 LBS | DIASTOLIC BLOOD PRESSURE: 76 MMHG | OXYGEN SATURATION: 96 % | HEART RATE: 70 BPM | BODY MASS INDEX: 37.17 KG/M2

## 2023-07-28 DIAGNOSIS — I20.89 OTHER FORMS OF ANGINA PECTORIS (H): ICD-10-CM

## 2023-07-28 DIAGNOSIS — I25.10 CORONARY ARTERY DISEASE INVOLVING NATIVE CORONARY ARTERY OF NATIVE HEART WITHOUT ANGINA PECTORIS: Primary | ICD-10-CM

## 2023-07-28 PROCEDURE — 99213 OFFICE O/P EST LOW 20 MIN: CPT | Performed by: INTERNAL MEDICINE

## 2023-07-28 RX ORDER — ROSUVASTATIN CALCIUM 10 MG/1
10 TABLET, COATED ORAL DAILY
COMMUNITY
End: 2024-07-09

## 2023-07-28 NOTE — PROGRESS NOTES
HPI and Plan:       Orders Placed This Encounter   Procedures    Basic metabolic panel    Lipid Profile    Follow-Up with Cardiology     Orders Placed This Encounter   Medications    rosuvastatin (CRESTOR) 10 MG tablet     Sig: Take 10 mg by mouth daily Takes 2-10mg (20mg) tabs every other day and 1-10mg tab the other days    Magnesium Oxide 250 MG TABS     Sig: Take 250 mg by mouth daily Takes 3 times per week     Medications Discontinued During This Encounter   Medication Reason    ROSUVASTATIN CALCIUM PO Med Rec(No AVS / No eCancel)    gabapentin (NEURONTIN) 100 MG capsule Med Rec(No AVS / No eCancel)    MAGNESIUM OXIDE PO Med Rec(No AVS / No eCancel)         Encounter Diagnoses   Name Primary?    Coronary artery disease involving native coronary artery of native heart without angina pectoris Yes    Other forms of angina pectoris (H)        CURRENT MEDICATIONS:  Current Outpatient Medications   Medication Sig Dispense Refill    Coenzyme Q10 (COQ-10 PO) Take 100 mg by mouth three times a week       cyanocobalamin (VITAMIN  B-12) 1000 MCG tablet Take 1,000 mcg by mouth daily      ezetimibe (ZETIA) 10 MG tablet Take 1 tablet (10 mg) by mouth daily 90 tablet 3    Magnesium Oxide 250 MG TABS Take 250 mg by mouth daily Takes 3 times per week      omeprazole (PRILOSEC) 20 MG DR capsule Take 1 capsule (20 mg) by mouth daily 90 capsule 2    rosuvastatin (CRESTOR) 10 MG tablet Take 10 mg by mouth daily Takes 2-10mg (20mg) tabs every other day and 1-10mg tab the other days      traZODone (DESYREL) 100 MG tablet Take 100 mg by mouth At Bedtime         ALLERGIES     Allergies   Allergen Reactions    Blue Dyes (Parenteral) Other (See Comments)     Disperse Blue 106- Blue textile Dye    Myrbetriq [Mirabegron] Rash    Amoxicillin     Balsam Peru-Castor [Balsam Peru-Castor Oil]     Clindamycin Hcl     Eugenol     Influenza Virus Vaccine     Neomycin Sulfate        PAST MEDICAL HISTORY:  Past Medical History:   Diagnosis Date     Abdominal pain 06/2016    ct abd and pelvis with liver hemangiomas and renal cysts    ASCVD (arteriosclerotic cardiovascular disease) 8/12    angio done and med mgmt, done after abnl ct angio    Aragon's cyst     left    Degenerative disk disease     bulge cervicle disk    DJD (degenerative joint disease)     GERD (gastroesophageal reflux disease) 2002    egd done    Gilbert's disease     borderline elevated BR    Hemangioma of liver 9/12    ct angio with ?liver lesion, then ct of liver showed it be hemangioma, also renal cysts    Hx of colonoscopy 2010    [facility]    Hypercholesteremia     Hypertension     IFG (impaired fasting glucose)     Lumbar spinal stenosis 2018    done in [place], had mri    Obese     prior phen/fen   no valve dz    JOSE (obstructive sleep apnea) 2008    cpap    Other symptoms involving cardiovascular system     bruit    Rosacea     Thyroid nodules 2011    Dr. Guzmán--benign    TMJ (dislocation of temporomandibular joint)     left    Urine incontinence     Vertigo        PAST SURGICAL HISTORY:  Past Surgical History:   Procedure Laterality Date    carpel tunnel surgery  01/01/2011    CATARACT EXTRACTION  08/2022    CORONARY ANGIOGRAPHY ADULT ORDER  08/07/2012    moderate CAD    lid lag surgery  01/01/2004    right knee arthroscopy  01/01/1999    TONSILLECTOMY  child       FAMILY HISTORY:  Family History   Problem Relation Age of Onset    Cancer - colorectal Father     Heart Disease Father 40        MI    Myocardial Infarction Father     Heart Defect Mother     Heart Surgery Mother         CABG    Heart Surgery Brother         CABG    Heart Surgery Daughter 40        stent placed       SOCIAL HISTORY:  Social History     Socioeconomic History    Marital status:      Spouse name: None    Number of children: 3    Years of education: None    Highest education level: None   Occupational History    Occupation: homemaker   Tobacco Use    Smoking status: Former     Packs/day: 1.00      "Types: Cigarettes     Start date:      Quit date: 1960     Years since quittin.1    Smokeless tobacco: Never   Vaping Use    Vaping Use: Never used   Substance and Sexual Activity    Alcohol use: No     Alcohol/week: 0.0 standard drinks of alcohol    Drug use: No    Sexual activity: Not Currently   Other Topics Concern    Parent/sibling w/ CABG, MI or angioplasty before 65F 55M? Yes    Caffeine Concern No     Comment: decaf:  0-2 cups a day    Sleep Concern Yes     Comment: soemtimes takes lorazepam    Stress Concern Yes    Weight Concern Yes    Special Diet No    Exercise Yes     Comment: water aerobics x4 days a week    Seat Belt Yes       Review of Systems:  Skin:        Eyes:       ENT:       Respiratory:  Positive for shortness of breath;sleep apnea;CPAP  Cardiovascular:  Negative for;palpitations;chest pain;lightheadedness;dizziness;syncope or near-syncope edema;Positive for;fatigue  Gastroenterology:      Genitourinary:       Musculoskeletal:       Neurologic:       Psychiatric:       Heme/Lymph/Imm:       Endocrine:  Negative      Physical Exam:  Vitals: /76   Pulse 70   Ht 1.562 m (5' 1.5\")   Wt 91.6 kg (202 lb)   SpO2 96%   BMI 37.55 kg/m      Constitutional:           Skin:             Head:           Eyes:           Lymph:      ENT:           Neck:           Respiratory:            Cardiac:                                                           GI:           Extremities and Muscular Skeletal:                 Neurological:           Psych:         Recent Lab Results:  LIPID RESULTS:  Lab Results   Component Value Date    CHOL 143 2023    CHOL 165 10/04/2019    HDL 70 2023    HDL 71 10/04/2019    LDL 54 2023    LDL 69 10/04/2019    TRIG 93 2023    TRIG 127 10/04/2019    CHOLHDLRATIO 2.7 2015       LIVER ENZYME RESULTS:  Lab Results   Component Value Date    AST 15 2022    AST 11 2016    ALT 20 2023    ALT 23 10/04/2019       CBC " RESULTS:  Lab Results   Component Value Date    WBC 5.7 05/22/2023    WBC 5.5 09/14/2018    RBC 5.00 05/22/2023    RBC 4.68 09/14/2018    HGB 13.7 05/22/2023    HGB 13.2 09/14/2018    HCT 42.6 05/22/2023    HCT 40.2 09/14/2018    MCV 85 05/22/2023    MCV 86 09/14/2018    MCH 27.4 05/22/2023    MCH 28.2 09/14/2018    MCHC 32.2 05/22/2023    MCHC 32.8 09/14/2018    RDW 12.2 05/22/2023    RDW 13.0 09/14/2018     05/22/2023     09/14/2018       BMP RESULTS:  Lab Results   Component Value Date     07/25/2023     10/04/2019    POTASSIUM 4.4 07/25/2023    POTASSIUM 3.9 07/19/2022    POTASSIUM 4.0 10/04/2019    CHLORIDE 105 07/25/2023    CHLORIDE 108 07/19/2022    CHLORIDE 109 10/04/2019    CO2 28 07/25/2023    CO2 25 07/19/2022    CO2 32 10/04/2019    ANIONGAP 10 07/25/2023    ANIONGAP 8 07/19/2022    ANIONGAP 1 (L) 10/04/2019     (H) 07/25/2023     (H) 07/19/2022     (H) 10/04/2019    BUN 15.2 07/25/2023    BUN 17 07/19/2022    BUN 19 10/04/2019    CR 0.98 (H) 07/25/2023    CR 0.90 10/04/2019    GFRESTIMATED 57 (L) 07/25/2023    GFRESTIMATED 61 10/04/2019    GFRESTBLACK 70 10/04/2019    YUNIOR 9.8 07/25/2023    YUNIOR 9.4 10/04/2019        A1C RESULTS:  No results found for: A1C    INR RESULTS:  Lab Results   Component Value Date    INR 0.96 08/07/2012           CC  No referring provider defined for this encounter.

## 2023-07-28 NOTE — PROGRESS NOTES
Service Date: 07/28/2023    Tennille Lee is an 82-year-old woman.  She is also my cousin.  She has known coronary disease, moderate degree.  She had an angiogram about 11 years ago.  There was no blockage greater than 40%.  She is here for prevention primarily with blood pressure control and cholesterol control.  We reviewed her blood work.  Her BMP is normal.  Creatinine is normal.  ALT is normal.  Her lipids are at goal.  She was having left shoulder pain, which sounds to be orthopedic.  Dr. Méndez saw her and then send her to an orthopod.  She had a cortisone type injection, but it is still troublesome.  Separately, she has had some type of shortness of breath.  Etiology is unclear.  She has had some of her medical care in [place].  There is a series of CAT scans, which we reviewed today.  They do show some chronic old granulomatous disease with stable nodules, but then there was a ground-glass pattern.  On the repeat CAT scan, it was gone, so it was probably postinfectious. She did have COVID about a year or 2 ago and she thinks her shortness of breath is worse since then.  Her last echocardiogram was a couple months ago and showed normal LV, normal RV.  No evidence of pulmonary hypertension, although they could not calculate RVSP.  No significant valvular disease and her IVC was normal size, suggesting normal right atrial pressure. On the off chance that this left neck and left shoulder pain is angina because of coronary disease, I will go ahead and repeat a nuclear stress test.  She had one in 2021, so that was 2 years ago that was negative for ischemia.  We will just make sure that this is not an anginal equivalent.  The CAT scan also showed she had an old left clavicle fracture, which she was not aware of, so one wonders again if this is really orthopedic.  If she is still short of breath, I suggest she see [facility], perhaps Dr. Peace or other partners.  There were some pulmonary function tests in  the chart from 2022, but I do not think they are from [facility] and it is an incomplete test.  Again, if we do not find a clear cause for shortness of breath cardiac wise, we will see if the pulmonologist should see her.  Her exam is pertinent.  Her carotid arteries are normal with no bruits.  Lungs are clear.  Heart is regular rhythm.  We reviewed the blood test as above, the echo and the nuclear test as above.  She will call me to follow up on the results of the stress test.  Otherwise, I will see her back in 1 year.    Agus Gill MD    cc:  Shawn Méndez MD  [facility]  [address], [address]  [place]  [address]      Agus Gill MD        D: 2023   T: 2023   MT: goran    Name:     KAT MCKOY  MRN:      [id number]        Account:      [id number]   :      1940           Service Date: 2023       Document: [id number]

## 2023-07-28 NOTE — LETTER
7/28/2023    Shawn Méndez MD  [address]  [place] [address]    RE: Tennille Lee       Dear Colleague,     I had the pleasure of seeing Tennille Lee in the [facility].  HPI and Plan:       Orders Placed This Encounter   Procedures    Basic metabolic panel    Lipid Profile    Follow-Up with Cardiology     Orders Placed This Encounter   Medications    rosuvastatin (CRESTOR) 10 MG tablet     Sig: Take 10 mg by mouth daily Takes 2-10mg (20mg) tabs every other day and 1-10mg tab the other days    Magnesium Oxide 250 MG TABS     Sig: Take 250 mg by mouth daily Takes 3 times per week     Medications Discontinued During This Encounter   Medication Reason    ROSUVASTATIN CALCIUM PO Med Rec(No AVS / No eCancel)    gabapentin (NEURONTIN) 100 MG capsule Med Rec(No AVS / No eCancel)    MAGNESIUM OXIDE PO Med Rec(No AVS / No eCancel)         Encounter Diagnoses   Name Primary?    Coronary artery disease involving native coronary artery of native heart without angina pectoris Yes    Other forms of angina pectoris (H)        CURRENT MEDICATIONS:  Current Outpatient Medications   Medication Sig Dispense Refill    Coenzyme Q10 (COQ-10 PO) Take 100 mg by mouth three times a week       cyanocobalamin (VITAMIN  B-12) 1000 MCG tablet Take 1,000 mcg by mouth daily      ezetimibe (ZETIA) 10 MG tablet Take 1 tablet (10 mg) by mouth daily 90 tablet 3    Magnesium Oxide 250 MG TABS Take 250 mg by mouth daily Takes 3 times per week      omeprazole (PRILOSEC) 20 MG DR capsule Take 1 capsule (20 mg) by mouth daily 90 capsule 2    rosuvastatin (CRESTOR) 10 MG tablet Take 10 mg by mouth daily Takes 2-10mg (20mg) tabs every other day and 1-10mg tab the other days      traZODone (DESYREL) 100 MG tablet Take 100 mg by mouth At Bedtime         ALLERGIES     Allergies   Allergen Reactions    Blue Dyes (Parenteral) Other (See Comments)     Disperse Blue 106- Blue textile Dye    Myrbetriq [Mirabegron] Rash    Amoxicillin      Balsam Peru-Castor [Balsam Peru-Castor Oil]     Clindamycin Hcl     Eugenol     Influenza Virus Vaccine     Neomycin Sulfate        PAST MEDICAL HISTORY:  Past Medical History:   Diagnosis Date    Abdominal pain 06/2016    ct abd and pelvis with liver hemangiomas and renal cysts    ASCVD (arteriosclerotic cardiovascular disease) 8/12    angio done and med mgmt, done after abnl ct angio    Aragon's cyst     left    Degenerative disk disease     bulge cervicle disk    DJD (degenerative joint disease)     GERD (gastroesophageal reflux disease) 2002    egd done    Gilbert's disease     borderline elevated BR    Hemangioma of liver 9/12    ct angio with ?liver lesion, then ct of liver showed it be hemangioma, also renal cysts    Hx of colonoscopy 2010    [facility]    Hypercholesteremia     Hypertension     IFG (impaired fasting glucose)     Lumbar spinal stenosis 2018    done in [place], had mri    Obese     prior phen/fen   no valve dz    JSOE (obstructive sleep apnea) 2008    cpap    Other symptoms involving cardiovascular system     bruit    Rosacea     Thyroid nodules 2011    Dr. Guzmán--benign    TMJ (dislocation of temporomandibular joint)     left    Urine incontinence     Vertigo        PAST SURGICAL HISTORY:  Past Surgical History:   Procedure Laterality Date    carpel tunnel surgery  01/01/2011    CATARACT EXTRACTION  08/2022    CORONARY ANGIOGRAPHY ADULT ORDER  08/07/2012    moderate CAD    lid lag surgery  01/01/2004    right knee arthroscopy  01/01/1999    TONSILLECTOMY  child       FAMILY HISTORY:  Family History   Problem Relation Age of Onset    Cancer - colorectal Father     Heart Disease Father 40        MI    Myocardial Infarction Father     Heart Defect Mother     Heart Surgery Mother         CABG    Heart Surgery Brother         CABG    Heart Surgery Daughter 40        stent placed       SOCIAL HISTORY:  Social History     Socioeconomic History    Marital status:      Spouse name:  "None    Number of children: 3    Years of education: None    Highest education level: None   Occupational History    Occupation: homemaker   Tobacco Use    Smoking status: Former     Packs/day: 1.00     Types: Cigarettes     Start date:      Quit date: 1960     Years since quittin.1    Smokeless tobacco: Never   Vaping Use    Vaping Use: Never used   Substance and Sexual Activity    Alcohol use: No     Alcohol/week: 0.0 standard drinks of alcohol    Drug use: No    Sexual activity: Not Currently   Other Topics Concern    Parent/sibling w/ CABG, MI or angioplasty before 65F 55M? Yes    Caffeine Concern No     Comment: decaf:  0-2 cups a day    Sleep Concern Yes     Comment: soemtimes takes lorazepam    Stress Concern Yes    Weight Concern Yes    Special Diet No    Exercise Yes     Comment: water aerobics x4 days a week    Seat Belt Yes       Review of Systems:  Skin:        Eyes:       ENT:       Respiratory:  Positive for shortness of breath;sleep apnea;CPAP  Cardiovascular:  Negative for;palpitations;chest pain;lightheadedness;dizziness;syncope or near-syncope edema;Positive for;fatigue  Gastroenterology:      Genitourinary:       Musculoskeletal:       Neurologic:       Psychiatric:       Heme/Lymph/Imm:       Endocrine:  Negative      Physical Exam:  Vitals: /76   Pulse 70   Ht 1.562 m (5' 1.5\")   Wt 91.6 kg (202 lb)   SpO2 96%   BMI 37.55 kg/m      Constitutional:           Skin:             Head:           Eyes:           Lymph:      ENT:           Neck:           Respiratory:            Cardiac:                                                           GI:           Extremities and Muscular Skeletal:                 Neurological:           Psych:         Recent Lab Results:  LIPID RESULTS:  Lab Results   Component Value Date    CHOL 143 2023    CHOL 165 10/04/2019    HDL 70 2023    HDL 71 10/04/2019    LDL 54 2023    LDL 69 10/04/2019    TRIG 93 2023    TRIG 127 " 10/04/2019    CHOLHDLRATIO 2.7 08/25/2015       LIVER ENZYME RESULTS:  Lab Results   Component Value Date    AST 15 07/19/2022    AST 11 06/24/2016    ALT 20 07/25/2023    ALT 23 10/04/2019       CBC RESULTS:  Lab Results   Component Value Date    WBC 5.7 05/22/2023    WBC 5.5 09/14/2018    RBC 5.00 05/22/2023    RBC 4.68 09/14/2018    HGB 13.7 05/22/2023    HGB 13.2 09/14/2018    HCT 42.6 05/22/2023    HCT 40.2 09/14/2018    MCV 85 05/22/2023    MCV 86 09/14/2018    MCH 27.4 05/22/2023    MCH 28.2 09/14/2018    MCHC 32.2 05/22/2023    MCHC 32.8 09/14/2018    RDW 12.2 05/22/2023    RDW 13.0 09/14/2018     05/22/2023     09/14/2018       BMP RESULTS:  Lab Results   Component Value Date     07/25/2023     10/04/2019    POTASSIUM 4.4 07/25/2023    POTASSIUM 3.9 07/19/2022    POTASSIUM 4.0 10/04/2019    CHLORIDE 105 07/25/2023    CHLORIDE 108 07/19/2022    CHLORIDE 109 10/04/2019    CO2 28 07/25/2023    CO2 25 07/19/2022    CO2 32 10/04/2019    ANIONGAP 10 07/25/2023    ANIONGAP 8 07/19/2022    ANIONGAP 1 (L) 10/04/2019     (H) 07/25/2023     (H) 07/19/2022     (H) 10/04/2019    BUN 15.2 07/25/2023    BUN 17 07/19/2022    BUN 19 10/04/2019    CR 0.98 (H) 07/25/2023    CR 0.90 10/04/2019    GFRESTIMATED 57 (L) 07/25/2023    GFRESTIMATED 61 10/04/2019    GFRESTBLACK 70 10/04/2019    YUNIOR 9.8 07/25/2023    YUNIOR 9.4 10/04/2019        A1C RESULTS:  No results found for: A1C    INR RESULTS:  Lab Results   Component Value Date    INR 0.96 08/07/2012           CC  No referring provider defined for this encounter.    Service Date: 07/28/2023    Tennille Lee is an 82-year-old woman.  She is also my cousin.  She has known coronary disease, moderate degree.  She had an angiogram about 11 years ago.  There was no blockage greater than 40%.  She is here for prevention primarily with blood pressure control and cholesterol control.  We reviewed her blood work.  Her BMP is normal.  Creatinine  is normal.  ALT is normal.  Her lipids are at goal.  She was having left shoulder pain, which sounds to be orthopedic.  Dr. Méndez saw her and then send her to an orthopod.  She had a cortisone type injection, but it is still troublesome.  Separately, she has had some type of shortness of breath.  Etiology is unclear.  She has had some of her medical care in [place].  There is a series of CAT scans, which we reviewed today.  They do show some chronic old granulomatous disease with stable nodules, but then there was a ground-glass pattern.  On the repeat CAT scan, it was gone, so it was probably postinfectious. She did have COVID about a year or 2 ago and she thinks her shortness of breath is worse since then.  Her last echocardiogram was a couple months ago and showed normal LV, normal RV.  No evidence of pulmonary hypertension, although they could not calculate RVSP.  No significant valvular disease and her IVC was normal size, suggesting normal right atrial pressure. On the off chance that this left neck and left shoulder pain is angina because of coronary disease, I will go ahead and repeat a nuclear stress test.  She had one in 2021, so that was 2 years ago that was negative for ischemia.  We will just make sure that this is not an anginal equivalent.  The CAT scan also showed she had an old left clavicle fracture, which she was not aware of, so one wonders again if this is really orthopedic.  If she is still short of breath, I suggest she see [facility], perhaps Dr. Peace or other partners.  There were some pulmonary function tests in the chart from 08/2022, but I do not think they are from [place] and it is an incomplete test.  Again, if we do not find a clear cause for shortness of breath cardiac wise, we will see if the pulmonologist should see her.  Her exam is pertinent.  Her carotid arteries are normal with no bruits.  Lungs are clear.  Heart is regular rhythm.  We reviewed the blood test as  above, the echo and the nuclear test as above.  She will call me to follow up on the results of the stress test.  Otherwise, I will see her back in 1 year.    Agus Gill MD    cc:  Shawn Méndez MD  [facility]  [address], [address]  [address]      Agus Gill MD        D: 2023   T: 2023   MT: goran    Name:     KAT MCKOY  MRN:      [id number]        Account:      [id number]   :      1940           Service Date: 2023       Document: [id number]       Thank you for allowing me to participate in the care of your patient.      Sincerely,     Agus Gill MD     [facility]  cc:   No referring provider defined for this encounter.

## 2023-07-31 ENCOUNTER — HOSPITAL ENCOUNTER (OUTPATIENT)
Dept: CARDIOLOGY | Facility: CLINIC | Age: 83
Discharge: HOME | End: 2023-07-31
Attending: INTERNAL MEDICINE
Payer: MEDICARE

## 2023-07-31 VITALS
OXYGEN SATURATION: 94 % | BODY MASS INDEX: 37.87 KG/M2 | DIASTOLIC BLOOD PRESSURE: 66 MMHG | SYSTOLIC BLOOD PRESSURE: 108 MMHG | WEIGHT: 200.6 LBS | HEIGHT: 61 IN

## 2023-07-31 DIAGNOSIS — I25.10 CORONARY ARTERY DISEASE INVOLVING NATIVE CORONARY ARTERY OF NATIVE HEART WITHOUT ANGINA PECTORIS: ICD-10-CM

## 2023-07-31 DIAGNOSIS — I20.89 OTHER FORMS OF ANGINA PECTORIS (H): ICD-10-CM

## 2023-07-31 LAB
CV STRESS MAX HR HE: 142
NUC STRESS EJECTION FRACTION: 62 %
RATE PRESSURE PRODUCT: NORMAL
STRESS ANGINA INDEX: 0
STRESS ECHO BASELINE DIASTOLIC HE: 66
STRESS ECHO BASELINE HR: 81 BPM
STRESS ECHO BASELINE SYSTOLIC BP: 118
STRESS ECHO CALCULATED PERCENT HR: 103 %
STRESS ECHO LAST STRESS DIASTOLIC BP: 64
STRESS ECHO LAST STRESS SYSTOLIC BP: 166
STRESS ECHO POST ESTIMATED WORKLOAD: 2.3 METS
STRESS ECHO POST EXERCISE DUR MIN: 3 MIN
STRESS ECHO POST EXERCISE DUR SEC: 0 SEC
STRESS ECHO TARGET HR: 138

## 2023-07-31 PROCEDURE — 93016 CV STRESS TEST SUPVJ ONLY: CPT | Performed by: INTERNAL MEDICINE

## 2023-07-31 PROCEDURE — 78452 HT MUSCLE IMAGE SPECT MULT: CPT | Mod: 26 | Performed by: INTERNAL MEDICINE

## 2023-07-31 PROCEDURE — 93018 CV STRESS TEST I&R ONLY: CPT | Performed by: INTERNAL MEDICINE

## 2023-07-31 PROCEDURE — 93017 CV STRESS TEST TRACING ONLY: CPT

## 2023-07-31 PROCEDURE — A9502 TC99M TETROFOSMIN: HCPCS | Performed by: INTERNAL MEDICINE

## 2023-07-31 PROCEDURE — G1010 CDSM STANSON: HCPCS | Performed by: INTERNAL MEDICINE

## 2023-07-31 PROCEDURE — G1010 CDSM STANSON: HCPCS

## 2023-07-31 PROCEDURE — 343N000001 HC RX 343: Performed by: INTERNAL MEDICINE

## 2023-07-31 RX ORDER — AMINOPHYLLINE 25 MG/ML
50-100 INJECTION, SOLUTION INTRAVENOUS
Status: DISCONTINUED | OUTPATIENT
Start: 2023-07-31 | End: 2023-08-01 | Stop reason: HOSPADM

## 2023-07-31 RX ORDER — REGADENOSON 0.08 MG/ML
0.4 INJECTION, SOLUTION INTRAVENOUS ONCE
Status: DISCONTINUED | OUTPATIENT
Start: 2023-07-31 | End: 2023-08-01 | Stop reason: HOSPADM

## 2023-07-31 RX ORDER — ALBUTEROL SULFATE 90 UG/1
2 AEROSOL, METERED RESPIRATORY (INHALATION) EVERY 5 MIN PRN
Status: DISCONTINUED | OUTPATIENT
Start: 2023-07-31 | End: 2023-08-01 | Stop reason: HOSPADM

## 2023-07-31 RX ORDER — CAFFEINE CITRATE 20 MG/ML
60 SOLUTION INTRAVENOUS
Status: DISCONTINUED | OUTPATIENT
Start: 2023-07-31 | End: 2023-08-01 | Stop reason: HOSPADM

## 2023-07-31 RX ORDER — ACYCLOVIR 200 MG/1
0-1 CAPSULE ORAL
Status: DISCONTINUED | OUTPATIENT
Start: 2023-07-31 | End: 2023-08-01 | Stop reason: HOSPADM

## 2023-07-31 RX ADMIN — TETROFOSMIN 5.8 MILLICURIE: 1.38 INJECTION, POWDER, LYOPHILIZED, FOR SOLUTION INTRAVENOUS at 09:00

## 2023-07-31 RX ADMIN — TETROFOSMIN 18.74 MILLICURIE: 1.38 INJECTION, POWDER, LYOPHILIZED, FOR SOLUTION INTRAVENOUS at 10:19

## 2023-08-01 ENCOUNTER — TELEPHONE (OUTPATIENT)
Dept: CARDIOLOGY | Facility: CLINIC | Age: 83
End: 2023-08-01
Payer: MEDICARE

## 2023-08-01 NOTE — TELEPHONE ENCOUNTER
Post Office visit results:    The nuclear stress test is negative for inducible myocardial ischemia or infarction.    Left ventricular function is normal. The left ventricular ejection fraction at stress is 62%.    An exercise stress test was performed following a modified Juan protocol with the patient exercising for 3 minutes and 0 seconds without chest pain or ECG changes consistent with ischemia. Target HR was achieved.    A prior study was conducted on 10/21/2021.  This study has no change when compared with the prior study.   CECILIA Wilcox RN

## 2023-09-19 ENCOUNTER — APPOINTMENT (OUTPATIENT)
Dept: LAB | Facility: CLINIC | Age: 83
End: 2023-09-19
Payer: MEDICARE

## 2023-09-19 ENCOUNTER — OFFICE VISIT (OUTPATIENT)
Dept: FAMILY MEDICINE | Facility: CLINIC | Age: 83
End: 2023-09-19
Payer: MEDICARE

## 2023-09-19 VITALS
RESPIRATION RATE: 16 BRPM | WEIGHT: 207 LBS | HEART RATE: 75 BPM | BODY MASS INDEX: 39.08 KG/M2 | SYSTOLIC BLOOD PRESSURE: 110 MMHG | HEIGHT: 61 IN | TEMPERATURE: 98.5 F | OXYGEN SATURATION: 96 % | DIASTOLIC BLOOD PRESSURE: 76 MMHG

## 2023-09-19 DIAGNOSIS — N18.31 CHRONIC KIDNEY DISEASE, STAGE 3A (H): ICD-10-CM

## 2023-09-19 DIAGNOSIS — R73.9 HYPERGLYCEMIA: Primary | ICD-10-CM

## 2023-09-19 LAB — HBA1C MFR BLD: 5.8 % (ref 0–5.6)

## 2023-09-19 PROCEDURE — 99213 OFFICE O/P EST LOW 20 MIN: CPT | Performed by: PHYSICIAN ASSISTANT

## 2023-09-19 PROCEDURE — 36415 COLL VENOUS BLD VENIPUNCTURE: CPT | Performed by: PHYSICIAN ASSISTANT

## 2023-09-19 PROCEDURE — 83036 HEMOGLOBIN GLYCOSYLATED A1C: CPT | Performed by: PHYSICIAN ASSISTANT

## 2023-09-19 RX ORDER — GABAPENTIN 300 MG/1
CAPSULE ORAL
COMMUNITY
End: 2024-06-07

## 2023-09-19 ASSESSMENT — PAIN SCALES - GENERAL: PAINLEVEL: MODERATE PAIN (5)

## 2023-09-19 NOTE — PROGRESS NOTES
"Assessment and Plan:     (R73.9) Hyperglycemia  (primary encounter diagnosis)  Comment: had barely elevated glucose on last bmp, she would really like to be screened for DM  Plan: Hemoglobin A1c    (N18.31) Chronic kidney disease, stage 3a (H)  Comment: GFR hovers 50-60  Plan:       Fernanda Sagastume PA-C        Subjective   Tennille is a 82 year old, presenting for the following health issues:  Diabetes    HPI     Tennille is here because she would like to be screened for DM  She notes that her recent BMP revealed blood glucose of 101  She also notes she has been eating a lot of carbs and simple sugars recently    She denies recurrent infections, polydipsia  She has never had a hgbA1c to her knowledge     Review of Systems   See above      Objective    /76 (BP Location: Right arm, Patient Position: Sitting, Cuff Size: Adult Large)   Pulse 75   Temp 98.5  F (36.9  C) (Oral)   Resp 16   Ht 1.549 m (5' 1\")   Wt 93.9 kg (207 lb)   LMP  (LMP Unknown)   SpO2 96%   Breastfeeding No   BMI 39.11 kg/m    Body mass index is 39.11 kg/m .    Physical Exam     GENERAL: healthy, alert and no distress  RESP: lungs clear to auscultation - no rales, no rhonchi, no wheezes  CV: regular rates and rhythm, normal S1 S2, no S3 or S4 and no murmur, no click or rub   MS: extremities- no gross deformities noted, mild non-pitting edema bilat             "

## 2023-09-19 NOTE — RESULT ENCOUNTER NOTE
"Justin Null,    Here are your hemoglobin A1c results (3 month glucose check/diabetes screen) which put you in the \"pre-diabetic\" range.  You should watch what you eat (avoid sugar, excess carbs, etc) and exercise regularly to keep this number down.      Please let us know if you have any questions or concerns.    Regards,  Fernanda Sagastume PA-C "

## 2023-09-22 ENCOUNTER — TRANSFERRED RECORDS (OUTPATIENT)
Dept: HEALTH INFORMATION MANAGEMENT | Facility: CLINIC | Age: 83
End: 2023-09-22
Payer: MEDICARE

## 2024-03-20 ENCOUNTER — TRANSFERRED RECORDS (OUTPATIENT)
Dept: HEALTH INFORMATION MANAGEMENT | Facility: CLINIC | Age: 84
End: 2024-03-20
Payer: MEDICARE

## 2024-04-12 ENCOUNTER — TRANSFERRED RECORDS (OUTPATIENT)
Dept: HEALTH INFORMATION MANAGEMENT | Facility: CLINIC | Age: 84
End: 2024-04-12
Payer: MEDICARE

## 2024-06-02 ENCOUNTER — HEALTH MAINTENANCE LETTER (OUTPATIENT)
Age: 84
End: 2024-06-02

## 2024-06-07 ENCOUNTER — OFFICE VISIT (OUTPATIENT)
Dept: FAMILY MEDICINE | Facility: CLINIC | Age: 84
End: 2024-06-07
Payer: MEDICARE

## 2024-06-07 VITALS
DIASTOLIC BLOOD PRESSURE: 75 MMHG | BODY MASS INDEX: 37.91 KG/M2 | WEIGHT: 200.8 LBS | HEIGHT: 61 IN | HEART RATE: 76 BPM | RESPIRATION RATE: 18 BRPM | OXYGEN SATURATION: 96 % | SYSTOLIC BLOOD PRESSURE: 110 MMHG | TEMPERATURE: 98.2 F

## 2024-06-07 DIAGNOSIS — E78.5 HYPERLIPIDEMIA LDL GOAL <130: ICD-10-CM

## 2024-06-07 DIAGNOSIS — Z78.0 ASYMPTOMATIC MENOPAUSAL STATE: ICD-10-CM

## 2024-06-07 DIAGNOSIS — G47.33 OSA (OBSTRUCTIVE SLEEP APNEA): ICD-10-CM

## 2024-06-07 DIAGNOSIS — N18.31 CHRONIC KIDNEY DISEASE, STAGE 3A (H): ICD-10-CM

## 2024-06-07 DIAGNOSIS — I25.10 ASCVD (ARTERIOSCLEROTIC CARDIOVASCULAR DISEASE): ICD-10-CM

## 2024-06-07 DIAGNOSIS — R26.89 BALANCE PROBLEMS: ICD-10-CM

## 2024-06-07 DIAGNOSIS — R73.01 IFG (IMPAIRED FASTING GLUCOSE): ICD-10-CM

## 2024-06-07 DIAGNOSIS — E66.01 MORBID OBESITY (H): ICD-10-CM

## 2024-06-07 DIAGNOSIS — I10 PRIMARY HYPERTENSION: ICD-10-CM

## 2024-06-07 DIAGNOSIS — R25.2 LEG CRAMP: ICD-10-CM

## 2024-06-07 DIAGNOSIS — Z00.00 ENCOUNTER FOR MEDICARE ANNUAL WELLNESS EXAM: Primary | ICD-10-CM

## 2024-06-07 LAB
ALBUMIN SERPL BCG-MCNC: 4.3 G/DL (ref 3.5–5.2)
ALP SERPL-CCNC: 71 U/L (ref 40–150)
ALT SERPL W P-5'-P-CCNC: 18 U/L (ref 0–50)
ANION GAP SERPL CALCULATED.3IONS-SCNC: 11 MMOL/L (ref 7–15)
AST SERPL W P-5'-P-CCNC: 20 U/L (ref 0–45)
BILIRUB SERPL-MCNC: 1.2 MG/DL
BUN SERPL-MCNC: 13.1 MG/DL (ref 8–23)
CALCIUM SERPL-MCNC: 9.9 MG/DL (ref 8.8–10.2)
CHLORIDE SERPL-SCNC: 106 MMOL/L (ref 98–107)
CHOLEST SERPL-MCNC: 142 MG/DL
CREAT SERPL-MCNC: 1.04 MG/DL (ref 0.51–0.95)
DEPRECATED HCO3 PLAS-SCNC: 26 MMOL/L (ref 22–29)
EGFRCR SERPLBLD CKD-EPI 2021: 53 ML/MIN/1.73M2
ERYTHROCYTE [DISTWIDTH] IN BLOOD BY AUTOMATED COUNT: 12.6 % (ref 10–15)
FASTING STATUS PATIENT QL REPORTED: YES
FASTING STATUS PATIENT QL REPORTED: YES
FOLATE SERPL-MCNC: 11.6 NG/ML (ref 4.6–34.8)
GLUCOSE SERPL-MCNC: 103 MG/DL (ref 70–99)
HBA1C MFR BLD: 5.8 % (ref 0–5.6)
HCT VFR BLD AUTO: 43.4 % (ref 35–47)
HDLC SERPL-MCNC: 75 MG/DL
HGB BLD-MCNC: 14 G/DL (ref 11.7–15.7)
LDLC SERPL CALC-MCNC: 50 MG/DL
MCH RBC QN AUTO: 27.7 PG (ref 26.5–33)
MCHC RBC AUTO-ENTMCNC: 32.3 G/DL (ref 31.5–36.5)
MCV RBC AUTO: 86 FL (ref 78–100)
NONHDLC SERPL-MCNC: 67 MG/DL
PLATELET # BLD AUTO: 185 10E3/UL (ref 150–450)
POTASSIUM SERPL-SCNC: 4.3 MMOL/L (ref 3.4–5.3)
PROT SERPL-MCNC: 6.9 G/DL (ref 6.4–8.3)
RBC # BLD AUTO: 5.06 10E6/UL (ref 3.8–5.2)
SODIUM SERPL-SCNC: 143 MMOL/L (ref 135–145)
TRIGL SERPL-MCNC: 85 MG/DL
TSH SERPL DL<=0.005 MIU/L-ACNC: 2.63 UIU/ML (ref 0.3–4.2)
VIT B12 SERPL-MCNC: 1085 PG/ML (ref 232–1245)
WBC # BLD AUTO: 4.3 10E3/UL (ref 4–11)

## 2024-06-07 PROCEDURE — 85027 COMPLETE CBC AUTOMATED: CPT | Performed by: INTERNAL MEDICINE

## 2024-06-07 PROCEDURE — 82570 ASSAY OF URINE CREATININE: CPT | Performed by: INTERNAL MEDICINE

## 2024-06-07 PROCEDURE — G0439 PPPS, SUBSEQ VISIT: HCPCS | Performed by: INTERNAL MEDICINE

## 2024-06-07 PROCEDURE — 82607 VITAMIN B-12: CPT | Performed by: INTERNAL MEDICINE

## 2024-06-07 PROCEDURE — 82746 ASSAY OF FOLIC ACID SERUM: CPT | Performed by: INTERNAL MEDICINE

## 2024-06-07 PROCEDURE — 99214 OFFICE O/P EST MOD 30 MIN: CPT | Mod: 25 | Performed by: INTERNAL MEDICINE

## 2024-06-07 PROCEDURE — 83036 HEMOGLOBIN GLYCOSYLATED A1C: CPT | Performed by: INTERNAL MEDICINE

## 2024-06-07 PROCEDURE — 80053 COMPREHEN METABOLIC PANEL: CPT | Performed by: INTERNAL MEDICINE

## 2024-06-07 PROCEDURE — 82043 UR ALBUMIN QUANTITATIVE: CPT | Performed by: INTERNAL MEDICINE

## 2024-06-07 PROCEDURE — 84443 ASSAY THYROID STIM HORMONE: CPT | Performed by: INTERNAL MEDICINE

## 2024-06-07 PROCEDURE — 80061 LIPID PANEL: CPT | Performed by: INTERNAL MEDICINE

## 2024-06-07 PROCEDURE — 36415 COLL VENOUS BLD VENIPUNCTURE: CPT | Performed by: INTERNAL MEDICINE

## 2024-06-07 SDOH — HEALTH STABILITY: PHYSICAL HEALTH: ON AVERAGE, HOW MANY DAYS PER WEEK DO YOU ENGAGE IN MODERATE TO STRENUOUS EXERCISE (LIKE A BRISK WALK)?: 3 DAYS

## 2024-06-07 ASSESSMENT — SOCIAL DETERMINANTS OF HEALTH (SDOH): HOW OFTEN DO YOU GET TOGETHER WITH FRIENDS OR RELATIVES?: MORE THAN THREE TIMES A WEEK

## 2024-06-07 ASSESSMENT — PAIN SCALES - GENERAL: PAINLEVEL: NO PAIN (0)

## 2024-06-07 NOTE — PATIENT INSTRUCTIONS
"Stop the rosuvastatin as I suspect this might be causing the leg cramping.  It can take up to 4 weeks for it to go away.  Please send me an update on mychart in 4 weeks and let me know if you still have it.    I would get another covid shot at your pharmacy now.    I would get the rsv shot in the fall at your pharmacy.    Preventive Care Advice   This is general advice we often give to help people stay healthy. Your care team may have specific advice just for you. Please talk to your care team about your own preventive care needs.  Lifestyle  Exercise at least 150 minutes each week (30 minutes a day, 5 days a week).  Do muscle strengthening activities 2 days a week. These help control your weight and prevent disease.  No smoking.  Wear sunscreen to prevent skin cancer.  Have your home tested for radon every 2 to 5 years. Radon is a colorless, odorless gas that can harm your lungs. To learn more, go to www.health.[facility].[place]. and search for \"Radon in Homes.\"  Keep guns unloaded and locked up in a safe place like a safe or gun vault, or, use a gun lock and hide the keys. Always lock away bullets separately. To learn more, visit [employer]ed[employer].com.[place].gov and search for \"safe gun storage.\"  Nutrition  Eat 5 or more servings of fruits and vegetables each day.  Try wheat bread, brown rice and whole grain pasta (instead of white bread, rice, and pasta).  Get enough calcium and vitamin D. Check the label on foods and aim for 100% of the RDA (recommended daily allowance).  Regular exams  Have a dental exam and cleaning every 6 months.  See your health care team every year to talk about:  Any changes in your health.  Any medicines your care team has prescribed.  Preventive care, family planning, and ways to prevent chronic diseases.  Shots (vaccines)   HPV shots (up to age 26), if you've never had them before.  Hepatitis B shots (up to age 59), if you've never had them before.  COVID-19 shot: Get this shot when it's due.  Flu shot: Get a flu " shot every year.  Tetanus shot: Get a tetanus shot every 10 years.  Pneumococcal, hepatitis A, and RSV shots: Ask your care team if you need these based on your risk.  Shingles shot (for age 50 and up).  General health tests  Diabetes screening:  Starting at age 35, Get screened for diabetes at least every 3 years.  If you are younger than age 35, ask your care team if you should be screened for diabetes.  Cholesterol test: At age 39, start having a cholesterol test every 5 years, or more often if advised.  Bone density scan (DEXA): At age 50, ask your care team if you should have this scan for osteoporosis (brittle bones).  Hepatitis C: Get tested at least once in your life.  Abdominal aortic aneurysm screening: Talk to your doctor about having this screening if you:  Have ever smoked; and  Are biologically male; and  Are between the ages of 65 and 75.  STIs (sexually transmitted infections)  Before age 24: Ask your care team if you should be screened for STIs.  After age 24: Get screened for STIs if you're at risk. You are at risk for STIs (including HIV) if:  You are sexually active with more than one person.  You don't use condoms every time.  You or a partner was diagnosed with a sexually transmitted infection.  If you are at risk for HIV, ask about PrEP medicine to prevent HIV.  Get tested for HIV at least once in your life, whether you are at risk for HIV or not.  Cancer screening tests  Cervical cancer screening: If you have a cervix, begin getting regular cervical cancer screening tests at age 21. Most people who have regular screenings with normal results can stop after age 65. Talk about this with your provider.  Breast cancer scan (mammogram): If you've ever had breasts, begin having regular mammograms starting at age 40. This is a scan to check for breast cancer.  Colon cancer screening: It is important to start screening for colon cancer at age 45.  Have a colonoscopy test every 10 years (or more often  if you're at risk) Or, ask your provider about stool tests like a FIT test every year or Cologuard test every 3 years.  To learn more about your testing options, visit: www.[employer]â„¢ Medical/946589.pdf.  For help making a decision, visit: rafa/tk65561.  Prostate cancer screening test: If you have a prostate and are age 55 to 69, ask your provider if you would benefit from a yearly prostate cancer screening test.  Lung cancer screening: If you are a current or former smoker age 50 to 80, ask your care team if ongoing lung cancer screenings are right for you.  For informational purposes only. Not to replace the advice of your health care provider. Copyright   2023 [employer]. All rights reserved. Clinically reviewed by the [facility] Transitions Program. Talkwheel [id number] - REV 04/24.    Preventing Falls: Care Instructions  Injuries and health problems such as trouble walking or poor eyesight can increase your risk of falling. So can some medicines. But there are things you can do to help prevent falls. You can exercise to get stronger. You can also arrange your home to make it safer.    Talk to your doctor about the medicines you take. Ask if any of them increase the risk of falls and whether they can be changed or stopped.   Try to exercise regularly. It can help improve your strength and balance. This can help lower your risk of falling.     Practice fall safety and prevention.    Wear low-heeled shoes that fit well and give your feet good support. Talk to your doctor if you have foot problems that make this hard.  Carry a cellphone or wear a medical alert device that you can use to call for help.  Use stepladders instead of chairs to reach high objects. Don't climb if you're at risk for falls. Ask for help, if needed.  Wear the correct eyeglasses, if you need them.    Make your home safer.    Remove rugs, cords, clutter, and furniture from walkways.  Keep your house well lit. Use night-lights in  "hallways and bathrooms.  Install and use sturdy handrails on stairways.  Wear nonskid footwear, even inside. Don't walk barefoot or in socks without shoes.    Be safe outside.    Use handrails, curb cuts, and ramps whenever possible.  Keep your hands free by using a shoulder bag or backpack.  Try to walk in well-lit areas. Watch out for uneven ground, changes in pavement, and debris.  Be careful in the winter. Walk on the grass or gravel when sidewalks are slippery. Use de-icer on steps and walkways. Add non-slip devices to shoes.    Put grab bars and nonskid mats in your shower or tub and near the toilet. Try to use a shower chair or bath bench when bathing.   Get into a tub or shower by putting in your weaker leg first. Get out with your strong side first. Have a phone or medical alert device in the bathroom with you.   Where can you learn more?  Go to https://www.[employer].net/patiented  Enter G117 in the search box to learn more about \"Preventing Falls: Care Instructions.\"  Current as of: July 17, 2023               Content Version: 14.0    9010-9436 [employer].   Care instructions adapted under license by your healthcare professional. If you have questions about a medical condition or this instruction, always ask your healthcare professional. [employer] disclaims any warranty or liability for your use of this information.      Bladder Training: Care Instructions  Your Care Instructions     Bladder training is used to treat urge incontinence and stress incontinence. Urge incontinence means that the need to urinate comes on so fast that you can't get to a toilet in time. Stress incontinence means that you leak urine because of pressure on your bladder. For example, it may happen when you laugh, cough, or lift something heavy.  Bladder training can increase how long you can wait before you have to urinate. It can also help your bladder hold more urine. And it can give you better " control over the urge to urinate.  It is important to remember that bladder training takes a few weeks to a few months to make a difference. You may not see results right away, but don't give up.  Follow-up care is a key part of your treatment and safety. Be sure to make and go to all appointments, and call your doctor if you are having problems. It's also a good idea to know your test results and keep a list of the medicines you take.  How can you care for yourself at home?  Work with your doctor to come up with a bladder training program that is right for you. You may use one or more of the following methods.  Delayed urination  In the beginning, try to keep from urinating for 5 minutes after you first feel the need to go.  While you wait, take deep, slow breaths to relax. Kegel exercises can also help you delay the need to go to the bathroom.  After some practice, when you can easily wait 5 minutes to urinate, try to wait 10 minutes before you urinate.  Slowly increase the waiting period until you are able to control when you have to urinate.  Scheduled urination  Empty your bladder when you first wake up in the morning.  Schedule times throughout the day when you will urinate.  Start by going to the bathroom every hour, even if you don't need to go.  Slowly increase the time between trips to the bathroom.  When you have found a schedule that works well for you, keep doing it.  If you wake up during the night and have to urinate, do it. Apply your schedule to waking hours only.  Kegel exercises  These tighten and strengthen pelvic muscles, which can help you control the flow of urine. (If doing these exercises causes pain, stop doing them and talk with your doctor.) To do Kegel exercises:  Squeeze your muscles as if you were trying not to pass gas. Or squeeze your muscles as if you were stopping the flow of urine. Your belly, legs, and buttocks shouldn't move.  Hold the squeeze for 3 seconds, then relax for 5 to  "10 seconds.  Start with 3 seconds, then add 1 second each week until you are able to squeeze for 10 seconds.  Repeat the exercise 10 times a session. Do 3 to 8 sessions a day.  When should you call for help?  Watch closely for changes in your health, and be sure to contact your doctor if:    Your incontinence is getting worse.     You do not get better as expected.   Where can you learn more?  Go to https://www.[employer]Ti.net/patiented  Enter V684 in the search box to learn more about \"Bladder Training: Care Instructions.\"  Current as of: November 15, 2023               Content Version: 14.0    8785-3683 [employer].   Care instructions adapted under license by your healthcare professional. If you have questions about a medical condition or this instruction, always ask your healthcare professional. [employer] disclaims any warranty or liability for your use of this information.      "

## 2024-06-07 NOTE — PROGRESS NOTES
Preventive Care Visit  [facility] LOREE Méndez MD, Internal Medicine  Jun 7, 2024          Justina Null is a 83 year old, presenting for the following:    Overall she is doing well but has a few issues.  Her weight remains an issue.  She has CKD.  I will get blood and urine today.  She has presumed coronary disease without chest pain or shortness of breath.  Her blood pressure is quite good.    She has had nocturnal leg cramps for over a month.  Just at night.  Nothing on the day.  It comes and goes but is most nights.  Is just on her left side.    She has noticed balance problems for a year or 2.  She is not falling.  She is not dizzy.  She just feels unsteady.    She is otherwise doing well and does exercise.  She spends the winter in [place].  Her  is 89 and that sounds like [email or web address] is not great.               Past Medical History:      Past Medical History:   Diagnosis Date    Abdominal pain 06/2016    ct abd and pelvis with liver hemangiomas and renal cysts    ASCVD (arteriosclerotic cardiovascular disease) 08/2012    angio done and med mgmt, done after abnl ct angio    Aragon's cyst     left    Degenerative disk disease     bulge cervicle disk    DJD (degenerative joint disease)     GERD (gastroesophageal reflux disease) 2002    egd done    Gilbert's disease     borderline elevated BR    Hemangioma of liver 09/2012    ct angio with ?liver lesion, then ct of liver showed it be hemangioma, also renal cysts    Hx of colonoscopy 2010    [facility] [place]    Hypercholesteremia     Hypertension     IFG (impaired fasting glucose)     Lumbar spinal stenosis 2018    done in [place], had mri    Obese     prior phen/fen   no valve dz    JOSE (obstructive sleep apnea) 2008    cpap    Other symptoms involving cardiovascular system     bruit    Rosacea     Thyroid nodules 2011    Dr. Guzmán--benign    TMJ (dislocation of temporomandibular joint)     left    Urine incontinence      Vertigo              Past Surgical History:      Past Surgical History:   Procedure Laterality Date    carpel tunnel surgery  2011    CATARACT EXTRACTION  2022    cataract surgery  2023    CORONARY ANGIOGRAPHY ADULT ORDER  2012    moderate CAD    lid lag surgery  2004    right knee arthroscopy  1999    TONSILLECTOMY  child             Social History:     Social History     Socioeconomic History    Marital status:      Spouse name: Not on file    Number of children: 3    Years of education: Not on file    Highest education level: Not on file   Occupational History    Occupation: homemaker   Tobacco Use    Smoking status: Former     Current packs/day: 0.00     Average packs/day: 1 pack/day for 5.4 years (5.4 ttl pk-yrs)     Types: Cigarettes     Start date:      Quit date: 1960     Years since quittin.0    Smokeless tobacco: Never   Vaping Use    Vaping status: Never Used   Substance and Sexual Activity    Alcohol use: No     Alcohol/week: 0.0 standard drinks of alcohol    Drug use: No    Sexual activity: Not Currently   Other Topics Concern    Parent/sibling w/ CABG, MI or angioplasty before 65F 55M? Yes     Service Not Asked    Blood Transfusions Not Asked    Caffeine Concern No     Comment: decaf:  0-2 cups a day    Occupational Exposure Not Asked    Hobby Hazards Not Asked    Sleep Concern Yes     Comment: soemtimes takes lorazepam    Stress Concern Yes    Weight Concern Yes    Special Diet No    Back Care Not Asked    Exercise Yes     Comment: water aerobics x4 days a week    Bike Helmet Not Asked    Seat Belt Yes    Self-Exams Not Asked   Social History Narrative    Not on file     Social Determinants of Health     Financial Resource Strain: Low Risk  (2024)    Financial Resource Strain     Within the past 12 months, have you or your family members you live with been unable to get utilities (heat, electricity) when it was really needed?: No   Food  Insecurity: Low Risk  (6/7/2024)    Food Insecurity     Within the past 12 months, did you worry that your food would run out before you got money to buy more?: No     Within the past 12 months, did the food you bought just not last and you didn t have money to get more?: No   Transportation Needs: Low Risk  (6/7/2024)    Transportation Needs     Within the past 12 months, has lack of transportation kept you from medical appointments, getting your medicines, non-medical meetings or appointments, work, or from getting things that you need?: No   Physical Activity: Unknown (6/7/2024)    Exercise Vital Sign     Days of Exercise per Week: 3 days     Minutes of Exercise per Session: Not on file   Stress: No Stress Concern Present (6/7/2024)    [employer] [place] - Occupational Stress Questionnaire     Feeling of Stress : Not at all   Social Connections: Unknown (6/7/2024)    Social Connection and Isolation Panel [NHANES]     Frequency of Communication with Friends and Family: Not on file     Frequency of Social Gatherings with Friends and Family: More than three times a week     Attends Nondenominational Services: Not on file     Active Member of Clubs or Organizations: Not on file     Attends Club or Organization Meetings: Not on file     Marital Status: Not on file   Interpersonal Safety: Low Risk  (6/7/2024)    Interpersonal Safety     Do you feel physically and emotionally safe where you currently live?: Yes     Within the past 12 months, have you been hit, slapped, kicked or otherwise physically hurt by someone?: No     Within the past 12 months, have you been humiliated or emotionally abused in other ways by your partner or ex-partner?: No   Housing Stability: Low Risk  (6/7/2024)    Housing Stability     Do you have housing? : Yes     Are you worried about losing your housing?: No             Family History:   reviewed         Allergies:     Allergies   Allergen Reactions    Eszopiclone Muscle Pain  "(Myalgia)    Nickel      Positive on patch test    Blue Dyes (Parenteral) Other (See Comments)     Disperse Blue 106- Blue textile Dye    Lorazepam      Other Reaction(s): Leg cramps at night    Amoxicillin     Balsam Unknown    Balsam [place]-Castor [Balsam [place]-Castor Oil]     Cephalexin Diarrhea    Clindamycin Hcl     Conj Estrog-Medroxyprogest Ace Other (See Comments)     Leg aching and cramps    Erythromycin     Eugenol     Influenza Virus Vaccine     Neomycin Unknown    Neomycin Sulfate     Nizatidine Other (See Comments)     Elevated liver function tests    Raloxifene Other (See Comments)     Exacerbatoin of hot flashes             Medications:     Current Outpatient Medications   Medication Sig Dispense Refill    artificial tears OINT ophthalmic ointment At Bedtime      Coenzyme Q10 (COQ-10 PO) Take 100 mg by mouth three times a week       cyanocobalamin (VITAMIN  B-12) 1000 MCG tablet Take 1,000 mcg by mouth daily      ezetimibe (ZETIA) 10 MG tablet Take 1 tablet (10 mg) by mouth daily 90 tablet 3    Magnesium Oxide 250 MG TABS Take 250 mg by mouth daily Takes 3 times per week      omeprazole (PRILOSEC) 20 MG DR capsule Take 1 capsule (20 mg) by mouth daily 90 capsule 2    rosuvastatin (CRESTOR) 10 MG tablet Take 10 mg by mouth daily Takes 2-10mg (20mg) tabs every other day and 1-10mg tab the other days      traZODone (DESYREL) 100 MG tablet Take 100 mg by mouth At Bedtime                 Review of Systems:     The 10 point Review of Systems is negative other than noted in the HPI           Physical Exam:   Blood pressure 110/75, pulse 76, temperature 98.2  F (36.8  C), temperature source Temporal, resp. rate 18, height 1.549 m (5' 1\"), weight 91.1 kg (200 lb 12.8 oz), SpO2 96%, not currently breastfeeding.    Exam:  Constitutional: healthy appearing, alert and in no distress  Heent: Normocephalic. Head without obvious masses or lesions. PERRLDC, EOMI. Mouth exam within normal limits: tongue, mucous membranes, " posterior pharynx all normal, no lesions or abnormalities seen.  Tm's and canals within normal limits bilaterally. Neck supple, no nuchal rigidity or masses. No supraclavicular, or cervical adenopathy. Thyroid symmetric, no masses.  Cardiovascular: Regular rate and rhythm, no murmer, rub or gallops.  JVP not elevated, no edema.  Carotids within normal limits bilaterally, no bruits.  Respiratory: Normal respiratory effort.  Lungs clear, normal flow, no wheezing or crackles.  Gastrointestinal: Normal active bowel sounds.   Soft, not tender, no masses, guarding or rebound.  No hepatosplenomegaly.   Musculoskeletal: extremities normal, no gross deformities noted.  Skin: no suspicious lesions or rashes   Neurologic: Mental status within normal limits.  Speech fluent.  No gross motor abnormalities and gait intact.  Psychiatric: mentation appears normal and affect normal.  Examination of her feet are unremarkable for any sores or lesions.  Decreased pulses.  Normal capillary refill and tone         Data:   Labs sent        Assessment:   Normal complete physical exam  Morbid obesity, weight loss recommended  CKD, blood pressure fine, follow-up labs  Leg cramps, suspect statin, doubt peripheral vascular disease, clot, vascular, tumor  Balance issues, chronic, check labs and consider neuro referral  Coronary artery disease, med management  Hypertension, controlled  Hyperlipidemia, on statin therapy but will need to try off of it  Sleep apnea, CPAP  Elevated sugar, labs  Healthcare maintenance         Plan:   Vaccines at pharmacy, she tells me she had Shingrix in [place]  Letter with labs  Stop statin and send update in 4 weeks  Labs and if normal to neurology  Exercise and diet      Shawn Méndez M.D.            Physical (fasting) and LAB REQUEST (UA collected)        6/7/2024     9:36 AM   Additional Questions   Roomed by [facility] Care Directive  Patient does not have a Health Care Directive or Living Will:  Discussed advance care planning with patient; information given to patient to review.    HPI              6/7/2024   General Health   How would you rate your overall physical health? Good   Feel stress (tense, anxious, or unable to sleep) Not at all         6/7/2024   Nutrition   Diet: Regular (no restrictions)         6/7/2024   Exercise   Days per week of moderate/strenous exercise 3 days         6/7/2024   Social Factors   Frequency of gathering with friends or relatives More than three times a week   Worry food won't last until get money to buy more No   Food not last or not have enough money for food? No   Do you have housing?  Yes   Are you worried about losing your housing? No   Lack of transportation? No   Unable to get utilities (heat,electricity)? No         6/7/2024   Fall Risk   Fallen 2 or more times in the past year? No    No   Trouble with walking or balance? Yes    Yes   Gait Speed Test (Document in seconds) 7.44   Gait Speed Test Interpretation Greater than 5.01 seconds - ABNORMAL          6/7/2024   Activities of Daily Living- Home Safety   Needs help with the following daily activites None of the above   Safety concerns in the home None of the above         6/7/2024   Dental   Dentist two times every year? Yes         6/7/2024   Hearing Screening   Hearing concerns? None of the above         6/7/2024   Driving Risk Screening   Patient/family members have concerns about driving No         6/7/2024   General Alertness/Fatigue Screening   Have you been more tired than usual lately? No         6/7/2024   Urinary Incontinence Screening   Bothered by leaking urine in past 6 months Yes         6/7/2024   TB Screening   Were you born outside of the [place]? No         Today's PHQ-2 Score:       6/7/2024     9:31 AM   PHQ-2 ( 1999 Pf[employer])   Q1: Little interest or pleasure in doing things 0   Q2: Feeling down, depressed or hopeless 0   PHQ-2 Score 0   Q1: Little interest or pleasure in doing things Not at all    Q2: Feeling down, depressed or hopeless Not at all   PHQ-2 Score 0           2024   Substance Use   Alcohol more than 3/day or more than 7/wk Not Applicable   Do you have a current opioid prescription? No   How severe/bad is pain from 1 to 10? 8/10   Do you use any other substances recreationally? No     Social History     Tobacco Use    Smoking status: Former     Current packs/day: 0.00     Average packs/day: 1 pack/day for 5.4 years (5.4 ttl pk-yrs)     Types: Cigarettes     Start date:      Quit date: 1960     Years since quittin.0    Smokeless tobacco: Never   Vaping Use    Vaping status: Never Used   Substance Use Topics    Alcohol use: No     Alcohol/week: 0.0 standard drinks of alcohol    Drug use: No                        Reviewed and updated as needed this visit by Provider                      Current providers sharing in care for this patient include:  Patient Care Team:  Shawn Méndez MD as PCP - General (Internal Medicine)  Agus Gill MD as MD (Cardiovascular Disease)  Shawn Méndez MD as Assigned PCP  Agus Gill MD as Assigned Heart and Vascular Provider    The following health maintenance items are reviewed in Epic and correct as of today:  Health Maintenance   Topic Date Due    DEXA  Never done    MICROALBUMIN  Never done    ANNUAL REVIEW OF HM ORDERS  Never done    RSV VACCINE (Pregnancy & 60+) (1 - 1-dose 60+ series) Never done    ZOSTER IMMUNIZATION (2 of 3) 2007    MEDICARE ANNUAL WELLNESS VISIT  2020    COVID-19 Vaccine (3 - 2023-24 season) 2023    HEMOGLOBIN  2024    BMP  2024    LIPID  2024    INFLUENZA VACCINE (Season Ended) 2024    FALL RISK ASSESSMENT  2025    ADVANCE CARE PLANNING  2029    DTAP/TDAP/TD IMMUNIZATION (3 - Td or Tdap) 2029    PHQ-2 (once per calendar year)  Completed    Pneumococcal Vaccine: 65+ Years  Completed    URINALYSIS  Completed    IPV IMMUNIZATION  Aged Out  "   HPV IMMUNIZATION  Aged Out    MENINGITIS IMMUNIZATION  Aged Out    RSV MONOCLONAL ANTIBODY  Aged Out            Objective    Exam  /75 (BP Location: Left arm, Cuff Size: Adult Large)   Pulse 76   Temp 98.2  F (36.8  C) (Temporal)   Resp 18   Ht 1.549 m (5' 1\")   Wt 91.1 kg (200 lb 12.8 oz)   LMP  (LMP Unknown)   SpO2 96%   BMI 37.94 kg/m     Estimated body mass index is 37.94 kg/m  as calculated from the following:    Height as of this encounter: 1.549 m (5' 1\").    Weight as of this encounter: 91.1 kg (200 lb 12.8 oz).    Physical Exam           6/7/2024   Mini Cog   Clock Draw Score 0 Abnormal   3 Item Recall 3 objects recalled   Mini Cog Total Score 3              Signed Electronically by: Shawn Méndez MD    "

## 2024-06-08 LAB
CREAT UR-MCNC: 185 MG/DL
MICROALBUMIN UR-MCNC: <12 MG/L
MICROALBUMIN/CREAT UR: NORMAL MG/G{CREAT}

## 2024-06-09 NOTE — RESULT ENCOUNTER NOTE
Mrs. Lee,    It was very nice seeing you.  You should be able to view all of your test results.    Your CBC or blood count is normal with no signs of anemia or blood disorders.  Your chemistry panel shows a slightly elevated blood sugar.  A second test for diabetes called the A1c is also just barely above normal.  You do not have diabetes that may be at higher risk of developing that so it is important to exercise and try to get your weight down for this.    Your total cholesterol is very good at 142.  Your HDL or good cholesterol and LDL or bad cholesterol are super as well.    A few additional normal tests include your vitamin B12 level, thyroid test, folic acid level, and urine.    I am happy to bring you this overall excellent report.  Please let me know if you have questions.    Shawn

## 2024-06-26 ENCOUNTER — HOSPITAL ENCOUNTER (OUTPATIENT)
Dept: BONE DENSITY | Facility: CLINIC | Age: 84
Discharge: HOME | End: 2024-06-26
Attending: INTERNAL MEDICINE | Admitting: INTERNAL MEDICINE
Payer: MEDICARE

## 2024-06-26 DIAGNOSIS — Z78.0 ASYMPTOMATIC MENOPAUSAL STATE: ICD-10-CM

## 2024-06-26 DIAGNOSIS — Z00.00 ENCOUNTER FOR MEDICARE ANNUAL WELLNESS EXAM: ICD-10-CM

## 2024-06-26 PROCEDURE — 77080 DXA BONE DENSITY AXIAL: CPT

## 2024-06-27 PROBLEM — M81.0: Status: ACTIVE | Noted: 2024-06-01

## 2024-06-27 NOTE — RESULT ENCOUNTER NOTE
Rosa,    Thank you for doing the bone density testing.  You do have osteoporosis.  It is not severe but it is something we should talk about further and consider prescription medication for.  I would like you to schedule a virtual visit with me sometime in the next few weeks to go over this.  If you have any trouble scheduling that just let me know.    Shawn

## 2024-07-02 ENCOUNTER — TRANSFERRED RECORDS (OUTPATIENT)
Dept: HEALTH INFORMATION MANAGEMENT | Facility: CLINIC | Age: 84
End: 2024-07-02
Payer: MEDICARE

## 2024-07-09 ENCOUNTER — VIRTUAL VISIT (OUTPATIENT)
Dept: FAMILY MEDICINE | Facility: CLINIC | Age: 84
End: 2024-07-09
Payer: MEDICARE

## 2024-07-09 DIAGNOSIS — K21.9 GASTROESOPHAGEAL REFLUX DISEASE, UNSPECIFIED WHETHER ESOPHAGITIS PRESENT: ICD-10-CM

## 2024-07-09 DIAGNOSIS — M81.0 AGE-RELATED OSTEOPOROSIS WITHOUT CURRENT PATHOLOGICAL FRACTURE: Primary | ICD-10-CM

## 2024-07-09 DIAGNOSIS — E78.5 HYPERLIPIDEMIA LDL GOAL <130: ICD-10-CM

## 2024-07-09 PROCEDURE — 99214 OFFICE O/P EST MOD 30 MIN: CPT | Mod: 95 | Performed by: INTERNAL MEDICINE

## 2024-07-09 RX ORDER — HEPARIN SODIUM (PORCINE) LOCK FLUSH IV SOLN 100 UNIT/ML 100 UNIT/ML
5 SOLUTION INTRAVENOUS
Status: CANCELLED | OUTPATIENT
Start: 2024-07-16

## 2024-07-09 RX ORDER — ALBUTEROL SULFATE 0.83 MG/ML
2.5 SOLUTION RESPIRATORY (INHALATION)
Status: CANCELLED | OUTPATIENT
Start: 2024-07-16

## 2024-07-09 RX ORDER — METHYLPREDNISOLONE SODIUM SUCCINATE 125 MG/2ML
125 INJECTION, POWDER, LYOPHILIZED, FOR SOLUTION INTRAMUSCULAR; INTRAVENOUS
Status: CANCELLED
Start: 2024-07-16

## 2024-07-09 RX ORDER — ALBUTEROL SULFATE 90 UG/1
1-2 AEROSOL, METERED RESPIRATORY (INHALATION)
Status: CANCELLED
Start: 2024-07-16

## 2024-07-09 RX ORDER — EPINEPHRINE 1 MG/ML
0.3 INJECTION, SOLUTION, CONCENTRATE INTRAVENOUS EVERY 5 MIN PRN
Status: CANCELLED | OUTPATIENT
Start: 2024-07-16

## 2024-07-09 RX ORDER — ROSUVASTATIN CALCIUM 20 MG/1
20 TABLET, COATED ORAL EVERY OTHER DAY
Qty: 45 TABLET | Refills: 3 | Status: SHIPPED
Start: 2024-07-09

## 2024-07-09 RX ORDER — MEPERIDINE HYDROCHLORIDE 25 MG/ML
25 INJECTION INTRAMUSCULAR; INTRAVENOUS; SUBCUTANEOUS EVERY 30 MIN PRN
Status: CANCELLED | OUTPATIENT
Start: 2024-07-16

## 2024-07-09 RX ORDER — DIPHENHYDRAMINE HYDROCHLORIDE 50 MG/ML
50 INJECTION INTRAMUSCULAR; INTRAVENOUS
Status: CANCELLED
Start: 2024-07-16

## 2024-07-09 RX ORDER — ZOLEDRONIC ACID 5 MG/100ML
5 INJECTION, SOLUTION INTRAVENOUS ONCE
Status: CANCELLED
Start: 2024-07-16

## 2024-07-09 RX ORDER — HEPARIN SODIUM,PORCINE 10 UNIT/ML
5-20 VIAL (ML) INTRAVENOUS DAILY PRN
Status: CANCELLED | OUTPATIENT
Start: 2024-07-16

## 2024-07-09 NOTE — PROGRESS NOTES
Tennille is a 83 year old who is being evaluated via a billable video visit.    How would you like to obtain your AVS? MyChart  If the video visit is dropped, the invitation should be resent by: Text to cell phone: [phone]  Will anyone else be joining your video visit? No          Subjective   Tennille is a 83 year old, presenting for the following health issues:  No chief complaint on file.      Video Start Time: 3:36 PM    This is a video visit with the patient and her  present for 2 issues.    The patient has osteoporosis of the right hip as noted.  She is never fractured.  She has not had a bone density test for years.  She has not been on treatment in the past.  She does have acid reflux so does not want to do oral bisphosphonates which is reasonable.  She does exercise some, does not smoke or drink excessively.    We discussed the use of the different therapies.  I explained to her that she needs 1200 mg of calcium a day as well as vitamin D3 1000 units a day.    We then discussed treatment for her osteoporosis with prescription medication.  I discussed with her Fosamax orally, IV Reclast, and Prolia.  I think these would be the best options.  Again she does not want to use oral therapy due to the issues of possible esophagitis.  I discussed the use of IV Reclast and potential side effects including osteonecrosis of the jaw.  She would like to try this and I ordered it.    We also discussed her cholesterol.  As noted during her physical she was having some leg aches and I stopped the Crestor and with this her symptoms have resolved.  She does have coronary disease as noted.  With respect to the cholesterol I like her to try Crestor every other day.  If she has more aches she will let me know.    ASSESSMENT:  Osteopor  Elevated cholesterol  Gerd    PLAN:  Above    Discussed with patient and .    Shawn Méndez M.D.      Vitals:  No vitals were obtained today due to virtual visit.    Physical Exam    GENERAL: alert and no distress  EYES: Eyes grossly normal to inspection.  No discharge or erythema, or obvious scleral/conjunctival abnormalities.  RESP: No audible wheeze, cough, or visible cyanosis.    SKIN: Visible skin clear. No significant rash, abnormal pigmentation or lesions.  NEURO: Cranial nerves grossly intact.  Mentation and speech appropriate for age.  PSYCH: Appropriate affect, tone, and pace of words          Video-Visit Details    Type of service:  Video Visit   Video End Time:4:02 PM  Originating Location (pt. Location): Home    Distant Location (provider location):  On-site  Platform used for Video Visit: Sha  Signed Electronically by: Shawn Méndez MD

## 2024-07-11 ENCOUNTER — TELEPHONE (OUTPATIENT)
Dept: FAMILY MEDICINE | Facility: CLINIC | Age: 84
End: 2024-07-11
Payer: MEDICARE

## 2024-07-11 NOTE — TELEPHONE ENCOUNTER
Shawn Méndez MD  [facility] Nurse [place] - Primary Care  Please make sure she gets iv reclast as ordered    Appointments in Next Year      Sep 23, 2024 8:15 AM  LAB with [facility]  [facility] ([facility] ) [phone]     Sep 24, 2024 9:15 AM  (Arrive by 9:05 AM)  Return Cardiology with Agus Gill MD  [facility] ([facility] ) [phone]     Jun 12, 2025 11:00 AM  (Arrive by 10:40 AM)  Annual Wellness Visit with Shawn Méndez MD  [facility] ([facility] ) [phone]          Sent Calendly message to patient to schedule     ALSO routing to infusion scheduling team to follow up with patient     Saray Loco RN  [facility]

## 2024-07-29 ENCOUNTER — TELEPHONE (OUTPATIENT)
Dept: FAMILY MEDICINE | Facility: CLINIC | Age: 84
End: 2024-07-29
Payer: MEDICARE

## 2024-07-29 NOTE — TELEPHONE ENCOUNTER
"S-(situation): Patient calling stating, \"I had a bone density done. I need the order sent to my insurance or they won't pay for it.\"     Writer advised patient speak with business dept. Transferred call back to .    "

## 2024-09-13 ENCOUNTER — INFUSION THERAPY VISIT (OUTPATIENT)
Dept: INFUSION THERAPY | Facility: CLINIC | Age: 84
End: 2024-09-13
Attending: INTERNAL MEDICINE
Payer: MEDICARE

## 2024-09-13 VITALS
SYSTOLIC BLOOD PRESSURE: 117 MMHG | BODY MASS INDEX: 38.53 KG/M2 | HEIGHT: 61 IN | WEIGHT: 204.1 LBS | DIASTOLIC BLOOD PRESSURE: 78 MMHG | OXYGEN SATURATION: 97 % | TEMPERATURE: 98.3 F | HEART RATE: 61 BPM | RESPIRATION RATE: 18 BRPM

## 2024-09-13 DIAGNOSIS — K21.9 GASTROESOPHAGEAL REFLUX DISEASE, UNSPECIFIED WHETHER ESOPHAGITIS PRESENT: Primary | ICD-10-CM

## 2024-09-13 DIAGNOSIS — M81.0 AGE-RELATED OSTEOPOROSIS WITHOUT CURRENT PATHOLOGICAL FRACTURE: ICD-10-CM

## 2024-09-13 LAB
CALCIUM SERPL-MCNC: 9.7 MG/DL (ref 8.8–10.4)
CREAT SERPL-MCNC: 1 MG/DL (ref 0.51–0.95)
EGFRCR SERPLBLD CKD-EPI 2021: 56 ML/MIN/1.73M2
HOLD SPECIMEN: NORMAL
HOLD SPECIMEN: NORMAL

## 2024-09-13 PROCEDURE — 250N000011 HC RX IP 250 OP 636: Performed by: INTERNAL MEDICINE

## 2024-09-13 PROCEDURE — 82565 ASSAY OF CREATININE: CPT

## 2024-09-13 PROCEDURE — 258N000003 HC RX IP 258 OP 636: Performed by: INTERNAL MEDICINE

## 2024-09-13 PROCEDURE — 96365 THER/PROPH/DIAG IV INF INIT: CPT

## 2024-09-13 PROCEDURE — 99207 PR NO CHARGE LOS: CPT

## 2024-09-13 PROCEDURE — 82310 ASSAY OF CALCIUM: CPT

## 2024-09-13 PROCEDURE — 36415 COLL VENOUS BLD VENIPUNCTURE: CPT

## 2024-09-13 RX ORDER — ZOLEDRONIC ACID 5 MG/100ML
5 INJECTION, SOLUTION INTRAVENOUS ONCE
Status: COMPLETED | OUTPATIENT
Start: 2024-09-13 | End: 2024-09-13

## 2024-09-13 RX ORDER — ZOLEDRONIC ACID 5 MG/100ML
5 INJECTION, SOLUTION INTRAVENOUS ONCE
Start: 2025-09-13

## 2024-09-13 RX ORDER — EPINEPHRINE 1 MG/ML
0.3 INJECTION, SOLUTION INTRAMUSCULAR; SUBCUTANEOUS EVERY 5 MIN PRN
OUTPATIENT
Start: 2025-09-13

## 2024-09-13 RX ORDER — HEPARIN SODIUM,PORCINE 10 UNIT/ML
5-20 VIAL (ML) INTRAVENOUS DAILY PRN
OUTPATIENT
Start: 2025-09-13

## 2024-09-13 RX ORDER — ALBUTEROL SULFATE 90 UG/1
1-2 AEROSOL, METERED RESPIRATORY (INHALATION)
Start: 2025-09-13

## 2024-09-13 RX ORDER — ALBUTEROL SULFATE 0.83 MG/ML
2.5 SOLUTION RESPIRATORY (INHALATION)
OUTPATIENT
Start: 2025-09-13

## 2024-09-13 RX ORDER — GABAPENTIN 100 MG/1
300 CAPSULE ORAL
COMMUNITY
Start: 2024-07-02

## 2024-09-13 RX ORDER — HEPARIN SODIUM (PORCINE) LOCK FLUSH IV SOLN 100 UNIT/ML 100 UNIT/ML
5 SOLUTION INTRAVENOUS
OUTPATIENT
Start: 2025-09-13

## 2024-09-13 RX ORDER — MEPERIDINE HYDROCHLORIDE 25 MG/ML
25 INJECTION INTRAMUSCULAR; INTRAVENOUS; SUBCUTANEOUS EVERY 30 MIN PRN
OUTPATIENT
Start: 2025-09-13

## 2024-09-13 RX ORDER — METHYLPREDNISOLONE SODIUM SUCCINATE 125 MG/2ML
125 INJECTION, POWDER, LYOPHILIZED, FOR SOLUTION INTRAMUSCULAR; INTRAVENOUS
Start: 2025-09-13

## 2024-09-13 RX ORDER — DIPHENHYDRAMINE HYDROCHLORIDE 50 MG/ML
50 INJECTION INTRAMUSCULAR; INTRAVENOUS
Start: 2025-09-13

## 2024-09-13 RX ADMIN — ZOLEDRONIC ACID 5 MG: 5 INJECTION, SOLUTION INTRAVENOUS at 14:04

## 2024-09-13 RX ADMIN — SODIUM CHLORIDE 250 ML: 9 INJECTION, SOLUTION INTRAVENOUS at 14:02

## 2024-09-13 NOTE — PROGRESS NOTES
Infusion Nursing Note:  Tennille Lee presents today for NEW Reclast.    Patient seen by provider today: No   present during visit today: Not Applicable.    Note: Patient new to [facility] center, oriented to infusion center. Patient given written education and discussed what to expect and side effects. Patient denied dental concerns and sees the dentist regularly. Confirmed patient is taking calcium and vitamin D. Patient had no further questions/concerns prior to starting.       Intravenous Access:  Peripheral IV placed.    Treatment Conditions:  Lab Results   Component Value Date     06/07/2024    POTASSIUM 4.3 06/07/2024    CR 1.00 (H) 09/13/2024    YUNIOR 9.7 09/13/2024    BILITOTAL 1.2 06/07/2024    ALBUMIN 4.3 06/07/2024    ALT 18 06/07/2024    AST 20 06/07/2024       Results reviewed, labs MET treatment parameters, ok to proceed with treatment.      Post Infusion Assessment:  Patient tolerated infusion without incident.  Blood return noted pre and post infusion.  Site patent and intact, free from redness, edema or discomfort.  No evidence of extravasations.  Access discontinued per protocol.       Discharge Plan:   [facility]  Patient will return next year for next appointment.   Patient discharged in stable condition accompanied by: self.  Departure Mode: Ambulatory.      Gris Moulton RN

## 2024-09-23 ENCOUNTER — LAB (OUTPATIENT)
Dept: LAB | Facility: CLINIC | Age: 84
End: 2024-09-23
Payer: MEDICARE

## 2024-09-23 DIAGNOSIS — I10 HTN (HYPERTENSION): Primary | ICD-10-CM

## 2024-09-23 DIAGNOSIS — I10 HYPERTENSION, UNSPECIFIED TYPE: ICD-10-CM

## 2024-09-23 DIAGNOSIS — I25.10 CORONARY ARTERY DISEASE INVOLVING NATIVE CORONARY ARTERY OF NATIVE HEART WITHOUT ANGINA PECTORIS: ICD-10-CM

## 2024-09-23 DIAGNOSIS — E78.5 HYPERLIPIDEMIA WITH TARGET LDL LESS THAN 70: ICD-10-CM

## 2024-09-23 DIAGNOSIS — R06.09 DOE (DYSPNEA ON EXERTION): ICD-10-CM

## 2024-09-23 DIAGNOSIS — I25.10 CORONARY ARTERY DISEASE INVOLVING NATIVE CORONARY ARTERY OF NATIVE HEART WITHOUT ANGINA PECTORIS: Primary | ICD-10-CM

## 2024-09-23 DIAGNOSIS — E78.5 HYPERLIPIDEMIA LDL GOAL <130: ICD-10-CM

## 2024-09-23 LAB
ALT SERPL W P-5'-P-CCNC: 14 U/L (ref 0–50)
ANION GAP SERPL CALCULATED.3IONS-SCNC: 10 MMOL/L (ref 7–15)
BUN SERPL-MCNC: 15 MG/DL (ref 8–23)
CALCIUM SERPL-MCNC: 9.5 MG/DL (ref 8.8–10.4)
CHLORIDE SERPL-SCNC: 103 MMOL/L (ref 98–107)
CHOLEST SERPL-MCNC: 141 MG/DL
CREAT SERPL-MCNC: 1 MG/DL (ref 0.51–0.95)
EGFRCR SERPLBLD CKD-EPI 2021: 56 ML/MIN/1.73M2
FASTING STATUS PATIENT QL REPORTED: YES
GLUCOSE SERPL-MCNC: 98 MG/DL (ref 70–99)
HCO3 SERPL-SCNC: 27 MMOL/L (ref 22–29)
HDLC SERPL-MCNC: 72 MG/DL
LDLC SERPL CALC-MCNC: 54 MG/DL
NONHDLC SERPL-MCNC: 69 MG/DL
POTASSIUM SERPL-SCNC: 4.2 MMOL/L (ref 3.4–5.3)
SODIUM SERPL-SCNC: 140 MMOL/L (ref 135–145)
TRIGL SERPL-MCNC: 76 MG/DL

## 2024-09-23 PROCEDURE — 80048 BASIC METABOLIC PNL TOTAL CA: CPT | Performed by: INTERNAL MEDICINE

## 2024-09-23 PROCEDURE — 84460 ALANINE AMINO (ALT) (SGPT): CPT | Performed by: INTERNAL MEDICINE

## 2024-09-23 PROCEDURE — 36415 COLL VENOUS BLD VENIPUNCTURE: CPT | Performed by: INTERNAL MEDICINE

## 2024-09-23 PROCEDURE — 80061 LIPID PANEL: CPT | Performed by: INTERNAL MEDICINE

## 2024-09-24 ENCOUNTER — OFFICE VISIT (OUTPATIENT)
Dept: CARDIOLOGY | Facility: CLINIC | Age: 84
End: 2024-09-24
Attending: INTERNAL MEDICINE
Payer: MEDICARE

## 2024-09-24 VITALS
DIASTOLIC BLOOD PRESSURE: 76 MMHG | SYSTOLIC BLOOD PRESSURE: 113 MMHG | HEART RATE: 67 BPM | BODY MASS INDEX: 38.12 KG/M2 | WEIGHT: 201.9 LBS | HEIGHT: 61 IN

## 2024-09-24 DIAGNOSIS — I20.89 OTHER FORMS OF ANGINA PECTORIS (H): ICD-10-CM

## 2024-09-24 DIAGNOSIS — E78.5 HYPERLIPIDEMIA LDL GOAL <130: ICD-10-CM

## 2024-09-24 DIAGNOSIS — I25.10 CORONARY ARTERY DISEASE INVOLVING NATIVE CORONARY ARTERY OF NATIVE HEART WITHOUT ANGINA PECTORIS: ICD-10-CM

## 2024-09-24 PROCEDURE — 99214 OFFICE O/P EST MOD 30 MIN: CPT | Performed by: INTERNAL MEDICINE

## 2024-09-24 RX ORDER — EZETIMIBE 10 MG/1
10 TABLET ORAL DAILY
Qty: 90 TABLET | Refills: 3 | Status: CANCELLED | OUTPATIENT
Start: 2024-09-24

## 2024-09-24 NOTE — LETTER
9/24/2024    Shawn Méndez MD  [address]  [place] [address]    RE: Tennille Lee       Dear Colleague,     I had the pleasure of seeing Tennille Lee in the [facility].  HPI and Plan:   I the pleasure of following up with Tennille Lee.  She is also my cousin.  From a cardiac standpoint she is doing quite well.  She has mild coronary disease based on coronary angiogram from more than a decade ago her tightest narrowing was 40%.    A nuclear stress test done last year showed normal ejection fraction and no ischemia or infarction.  An echocardiogram was also performed last year ejection fraction was normal she had aortic valve sclerosis.  They were not able to estimate RVSP due to lack of tricuspid insufficiency but the IVC size was normal suggesting normal right atrial pressure and the right ventricle was normal.  That was all done as part of the workup for dyspnea on exertion.  She now states that she is doing well with no dyspnea no chest pain.  We reviewed her lipid panel today it is quite good her creatinine is minimally elevated GFR of 56.  Her blood sugars essentially normal and the hemoglobin A1c was 5.8.  At this point blood pressure is controlled cholesterol numbers are controlled she is feeling well I would not make any changes now on exam I do hear a systolic murmur its almost certainly the aortic valve sclerosis I do not think she would need another echo for at least 3 to 5 years and I do not think she needs another stress test for several years unless there is a change in her clinical pattern.  She should probably have a follow-up BMP and blood sugar next year she does see Dr. Sifuentes and I am sure he is can be following her for that.  At this point I do not think she needs any advanced medications for chronic kidney or blood sugar such as SGLT2 medicines    Consult time 30 minutes we will see her back in 1 year    No orders of the defined types were placed in this  encounter.    Orders Placed This Encounter   Medications     Zoledronic Acid (RECLAST IV)     Sig: Inject into the vein. Infusion one time per year     There are no discontinued medications.      Encounter Diagnoses   Name Primary?     Coronary artery disease involving native coronary artery of native heart without angina pectoris      Other forms of angina pectoris (H24)      Hyperlipidemia LDL goal <130      CAD (coronary artery disease)        CURRENT MEDICATIONS:  Current Outpatient Medications   Medication Sig Dispense Refill     artificial tears OINT ophthalmic ointment At Bedtime       Calcium Carb-Cholecalciferol 600-12.5 MG-MCG CAPS Take by mouth 2 times daily.       Coenzyme Q10 (COQ-10 PO) Take 100 mg by mouth three times a week        cyanocobalamin (VITAMIN  B-12) 1000 MCG tablet Take 1,000 mcg by mouth three times a week.       ezetimibe (ZETIA) 10 MG tablet Take 1 tablet (10 mg) by mouth daily 90 tablet 3     gabapentin (NEURONTIN) 100 MG capsule 300 mg.       Magnesium Oxide 250 MG TABS Take 250 mg by mouth daily Takes 3 times per week       omeprazole (PRILOSEC) 20 MG DR capsule Take 1 capsule (20 mg) by mouth daily (Patient taking differently: Take 20 mg by mouth three times a week.) 90 capsule 2     polyethylene glycol-propylene glycol (SYSTANE ULTRA) 0.4-0.3 % SOLN ophthalmic solution Place 1 drop into both eyes every hour as needed for dry eyes.       rosuvastatin (CRESTOR) 20 MG tablet Take 1 tablet (20 mg) by mouth every other day 45 tablet 3     traZODone (DESYREL) 100 MG tablet Take 150 mg by mouth at bedtime.       Zoledronic Acid (RECLAST IV) Inject into the vein. Infusion one time per year         ALLERGIES     Allergies   Allergen Reactions     Eszopiclone Muscle Pain (Myalgia)     Nickel      Positive on patch test     Blue Dyes (Parenteral) Other (See Comments)     Disperse Blue 106- Blue textile Dye     Lorazepam      Other Reaction(s): Leg cramps at night     Amoxicillin      Balsam  Unknown     Balsam [place]-Castor [Balsam [place]-Castor Oil]      Cephalexin Diarrhea     Clindamycin Hcl      Conj Estrog-Medroxyprogest Ace Other (See Comments)     Leg aching and cramps     Erythromycin      Eugenol      Influenza Virus Vaccine      Neomycin Unknown     Neomycin Sulfate      Nizatidine Other (See Comments)     Elevated liver function tests     Raloxifene Other (See Comments)     Exacerbatoin of hot flashes       PAST MEDICAL HISTORY:  Past Medical History:   Diagnosis Date     Abdominal pain 06/2016    ct abd and pelvis with liver hemangiomas and renal cysts     ASCVD (arteriosclerotic cardiovascular disease) 08/2012    angio done and med mgmt, done after abnl ct angio     Aragon's cyst     left     Degenerative disk disease     bulge cervicle disk     DJD (degenerative joint disease)      GERD (gastroesophageal reflux disease) 2002    egd done     Gilbert's disease     borderline elevated BR     Hemangioma of liver 09/2012    ct angio with ?liver lesion, then ct of liver showed it be hemangioma, also renal cysts     Hx of colonoscopy 2010    [facility] [place]     Hypercholesteremia      Hypertension      IFG (impaired fasting glucose)      Lumbar spinal stenosis 2018    done in [place], had mri     Obese     prior phen/fen   no valve dz     JOSE (obstructive sleep apnea) 2008    cpap     osteoporosis of hip 06/2024     Other symptoms involving cardiovascular system     bruit     Rosacea      Thyroid nodules 2011    Dr. Guzmán--benign     TMJ (dislocation of temporomandibular joint)     left     Urine incontinence      Vertigo        PAST SURGICAL HISTORY:  Past Surgical History:   Procedure Laterality Date     carpel tunnel surgery  01/01/2011     CATARACT EXTRACTION  08/2022     cataract surgery  06/2023     CORONARY ANGIOGRAPHY ADULT ORDER  08/07/2012    moderate CAD     lid lag surgery  01/01/2004     right knee arthroscopy  01/01/1999     TONSILLECTOMY  child       FAMILY HISTORY:  Family History    Problem Relation Age of Onset     Cancer - colorectal Father      Heart Disease Father 40        MI     Myocardial Infarction Father      Heart Defect Mother      Heart Surgery Mother         CABG     Heart Surgery Brother         CABG     Heart Surgery Daughter 40        stent placed       SOCIAL HISTORY:  Social History     Socioeconomic History     Marital status:      Number of children: 3   Occupational History     Occupation: homemaker   Tobacco Use     Smoking status: Former     Current packs/day: 0.00     Average packs/day: 1 pack/day for 5.4 years (5.4 ttl pk-yrs)     Types: Cigarettes     Start date:      Quit date: 1960     Years since quittin.3     Smokeless tobacco: Never   Vaping Use     Vaping status: Never Used   Substance and Sexual Activity     Alcohol use: No     Alcohol/week: 0.0 standard drinks of alcohol     Drug use: No     Sexual activity: Not Currently   Other Topics Concern     Parent/sibling w/ CABG, MI or angioplasty before 65F 55M? Yes     Caffeine Concern No     Comment: decaf:  0-2 cups a day     Sleep Concern Yes     Comment: soemtimes takes lorazepam     Stress Concern Yes     Weight Concern Yes     Special Diet No     Exercise Yes     Comment: water aerobics x4 days a week     Seat Belt Yes     Social Determinants of Health     Financial Resource Strain: Low Risk  (2024)    Financial Resource Strain      Within the past 12 months, have you or your family members you live with been unable to get utilities (heat, electricity) when it was really needed?: No   Food Insecurity: Low Risk  (2024)    Food Insecurity      Within the past 12 months, did you worry that your food would run out before you got money to buy more?: No      Within the past 12 months, did the food you bought just not last and you didn t have money to get more?: No   Transportation Needs: Low Risk  (2024)    Transportation Needs      Within the past 12 months, has lack of  "transportation kept you from medical appointments, getting your medicines, non-medical meetings or appointments, work, or from getting things that you need?: No   Physical Activity: Unknown (6/7/2024)    Exercise Vital Sign      Days of Exercise per Week: 3 days   Stress: No Stress Concern Present (6/7/2024)    [employer] - Occupational Stress Questionnaire      Feeling of Stress : Not at all   Social Connections: Unknown (6/7/2024)    Social Connection and Isolation Panel [NHANES]      Frequency of Social Gatherings with Friends and Family: More than three times a week   Interpersonal Safety: Low Risk  (6/7/2024)    Interpersonal Safety      Do you feel physically and emotionally safe where you currently live?: Yes      Within the past 12 months, have you been hit, slapped, kicked or otherwise physically hurt by someone?: No      Within the past 12 months, have you been humiliated or emotionally abused in other ways by your partner or ex-partner?: No   Housing Stability: Low Risk  (6/7/2024)    Housing Stability      Do you have housing? : Yes      Are you worried about losing your housing?: No       Review of Systems:  Skin:        Eyes:       ENT:       Respiratory:       Cardiovascular:       Gastroenterology:      Genitourinary:       Musculoskeletal:       Neurologic:       Psychiatric:       Heme/Lymph/Imm:       Endocrine:         Physical Exam:  Vitals: /76   Pulse 67   Ht 1.549 m (5' 1\")   Wt 91.6 kg (201 lb 14.4 oz)   LMP  (LMP Unknown)   BMI 38.15 kg/m        Constitutional:  cooperative, alert and oriented, well developed, well nourished, in no acute distress        Skin:  warm and dry to the touch, no apparent skin lesions or masses noted          Head:  normocephalic, no masses or lesions;normocephalic        Eyes:  pupils equal and round, conjunctivae and lids unremarkable, sclera white, no xanthalasma, EOMS intact, no nystagmus        Lymph:No Cervical " lymphadenopathy present;No thyromegaly     ENT:           Neck:  carotid pulses are full and equal bilaterally, JVP normal, no carotid bruit        Respiratory:  normal breath sounds, clear to auscultation, normal A-P diameter, normal symmetry, normal respiratory excursion, no use of accessory muscles         Cardiac: regular rhythm       systolic ejection murmur;grade 1                      1+             1+            GI:  abdomen soft;BS normoactive        Extremities and Muscular Skeletal:  no deformities, clubbing, cyanosis, erythema observed   bilateral LE edema;trace          Neurological:  no gross motor deficits        Psych:  Alert and Oriented x 3      Recent Lab Results:  LIPID RESULTS:  Lab Results   Component Value Date    CHOL 141 09/23/2024    CHOL 165 10/04/2019    HDL 72 09/23/2024    HDL 71 10/04/2019    LDL 54 09/23/2024    LDL 69 10/04/2019    TRIG 76 09/23/2024    TRIG 127 10/04/2019    CHOLHDLRATIO 2.7 08/25/2015       LIVER ENZYME RESULTS:  Lab Results   Component Value Date    AST 20 06/07/2024    AST 11 06/24/2016    ALT 14 09/23/2024    ALT 23 10/04/2019       CBC RESULTS:  Lab Results   Component Value Date    WBC 4.3 06/07/2024    WBC 5.5 09/14/2018    RBC 5.06 06/07/2024    RBC 4.68 09/14/2018    HGB 14.0 06/07/2024    HGB 13.2 09/14/2018    HCT 43.4 06/07/2024    HCT 40.2 09/14/2018    MCV 86 06/07/2024    MCV 86 09/14/2018    MCH 27.7 06/07/2024    MCH 28.2 09/14/2018    MCHC 32.3 06/07/2024    MCHC 32.8 09/14/2018    RDW 12.6 06/07/2024    RDW 13.0 09/14/2018     06/07/2024     09/14/2018       BMP RESULTS:  Lab Results   Component Value Date     09/23/2024     10/04/2019    POTASSIUM 4.2 09/23/2024    POTASSIUM 3.9 07/19/2022    POTASSIUM 4.0 10/04/2019    CHLORIDE 103 09/23/2024    CHLORIDE 108 07/19/2022    CHLORIDE 109 10/04/2019    CO2 27 09/23/2024    CO2 25 07/19/2022    CO2 32 10/04/2019    ANIONGAP 10 09/23/2024    ANIONGAP 8 07/19/2022    ANIONGAP  1 (L) 10/04/2019    GLC 98 09/23/2024     (H) 07/19/2022     (H) 10/04/2019    BUN 15.0 09/23/2024    BUN 17 07/19/2022    BUN 19 10/04/2019    CR 1.00 (H) 09/23/2024    CR 0.90 10/04/2019    GFRESTIMATED 56 (L) 09/23/2024    GFRESTIMATED 61 10/04/2019    GFRESTBLACK 70 10/04/2019    YUNIOR 9.5 09/23/2024    YUNIOR 9.4 10/04/2019        A1C RESULTS:  Lab Results   Component Value Date    A1C 5.8 (H) 06/07/2024       INR RESULTS:  Lab Results   Component Value Date    INR 0.96 08/07/2012           CC  Agus Gill MD  [address]  [place] [id number]      Thank you for allowing me to participate in the care of your patient.      Sincerely,     Agus Gill MD     [facility]  cc:   Agus Gill MD  [address]  [place] [phone]

## 2024-09-24 NOTE — PROGRESS NOTES
HPI and Plan:   I the pleasure of following up with Tennille Lee.  She is also my cousin.  From a cardiac standpoint she is doing quite well.  She has mild coronary disease based on coronary angiogram from more than a decade ago her tightest narrowing was 40%.    A nuclear stress test done last year showed normal ejection fraction and no ischemia or infarction.  An echocardiogram was also performed last year ejection fraction was normal she had aortic valve sclerosis.  They were not able to estimate RVSP due to lack of tricuspid insufficiency but the IVC size was normal suggesting normal right atrial pressure and the right ventricle was normal.  That was all done as part of the workup for dyspnea on exertion.  She now states that she is doing well with no dyspnea no chest pain.  We reviewed her lipid panel today it is quite good her creatinine is minimally elevated GFR of 56.  Her blood sugars essentially normal and the hemoglobin A1c was 5.8.  At this point blood pressure is controlled cholesterol numbers are controlled she is feeling well I would not make any changes now on exam I do hear a systolic murmur its almost certainly the aortic valve sclerosis I do not think she would need another echo for at least 3 to 5 years and I do not think she needs another stress test for several years unless there is a change in her clinical pattern.  She should probably have a follow-up BMP and blood sugar next year she does see Dr. Sifuentes and I am sure he is can be following her for that.  At this point I do not think she needs any advanced medications for chronic kidney or blood sugar such as SGLT2 medicines    Consult time 30 minutes we will see her back in 1 year    No orders of the defined types were placed in this encounter.    Orders Placed This Encounter   Medications    Zoledronic Acid (RECLAST IV)     Sig: Inject into the vein. Infusion one time per year     There are no discontinued medications.      Encounter  Diagnoses   Name Primary?    Coronary artery disease involving native coronary artery of native heart without angina pectoris     Other forms of angina pectoris (H24)     Hyperlipidemia LDL goal <130     CAD (coronary artery disease)        CURRENT MEDICATIONS:  Current Outpatient Medications   Medication Sig Dispense Refill    artificial tears OINT ophthalmic ointment At Bedtime      Calcium Carb-Cholecalciferol 600-12.5 MG-MCG CAPS Take by mouth 2 times daily.      Coenzyme Q10 (COQ-10 PO) Take 100 mg by mouth three times a week       cyanocobalamin (VITAMIN  B-12) 1000 MCG tablet Take 1,000 mcg by mouth three times a week.      ezetimibe (ZETIA) 10 MG tablet Take 1 tablet (10 mg) by mouth daily 90 tablet 3    gabapentin (NEURONTIN) 100 MG capsule 300 mg.      Magnesium Oxide 250 MG TABS Take 250 mg by mouth daily Takes 3 times per week      omeprazole (PRILOSEC) 20 MG DR capsule Take 1 capsule (20 mg) by mouth daily (Patient taking differently: Take 20 mg by mouth three times a week.) 90 capsule 2    polyethylene glycol-propylene glycol (SYSTANE ULTRA) 0.4-0.3 % SOLN ophthalmic solution Place 1 drop into both eyes every hour as needed for dry eyes.      rosuvastatin (CRESTOR) 20 MG tablet Take 1 tablet (20 mg) by mouth every other day 45 tablet 3    traZODone (DESYREL) 100 MG tablet Take 150 mg by mouth at bedtime.      Zoledronic Acid (RECLAST IV) Inject into the vein. Infusion one time per year         ALLERGIES     Allergies   Allergen Reactions    Eszopiclone Muscle Pain (Myalgia)    Nickel      Positive on patch test    Blue Dyes (Parenteral) Other (See Comments)     Disperse Blue 106- Blue textile Dye    Lorazepam      Other Reaction(s): Leg cramps at night    Amoxicillin     Balsam Unknown    Balsam Peru-Castor [Balsam [place]-Castor Oil]     Cephalexin Diarrhea    Clindamycin Hcl     Conj Estrog-Medroxyprogest Ace Other (See Comments)     Leg aching and cramps    Erythromycin     Eugenol     Influenza Virus  Vaccine     Neomycin Unknown    Neomycin Sulfate     Nizatidine Other (See Comments)     Elevated liver function tests    Raloxifene Other (See Comments)     Exacerbatoin of hot flashes       PAST MEDICAL HISTORY:  Past Medical History:   Diagnosis Date    Abdominal pain 06/2016    ct abd and pelvis with liver hemangiomas and renal cysts    ASCVD (arteriosclerotic cardiovascular disease) 08/2012    angio done and med mgmt, done after abnl ct angio    Aragon's cyst     left    Degenerative disk disease     bulge cervicle disk    DJD (degenerative joint disease)     GERD (gastroesophageal reflux disease) 2002    egd done    Gilbert's disease     borderline elevated BR    Hemangioma of liver 09/2012    ct angio with ?liver lesion, then ct of liver showed it be hemangioma, also renal cysts    Hx of colonoscopy 2010    [facility]    Hypercholesteremia     Hypertension     IFG (impaired fasting glucose)     Lumbar spinal stenosis 2018    done in [place], had mri    Obese     prior phen/fen   no valve dz    JOSE (obstructive sleep apnea) 2008    cpap    osteoporosis of hip 06/2024    Other symptoms involving cardiovascular system     bruit    Rosacea     Thyroid nodules 2011    Dr. Guzmán--benign    TMJ (dislocation of temporomandibular joint)     left    Urine incontinence     Vertigo        PAST SURGICAL HISTORY:  Past Surgical History:   Procedure Laterality Date    carpel tunnel surgery  01/01/2011    CATARACT EXTRACTION  08/2022    cataract surgery  06/2023    CORONARY ANGIOGRAPHY ADULT ORDER  08/07/2012    moderate CAD    lid lag surgery  01/01/2004    right knee arthroscopy  01/01/1999    TONSILLECTOMY  child       FAMILY HISTORY:  Family History   Problem Relation Age of Onset    Cancer - colorectal Father     Heart Disease Father 40        MI    Myocardial Infarction Father     Heart Defect Mother     Heart Surgery Mother         CABG    Heart Surgery Brother         CABG    Heart Surgery Daughter 40         stent placed       SOCIAL HISTORY:  Social History     Socioeconomic History    Marital status:     Number of children: 3   Occupational History    Occupation: homemaker   Tobacco Use    Smoking status: Former     Current packs/day: 0.00     Average packs/day: 1 pack/day for 5.4 years (5.4 ttl pk-yrs)     Types: Cigarettes     Start date:      Quit date: 1960     Years since quittin.3    Smokeless tobacco: Never   Vaping Use    Vaping status: Never Used   Substance and Sexual Activity    Alcohol use: No     Alcohol/week: 0.0 standard drinks of alcohol    Drug use: No    Sexual activity: Not Currently   Other Topics Concern    Parent/sibling w/ CABG, MI or angioplasty before 65F 55M? Yes    Caffeine Concern No     Comment: decaf:  0-2 cups a day    Sleep Concern Yes     Comment: soemtimes takes lorazepam    Stress Concern Yes    Weight Concern Yes    Special Diet No    Exercise Yes     Comment: water aerobics x4 days a week    Seat Belt Yes     Social Determinants of Health     Financial Resource Strain: Low Risk  (2024)    Financial Resource Strain     Within the past 12 months, have you or your family members you live with been unable to get utilities (heat, electricity) when it was really needed?: No   Food Insecurity: Low Risk  (2024)    Food Insecurity     Within the past 12 months, did you worry that your food would run out before you got money to buy more?: No     Within the past 12 months, did the food you bought just not last and you didn t have money to get more?: No   Transportation Needs: Low Risk  (2024)    Transportation Needs     Within the past 12 months, has lack of transportation kept you from medical appointments, getting your medicines, non-medical meetings or appointments, work, or from getting things that you need?: No   Physical Activity: Unknown (2024)    Exercise Vital Sign     Days of Exercise per Week: 3 days   Stress: No Stress Concern Present (2024)  "   [employer] - Occupational Stress Questionnaire     Feeling of Stress : Not at all   Social Connections: Unknown (6/7/2024)    Social Connection and Isolation Panel [NHANES]     Frequency of Social Gatherings with Friends and Family: More than three times a week   Interpersonal Safety: Low Risk  (6/7/2024)    Interpersonal Safety     Do you feel physically and emotionally safe where you currently live?: Yes     Within the past 12 months, have you been hit, slapped, kicked or otherwise physically hurt by someone?: No     Within the past 12 months, have you been humiliated or emotionally abused in other ways by your partner or ex-partner?: No   Housing Stability: Low Risk  (6/7/2024)    Housing Stability     Do you have housing? : Yes     Are you worried about losing your housing?: No       Review of Systems:  Skin:        Eyes:       ENT:       Respiratory:       Cardiovascular:       Gastroenterology:      Genitourinary:       Musculoskeletal:       Neurologic:       Psychiatric:       Heme/Lymph/Imm:       Endocrine:         Physical Exam:  Vitals: /76   Pulse 67   Ht 1.549 m (5' 1\")   Wt 91.6 kg (201 lb 14.4 oz)   LMP  (LMP Unknown)   BMI 38.15 kg/m        Constitutional:  cooperative, alert and oriented, well developed, well nourished, in no acute distress        Skin:  warm and dry to the touch, no apparent skin lesions or masses noted          Head:  normocephalic, no masses or lesions;normocephalic        Eyes:  pupils equal and round, conjunctivae and lids unremarkable, sclera white, no xanthalasma, EOMS intact, no nystagmus        Lymph:No Cervical lymphadenopathy present;No thyromegaly     ENT:           Neck:  carotid pulses are full and equal bilaterally, JVP normal, no carotid bruit        Respiratory:  normal breath sounds, clear to auscultation, normal A-P diameter, normal symmetry, normal respiratory excursion, no use of accessory muscles         Cardiac: " regular rhythm       systolic ejection murmur;grade 1                      1+             1+            GI:  abdomen soft;BS normoactive        Extremities and Muscular Skeletal:  no deformities, clubbing, cyanosis, erythema observed   bilateral LE edema;trace          Neurological:  no gross motor deficits        Psych:  Alert and Oriented x 3      Recent Lab Results:  LIPID RESULTS:  Lab Results   Component Value Date    CHOL 141 09/23/2024    CHOL 165 10/04/2019    HDL 72 09/23/2024    HDL 71 10/04/2019    LDL 54 09/23/2024    LDL 69 10/04/2019    TRIG 76 09/23/2024    TRIG 127 10/04/2019    CHOLHDLRATIO 2.7 08/25/2015       LIVER ENZYME RESULTS:  Lab Results   Component Value Date    AST 20 06/07/2024    AST 11 06/24/2016    ALT 14 09/23/2024    ALT 23 10/04/2019       CBC RESULTS:  Lab Results   Component Value Date    WBC 4.3 06/07/2024    WBC 5.5 09/14/2018    RBC 5.06 06/07/2024    RBC 4.68 09/14/2018    HGB 14.0 06/07/2024    HGB 13.2 09/14/2018    HCT 43.4 06/07/2024    HCT 40.2 09/14/2018    MCV 86 06/07/2024    MCV 86 09/14/2018    MCH 27.7 06/07/2024    MCH 28.2 09/14/2018    MCHC 32.3 06/07/2024    MCHC 32.8 09/14/2018    RDW 12.6 06/07/2024    RDW 13.0 09/14/2018     06/07/2024     09/14/2018       BMP RESULTS:  Lab Results   Component Value Date     09/23/2024     10/04/2019    POTASSIUM 4.2 09/23/2024    POTASSIUM 3.9 07/19/2022    POTASSIUM 4.0 10/04/2019    CHLORIDE 103 09/23/2024    CHLORIDE 108 07/19/2022    CHLORIDE 109 10/04/2019    CO2 27 09/23/2024    CO2 25 07/19/2022    CO2 32 10/04/2019    ANIONGAP 10 09/23/2024    ANIONGAP 8 07/19/2022    ANIONGAP 1 (L) 10/04/2019    GLC 98 09/23/2024     (H) 07/19/2022     (H) 10/04/2019    BUN 15.0 09/23/2024    BUN 17 07/19/2022    BUN 19 10/04/2019    CR 1.00 (H) 09/23/2024    CR 0.90 10/04/2019    GFRESTIMATED 56 (L) 09/23/2024    GFRESTIMATED 61 10/04/2019    GFRESTBLACK 70 10/04/2019    YUNIOR 9.5 09/23/2024     YUNIOR 9.4 10/04/2019        A1C RESULTS:  Lab Results   Component Value Date    A1C 5.8 (H) 06/07/2024       INR RESULTS:  Lab Results   Component Value Date    INR 0.96 08/07/2012           CC  Agus Gill MD  [id number]  [place] [id number]

## 2024-09-25 ENCOUNTER — TRANSFERRED RECORDS (OUTPATIENT)
Dept: HEALTH INFORMATION MANAGEMENT | Facility: CLINIC | Age: 84
End: 2024-09-25
Payer: MEDICARE

## 2024-10-09 ENCOUNTER — TRANSFERRED RECORDS (OUTPATIENT)
Dept: HEALTH INFORMATION MANAGEMENT | Facility: CLINIC | Age: 84
End: 2024-10-09
Payer: MEDICARE

## 2025-05-15 ENCOUNTER — RESULTS FOLLOW-UP (OUTPATIENT)
Dept: FAMILY MEDICINE | Facility: CLINIC | Age: 85
End: 2025-05-15

## 2025-05-15 ENCOUNTER — OFFICE VISIT (OUTPATIENT)
Dept: FAMILY MEDICINE | Facility: CLINIC | Age: 85
End: 2025-05-15
Payer: MEDICARE

## 2025-05-15 VITALS
SYSTOLIC BLOOD PRESSURE: 119 MMHG | BODY MASS INDEX: 37.57 KG/M2 | RESPIRATION RATE: 16 BRPM | TEMPERATURE: 98.7 F | OXYGEN SATURATION: 96 % | DIASTOLIC BLOOD PRESSURE: 80 MMHG | HEIGHT: 61 IN | HEART RATE: 77 BPM | WEIGHT: 199 LBS

## 2025-05-15 DIAGNOSIS — R10.12 LUQ ABDOMINAL PAIN: ICD-10-CM

## 2025-05-15 DIAGNOSIS — R32 URINARY INCONTINENCE, UNSPECIFIED TYPE: ICD-10-CM

## 2025-05-15 DIAGNOSIS — R10.13 EPIGASTRIC PAIN: ICD-10-CM

## 2025-05-15 DIAGNOSIS — Z01.818 PRE-OP EXAMINATION: Primary | ICD-10-CM

## 2025-05-15 DIAGNOSIS — R19.8 GAGGING EPISODE: ICD-10-CM

## 2025-05-15 DIAGNOSIS — M79.645 PAIN OF FINGER OF LEFT HAND: ICD-10-CM

## 2025-05-15 DIAGNOSIS — I25.10 ASCVD (ARTERIOSCLEROTIC CARDIOVASCULAR DISEASE): ICD-10-CM

## 2025-05-15 LAB
ERYTHROCYTE [DISTWIDTH] IN BLOOD BY AUTOMATED COUNT: 12.3 % (ref 10–15)
HCT VFR BLD AUTO: 42.4 % (ref 35–47)
HGB BLD-MCNC: 13.7 G/DL (ref 11.7–15.7)
MCH RBC QN AUTO: 27.7 PG (ref 26.5–33)
MCHC RBC AUTO-ENTMCNC: 32.3 G/DL (ref 31.5–36.5)
MCV RBC AUTO: 86 FL (ref 78–100)
PLATELET # BLD AUTO: 213 10E3/UL (ref 150–450)
RBC # BLD AUTO: 4.94 10E6/UL (ref 3.8–5.2)
WBC # BLD AUTO: 6.7 10E3/UL (ref 4–11)

## 2025-05-15 ASSESSMENT — PAIN SCALES - GENERAL: PAINLEVEL_OUTOF10: NO PAIN (0)

## 2025-05-15 NOTE — PATIENT INSTRUCTIONS
Get the ultrasound of your abdomen when you can.  Let me know if the pain worsens.    For the finger pain if it worsens let's have ortho see you    On the day of surgery you can take your meds with a sip of water.    Shawn

## 2025-05-15 NOTE — PROGRESS NOTES
Preoperative Evaluation  [facility]  [address], [address]  [place] [address]  Phone: [phone]  Primary Provider: Shawn Méndez MD  Pre-op Performing Provider: Shawn Méndez MD  May 15, 2025             5/15/2025   Surgical Information   What procedure is being done? for a leaky ureatha   Facility or Hospital where procedure/surgery will be performed: [facility]   Who is doing the procedure / surgery? dr Hogan   Date of surgery / procedure: Amena 3   Time of surgery / procedure: 11 oclock   Where do you plan to recover after surgery? at home with family     Fax number for surgical facility:         Justina Null is a 84 year old, presenting for the following:  No chief complaint on file.        HPI:   Due to longstanding issues with urinary incontinence she is now having surgery.    The patient is not overly active.  It is hard to say if she can do 4 METS but it does not sound like she would be able to.    She has had a pain in the left finger, the pointer finger, as of yesterday.    She notes epigastric pain that started in the last week.  She only gets it if she eats fast.  She can then gag and cough up or throw up some bile.  In between she is fine.  No fevers or night sweats or bowel changes.  No history of gallstones or pancreatitis.  She takes a PPI 3 days a week for acid reflux.  No history of ulcers or gallstone disease.  No history of pancreatitis.    She also notes a butterfly/pain that is very mild in the left upper quadrant occasionally in bed at night and sometimes during the day for several months now.  There is no pattern to it.    She is otherwise doing well.                Past Medical History:        Past Medical History:   Diagnosis Date    Abdominal pain 06/2016    ct abd and pelvis with liver hemangiomas and renal cysts    ASCVD (arteriosclerotic cardiovascular disease) 08/2012    angio done and med mgmt, done after abnl ct angio    Baker's  cyst     left    Degenerative disk disease     bulge cervicle disk    DJD (degenerative joint disease)     GERD (gastroesophageal reflux disease)     egd done    Gilbert's disease     borderline elevated BR    Hemangioma of liver 2012    ct angio with ?liver lesion, then ct of liver showed it be hemangioma, also renal cysts    Hx of colonoscopy 2010    [facility]    Hypercholesteremia     Hypertension     IFG (impaired fasting glucose)     Lumbar spinal stenosis 2018    done in [place], had mri    Obese     prior phen/fen   no valve dz    JOSE (obstructive sleep apnea)     cpap    osteoporosis of hip 2024    Other symptoms involving cardiovascular system     bruit    Rosacea     Thyroid nodules     Dr. Guzmán--benign    TMJ (dislocation of temporomandibular joint)     left    Urine incontinence     Vertigo              Past Surgical History:        Past Surgical History:   Procedure Laterality Date    carpel tunnel surgery  2011    CATARACT EXTRACTION  2022    cataract surgery  2023    CORONARY ANGIOGRAPHY ADULT ORDER  2012    moderate CAD    lid lag surgery  2004    right knee arthroscopy  1999    TONSILLECTOMY  child             Social History:     Social History     Tobacco Use    Smoking status: Former     Current packs/day: 0.00     Average packs/day: 1 pack/day for 5.4 years (5.4 ttl pk-yrs)     Types: Cigarettes     Start date:      Quit date: 1960     Years since quittin.9    Smokeless tobacco: Never   Substance Use Topics    Alcohol use: No     Alcohol/week: 0.0 standard drinks of alcohol             Family History:   No family history of bleeding difficulty, anesthesia problems or blood clots.         Allergies:     Allergies   Allergen Reactions    Eszopiclone Muscle Pain (Myalgia)    Nickel      Positive on patch test    Blue Dyes (Parenteral) Other (See Comments)     Disperse Blue 106- Blue textile Dye    Lorazepam      Other  "Reaction(s): Leg cramps at night    Amoxicillin     Balsam Unknown    Balsam [place]-Castor [Balsam [place]-Castor Oil]     Cephalexin Diarrhea    Clindamycin Hcl     Conj Estrog-Medroxyprogest Ace Other (See Comments)     Leg aching and cramps    Erythromycin     Eugenol     Influenza Virus Vaccine     Neomycin Unknown    Neomycin Sulfate     Nizatidine Other (See Comments)     Elevated liver function tests    Raloxifene Other (See Comments)     Exacerbatoin of hot flashes             Medications:     Current Outpatient Medications   Medication Sig Dispense Refill    Calcium Carb-Cholecalciferol 600-12.5 MG-MCG CAPS Take by mouth 2 times daily.      Coenzyme Q10 (COQ-10 PO) Take 100 mg by mouth three times a week       cyanocobalamin (VITAMIN  B-12) 1000 MCG tablet Take 1,000 mcg by mouth three times a week.      ezetimibe (ZETIA) 10 MG tablet Take 1 tablet (10 mg) by mouth daily 90 tablet 3    gabapentin (NEURONTIN) 100 MG capsule 300 mg.      Magnesium Oxide 250 MG TABS Take 250 mg by mouth daily Takes 3 times per week      omeprazole (PRILOSEC) 20 MG DR capsule Take 1 capsule (20 mg) by mouth daily (Patient taking differently: Take 20 mg by mouth three times a week.) 90 capsule 2    polyethylene glycol-propylene glycol (SYSTANE ULTRA) 0.4-0.3 % SOLN ophthalmic solution Place 1 drop into both eyes every hour as needed for dry eyes.      rosuvastatin (CRESTOR) 20 MG tablet Take 1 tablet (20 mg) by mouth every other day 45 tablet 3    traZODone (DESYREL) 100 MG tablet Take 150 mg by mouth at bedtime.      Zoledronic Acid (RECLAST IV) Inject into the vein. Infusion one time per year                 Review of Systems:     No history of bleeding difficulty, anesthesia problems or blood clots.  The 10 point Review of Systems is negative other than noted in the HPI           Physical Exam:   Blood pressure 119/80, pulse 77, temperature 98.7  F (37.1  C), temperature source Temporal, resp. rate 16, height 1.549 m (5' 1\"), " weight 90.3 kg (199 lb), SpO2 96%, not currently breastfeeding.    Constitutional: healthy appearing, alert and in no distress  Heent: Normocephalic. Head without obvious masses or lesions. PERRLDC, EOMI. Mouth exam within normal limits: tongue, mucous membranes, posterior pharynx all normal, no lesions or abnormalities seen. Neck supple, no nuchal rigidity or masses. No supraclavicular, or cervical adenopathy. Thyroid symmetric, no masses.  Cardiovascular: Regular rate and rhythm, no murmer, rub or gallops.  JVP not elevated, no edema.  Carotids within normal limits bilaterally, no bruits.  Respiratory: Normal respiratory effort.  Lungs clear, normal flow, no wheezing or crackles.  Gastrointestinal: Normal active bowel sounds.   Soft, not tender, no masses, guarding or rebound.  No hepatosplenomegaly.   Musculoskeletal: extremities normal, no gross deformities noted.  Skin: no suspicious lesions or rashes   Neurologic: Mental status within normal limits.  Speech fluent.  No gross motor abnormalities and gait intact.  Psychiatric: mentation appears normal and affect normal.  On her pointer finger on the left hand in between the PIP and DIP joint there is a slight amount of swelling that feels like a little cyst or varicose veins.  Is not red or warm.  It is slightly tender.         Data:   Labs sent, EKG -normal sinus rhythm, within normal limits        Assessment:   Normal preoperative examination, I believe this patient should be low risk for an anticipated procedure  Finger pain, likely cyst, she will follow-up with Ortho if this persist  Epigastric pain with gagging, need to evaluate for gallstone disease.  I doubt this is pancreatitis, outlet obstruction or ulcer.  No signs of acute abdomen  Discomfort in the left upper quadrant, negative exam, will check ultrasound but may need further evaluation the finding on finding  Mild coronary artery disease, recent stress test July 2023 negative for ischemia with a  normal EF.  I do not believe the patient needs further evaluation prior to her surgery.         Plan:   The patient is cleared for the anticipated procedure  Above regarding finger  Will check right upper quadrant ultrasound  She has follow-up with me in June  She may take her meds on the day of surgery with a small sip of water      Shawn Méndez M.D.

## 2025-05-16 NOTE — RESULT ENCOUNTER NOTE
Your test results look very good.  This includes your blood salts, kidney test, liver tests, sugar, and proteins.  It was nice to see you.  Your should be able to view the lab results.    Your complete blood count is also normal.    Please let me know if you have questions.    All

## 2025-05-21 ENCOUNTER — ANCILLARY PROCEDURE (OUTPATIENT)
Dept: ULTRASOUND IMAGING | Facility: CLINIC | Age: 85
End: 2025-05-21
Attending: INTERNAL MEDICINE
Payer: MEDICARE

## 2025-05-21 DIAGNOSIS — R10.13 EPIGASTRIC PAIN: ICD-10-CM

## 2025-05-21 DIAGNOSIS — R19.8 GAGGING EPISODE: ICD-10-CM

## 2025-05-21 PROCEDURE — 76705 ECHO EXAM OF ABDOMEN: CPT | Performed by: STUDENT IN AN ORGANIZED HEALTH CARE EDUCATION/TRAINING PROGRAM

## 2025-05-21 NOTE — RESULT ENCOUNTER NOTE
I am happy to report that your ultrasound was normal.  There are no gallstones or other signs of problems.  Please let me know if your symptoms are not resolved soon.    Shawn

## 2025-06-12 ENCOUNTER — OFFICE VISIT (OUTPATIENT)
Dept: FAMILY MEDICINE | Facility: CLINIC | Age: 85
End: 2025-06-12
Payer: MEDICARE

## 2025-06-12 VITALS
HEART RATE: 64 BPM | BODY MASS INDEX: 37 KG/M2 | TEMPERATURE: 97.8 F | HEIGHT: 61 IN | DIASTOLIC BLOOD PRESSURE: 75 MMHG | RESPIRATION RATE: 16 BRPM | SYSTOLIC BLOOD PRESSURE: 115 MMHG | OXYGEN SATURATION: 94 % | WEIGHT: 196 LBS

## 2025-06-12 DIAGNOSIS — E78.5 HYPERLIPIDEMIA LDL GOAL <130: ICD-10-CM

## 2025-06-12 DIAGNOSIS — Z00.00 ENCOUNTER FOR MEDICARE ANNUAL WELLNESS EXAM: Primary | ICD-10-CM

## 2025-06-12 DIAGNOSIS — R25.2 LEG CRAMPS: ICD-10-CM

## 2025-06-12 DIAGNOSIS — E04.1 THYROID NODULE: ICD-10-CM

## 2025-06-12 DIAGNOSIS — I25.10 CORONARY ARTERY DISEASE INVOLVING NATIVE CORONARY ARTERY OF NATIVE HEART WITHOUT ANGINA PECTORIS: ICD-10-CM

## 2025-06-12 DIAGNOSIS — K21.9 GASTROESOPHAGEAL REFLUX DISEASE, UNSPECIFIED WHETHER ESOPHAGITIS PRESENT: ICD-10-CM

## 2025-06-12 DIAGNOSIS — E66.01 MORBID OBESITY (H): ICD-10-CM

## 2025-06-12 DIAGNOSIS — I25.10 ASCVD (ARTERIOSCLEROTIC CARDIOVASCULAR DISEASE): ICD-10-CM

## 2025-06-12 DIAGNOSIS — R73.01 IFG (IMPAIRED FASTING GLUCOSE): ICD-10-CM

## 2025-06-12 DIAGNOSIS — N18.31 CHRONIC KIDNEY DISEASE, STAGE 3A (H): ICD-10-CM

## 2025-06-12 DIAGNOSIS — M81.0 AGE-RELATED OSTEOPOROSIS WITHOUT CURRENT PATHOLOGICAL FRACTURE: ICD-10-CM

## 2025-06-12 DIAGNOSIS — G47.33 OSA (OBSTRUCTIVE SLEEP APNEA): ICD-10-CM

## 2025-06-12 LAB
CHOLEST SERPL-MCNC: 156 MG/DL
CREAT UR-MCNC: 144 MG/DL
FASTING STATUS PATIENT QL REPORTED: YES
HDLC SERPL-MCNC: 85 MG/DL
LDLC SERPL CALC-MCNC: 58 MG/DL
MAGNESIUM SERPL-MCNC: 2.4 MG/DL (ref 1.7–2.3)
MICROALBUMIN UR-MCNC: <12 MG/L
MICROALBUMIN/CREAT UR: NORMAL MG/G{CREAT}
NONHDLC SERPL-MCNC: 71 MG/DL
TRIGL SERPL-MCNC: 66 MG/DL

## 2025-06-12 RX ORDER — EZETIMIBE 10 MG/1
10 TABLET ORAL EVERY OTHER DAY
Qty: 90 TABLET | Refills: 3 | Status: SHIPPED | OUTPATIENT
Start: 2025-06-12

## 2025-06-12 SDOH — HEALTH STABILITY: PHYSICAL HEALTH: ON AVERAGE, HOW MANY DAYS PER WEEK DO YOU ENGAGE IN MODERATE TO STRENUOUS EXERCISE (LIKE A BRISK WALK)?: 0 DAYS

## 2025-06-12 ASSESSMENT — PAIN SCALES - GENERAL: PAINLEVEL_OUTOF10: MILD PAIN (2)

## 2025-06-12 ASSESSMENT — SOCIAL DETERMINANTS OF HEALTH (SDOH): HOW OFTEN DO YOU GET TOGETHER WITH FRIENDS OR RELATIVES?: THREE TIMES A WEEK

## 2025-06-12 NOTE — PATIENT INSTRUCTIONS
Stop the crestor as I suspect it is causing your leg cramps.    I would get the prevnar 20 pneumonia shot at your pharmacy now and the rsv shot in the fall.  I would also get a covid shot.Patient Education   Preventive Care Advice   This is general advice given by our system to help you stay healthy. However, your care team may have specific advice just for you. Please talk to your care team about your preventive care needs.  Nutrition  Eat 5 or more servings of fruits and vegetables each day.  Try wheat bread, brown rice and whole grain pasta (instead of white bread, rice, and pasta).  Get enough calcium and vitamin D. Check the label on foods and aim for 100% of the RDA (recommended daily allowance).  Lifestyle  Exercise at least 150 minutes each week  (30 minutes a day, 5 days a week).  Do muscle strengthening activities 2 days a week. These help control your weight and prevent disease.  No smoking.  Wear sunscreen to prevent skin cancer.  Have a dental exam and cleaning every 6 months.  Yearly exams  See your health care team every year to talk about:  Any changes in your health.  Any medicines your care team has prescribed.  Preventive care, family planning, and ways to prevent chronic diseases.  Shots (vaccines)   HPV shots (up to age 26), if you've never had them before.  Hepatitis B shots (up to age 59), if you've never had them before.  COVID-19 shot: Get this shot when it's due.  Flu shot: Get a flu shot every year.  Tetanus shot: Get a tetanus shot every 10 years.  Pneumococcal, hepatitis A, and RSV shots: Ask your care team if you need these based on your risk.  Shingles shot (for age 50 and up)  General health tests  Diabetes screening:  Starting at age 35, Get screened for diabetes at least every 3 years.  If you are younger than age 35, ask your care team if you should be screened for diabetes.  Cholesterol test: At age 39, start having a cholesterol test every 5 years, or more often if advised.  Bone  density scan (DEXA): At age 50, ask your care team if you should have this scan for osteoporosis (brittle bones).  Hepatitis C: Get tested at least once in your life.  STIs (sexually transmitted infections)  Before age 24: Ask your care team if you should be screened for STIs.  After age 24: Get screened for STIs if you're at risk. You are at risk for STIs (including HIV) if:  You are sexually active with more than one person.  You don't use condoms every time.  You or a partner was diagnosed with a sexually transmitted infection.  If you are at risk for HIV, ask about PrEP medicine to prevent HIV.  Get tested for HIV at least once in your life, whether you are at risk for HIV or not.  Cancer screening tests  Cervical cancer screening: If you have a cervix, begin getting regular cervical cancer screening tests starting at age 21.  Breast cancer scan (mammogram): If you've ever had breasts, begin having regular mammograms starting at age 40. This is a scan to check for breast cancer.  Colon cancer screening: It is important to start screening for colon cancer at age 45.  Have a colonoscopy test every 10 years (or more often if you're at risk) Or, ask your provider about stool tests like a FIT test every year or Cologuard test every 3 years.  To learn more about your testing options, visit:   .  For help making a decision, visit:   https://bit.ly/hc82910.  Prostate cancer screening test: If you have a prostate, ask your care team if a prostate cancer screening test (PSA) at age 55 is right for you.  Lung cancer screening: If you are a current or former smoker ages 50 to 80, ask your care team if ongoing lung cancer screenings are right for you.  For informational purposes only. Not to replace the advice of your health care provider. Copyright   2023 [employer]. All rights reserved. Clinically reviewed by the  [facility]. AchaLa [id number] - REV 01/24.  Preventing Falls: Care  Instructions  Injuries and health problems such as trouble walking or poor eyesight can increase your risk of falling. So can some medicines. But there are things you can do to help prevent falls. You can exercise to get stronger. You can also arrange your home to make it safer.    Talk to your doctor about the medicines you take. Ask if any of them increase the risk of falls and whether they can be changed or stopped.   Try to exercise regularly. It can help improve your strength and balance. This can help lower your risk of falling.         Practice fall safety and prevention.   Wear low-heeled shoes that fit well and give your feet good support. Talk to your doctor if you have foot problems that make this hard.  Carry a cellphone or wear a medical alert device that you can use to call for help.  Use stepladders instead of chairs to reach high objects. Don't climb if you're at risk for falls. Ask for help, if needed.  Wear the correct eyeglasses, if you need them.        Make your home safer.   Remove rugs, cords, clutter, and furniture from walkways.  Keep your house well lit. Use night-lights in hallways and bathrooms.  Install and use sturdy handrails on stairways.  Wear nonskid footwear, even inside. Don't walk barefoot or in socks without shoes.        Be safe outside.   Use handrails, curb cuts, and ramps whenever possible.  Keep your hands free by using a shoulder bag or backpack.  Try to walk in well-lit areas. Watch out for uneven ground, changes in pavement, and debris.  Be careful in the winter. Walk on the grass or gravel when sidewalks are slippery. Use de-icer on steps and walkways. Add non-slip devices to shoes.    Put grab bars and nonskid mats in your shower or tub and near the toilet. Try to use a shower chair or bath bench when bathing.   Get into a tub or shower by putting in your weaker leg first. Get out with your strong side first. Have a phone or medical alert device in the bathroom with  "you.   Where can you learn more?  Go to https://www.[employer].net/patiented  Enter G117 in the search box to learn more about \"Preventing Falls: Care Instructions.\"  Current as of: July 31, 2024  Content Version: 14.5 2024-2025 [employer].   Care instructions adapted under license by your healthcare professional. If you have questions about a medical condition or this instruction, always ask your healthcare professional. [employer] disclaims any warranty or liability for your use of this information.    Hearing Loss: Care Instructions  Overview     Hearing loss is a sudden or slow decrease in how well you hear. It can range from slight to profound. Permanent hearing loss can occur with aging. It also can happen when you are exposed long-term to loud noise. Examples include listening to loud music, riding motorcycles, or being around other loud machines.  Hearing loss can affect your work and home life. It can make you feel lonely or depressed. You may feel that you have lost your independence. But hearing aids and other devices can help you hear better and feel connected to others.  Follow-up care is a key part of your treatment and safety. Be sure to make and go to all appointments, and call your doctor if you are having problems. It's also a good idea to know your test results and keep a list of the medicines you take.  How can you care for yourself at home?  Avoid loud noises whenever possible. This helps keep your hearing from getting worse.  Always wear hearing protection around loud noises.  Wear a hearing aid as directed.  A professional can help you pick a hearing aid that will work best for you.  You can also get hearing aids over the counter for mild to moderate hearing loss.  Have hearing tests as your doctor suggests. They can show whether your hearing has changed. Your hearing aid may need to be adjusted.  Use other devices as needed. These may include:  Telephone amplifiers " "and hearing aids that can connect to a television, stereo, radio, or microphone.  Devices that use lights or vibrations. These alert you to the doorbell, a ringing telephone, or a baby monitor.  Television closed-captioning. This shows the words at the bottom of the screen. Most new TVs can do this.  TTY (text telephone). This lets you type messages back and forth on the telephone instead of talking or listening. These devices are also called TDD. When messages are typed on the keyboard, they are sent over the phone line to a receiving TTY. The message is shown on a monitor.  Use text messaging, social media, and email if it is hard for you to communicate by telephone.  Try to learn a listening technique called speechreading. It is not lipreading. You pay attention to people's gestures, expressions, posture, and tone of voice. These clues can help you understand what a person is saying. Face the person you are talking to, and have them face you. Make sure the lighting is good. You need to see the other person's face clearly.  Think about counseling if you need help to adjust to your hearing loss.  When should you call for help?  Watch closely for changes in your health, and be sure to contact your doctor if:    You think your hearing is getting worse.     You have new symptoms, such as dizziness or nausea.   Where can you learn more?  Go to https://www.[employer].net/patiented  Enter R798 in the search box to learn more about \"Hearing Loss: Care Instructions.\"  Current as of: October 27, 2024  Content Version: 14.5 2024-2025 [employer].   Care instructions adapted under license by your healthcare professional. If you have questions about a medical condition or this instruction, always ask your healthcare professional. [employer] disclaims any warranty or liability for your use of this information.    Learning About Sleeping Well  What does sleeping well mean?     Sleeping well means " getting enough sleep to feel good and stay healthy. How much sleep is enough varies among people.  The number of hours you sleep and how you feel when you wake up are both important. If you do not feel refreshed, you probably need more sleep. Another sign of not getting enough sleep is feeling tired during the day.  Experts recommend that adults get at least 7 or more hours of sleep per day. Children and older adults need more sleep.  Why is getting enough sleep important?  Getting enough quality sleep is a basic part of good health. When your sleep suffers, your physical health, mood, and your thoughts can suffer too. You may find yourself feeling more grumpy or stressed. Not getting enough sleep also can lead to serious problems, including injury, accidents, anxiety, and depression.  What might cause poor sleeping?  Many things can cause sleep problems, including:  Changes to your sleep schedule.  Stress. Stress can be caused by fear about a single event, such as giving a speech. Or you may have ongoing stress, such as worry about work or school.  Depression, anxiety, and other mental or emotional conditions.  Changes in your sleep habits or surroundings. This includes changes that happen where you sleep, such as noise, light, or sleeping in a different bed. It also includes changes in your sleep pattern, such as having jet lag or working a late shift.  Health problems, such as pain, breathing problems, and restless legs syndrome.  Lack of regular exercise.  Using alcohol, nicotine, or caffeine before bed.  How can you help yourself?  Here are some tips that may help you sleep more soundly and wake up feeling more refreshed.  Your sleeping area   Use your bedroom only for sleeping and sex. A bit of light reading may help you fall asleep. But if it doesn't, do your reading elsewhere in the house. Try not to use your TV, computer, smartphone, or tablet while you are in bed.  Be sure your bed is big enough to stretch  "out comfortably, especially if you have a sleep partner.  Keep your bedroom quiet, dark, and cool. Use curtains, blinds, or a sleep mask to block out light. To block out noise, use earplugs, soothing music, or a \"white noise\" machine.  Your evening and bedtime routine   Create a relaxing bedtime routine. You might want to take a warm shower or bath, or listen to soothing music.  Go to bed at the same time every night. And get up at the same time every morning, even if you feel tired.  What to avoid   Limit caffeine (coffee, tea, caffeinated sodas) during the day, and don't have any for at least 6 hours before bedtime.  Avoid drinking alcohol before bedtime. Alcohol can cause you to wake up more often during the night.  Try not to smoke or use tobacco, especially in the evening. Nicotine can keep you awake.  Limit naps during the day, especially close to bedtime.  Avoid lying in bed awake for too long. If you can't fall asleep or if you wake up in the middle of the night and can't get back to sleep within about 20 minutes, get out of bed and go to another room until you feel sleepy.  Avoid taking medicine right before bed that may keep you awake or make you feel hyper or energized. Your doctor can tell you if your medicine may do this and if you can take it earlier in the day.  If you can't sleep   Imagine yourself in a peaceful, pleasant scene. Focus on the details and feelings of being in a place that is relaxing.  Get up and do a quiet or boring activity until you feel sleepy.  Avoid drinking any liquids before going to bed to help prevent waking up often to use the bathroom.  Where can you learn more?  Go to https://www.[employer]ic.net/patiented  Enter J942 in the search box to learn more about \"Learning About Sleeping Well.\"  Current as of: July 31, 2024  Content Version: 14.5    3833-3361 [employer].   Care instructions adapted under license by your healthcare professional. If you have questions " about a medical condition or this instruction, always ask your healthcare professional. [employer] disclaims any warranty or liability for your use of this information.    Bladder Training: Care Instructions  Your Care Instructions     Bladder training is used to treat urge incontinence and stress incontinence. Urge incontinence means that the need to urinate comes on so fast that you can't get to a toilet in time. Stress incontinence means that you leak urine because of pressure on your bladder. For example, it may happen when you laugh, cough, or lift something heavy.  Bladder training can increase how long you can wait before you have to urinate. It can also help your bladder hold more urine. And it can give you better control over the urge to urinate.  It is important to remember that bladder training takes a few weeks to a few months to make a difference. You may not see results right away, but don't give up.  Follow-up care is a key part of your treatment and safety. Be sure to make and go to all appointments, and call your doctor if you are having problems. It's also a good idea to know your test results and keep a list of the medicines you take.  How can you care for yourself at home?  Work with your doctor to come up with a bladder training program that is right for you. You may use one or more of the following methods.  Delayed urination  In the beginning, try to keep from urinating for 5 minutes after you first feel the need to go.  While you wait, take deep, slow breaths to relax. Kegel exercises can also help you delay the need to go to the bathroom.  After some practice, when you can easily wait 5 minutes to urinate, try to wait 10 minutes before you urinate.  Slowly increase the waiting period until you are able to control when you have to urinate.  Scheduled urination  Empty your bladder when you first wake up in the morning.  Schedule times throughout the day when you will  "urinate.  Start by going to the bathroom every hour, even if you don't need to go.  Slowly increase the time between trips to the bathroom.  When you have found a schedule that works well for you, keep doing it.  If you wake up during the night and have to urinate, do it. Apply your schedule to waking hours only.  Kegel exercises  These tighten and strengthen pelvic muscles, which can help you control the flow of urine. (If doing these exercises causes pain, stop doing them and talk with your doctor.) To do Kegel exercises:  Squeeze your muscles as if you were trying not to pass gas. Or squeeze your muscles as if you were stopping the flow of urine. Your belly, legs, and buttocks shouldn't move.  Hold the squeeze for 3 seconds, then relax for 5 to 10 seconds.  Start with 3 seconds, then add 1 second each week until you are able to squeeze for 10 seconds.  Repeat the exercise 10 times a session. Do 3 to 8 sessions a day.  When should you call for help?  Watch closely for changes in your health, and be sure to contact your doctor if:    Your incontinence is getting worse.     You do not get better as expected.   Where can you learn more?  Go to https://www.VMob.net/patiented  Enter V684 in the search box to learn more about \"Bladder Training: Care Instructions.\"  Current as of: April 30, 2024  Content Version: 14.5 2024-2025 "[employer].   Care instructions adapted under license by your healthcare professional. If you have questions about a medical condition or this instruction, always ask your healthcare professional. "[employer] disclaims any warranty or liability for your use of this information.    Substance Use Disorder: Care Instructions  Overview     You can improve your life and health by stopping your use of alcohol or drugs. When you don't drink or use drugs, you may feel and sleep better. You may get along better with your family, friends, and coworkers. There are medicines " and programs that can help with substance use disorder.  How can you care for yourself at home?  Here are some ways to help you stay sober and prevent relapse.  If you have been given medicine to help keep you sober or reduce your cravings, be sure to take it exactly as prescribed.  Talk to your doctor about programs that can help you stop using drugs or drinking alcohol.  Do not keep alcohol or drugs in your home.  Plan ahead. Think about what you'll say if other people ask you to drink or use drugs. Try not to spend time with people who drink or use drugs.  Use the time and money spent on drinking or drugs to do something that's important to you.  Preventing a relapse  Have a plan to deal with relapse. Learn to recognize changes in your thinking that lead you to drink or use drugs. Get help before you start to drink or use drugs again.  Try to stay away from situations, friends, or places that may lead you to drink or use drugs.  If you feel the need to drink alcohol or use drugs again, seek help right away. Call a trusted friend or family member. Some people get support from organizations such as Narcotics Anonymous or [employer] or from treatment facilities.  If you relapse, get help as soon as you can. Some people make a plan with another person that outlines what they want that person to do for them if they relapse. The plan usually includes how to handle the relapse and who to notify in case of relapse.  Don't give up. Remember that a relapse doesn't mean that you have failed. Use the experience to learn the triggers that lead you to drink or use drugs. Then quit again. Recovery is a lifelong process. Many people have several relapses before they are able to quit for good.  Follow-up care is a key part of your treatment and safety. Be sure to make and go to all appointments, and call your doctor if you are having problems. It's also a good idea to know your test results and keep a list of the medicines  "you take.  When should you call for help?   Call 911  anytime you think you may need emergency care. For example, call if you or someone else:    Has overdosed or has withdrawal signs. Be sure to tell the emergency workers that you are or someone else is using or trying to quit using drugs. Overdose or withdrawal signs may include:  Losing consciousness.  Seizure.  Seeing or hearing things that aren't there (hallucinations).     Is thinking or talking about suicide or harming others.   Where to get help 24 hours a day, 7 days a week   If you or someone you know talks about suicide, self-harm, a mental health crisis, a substance use crisis, or any other kind of emotional distress, get help right away. You can:    Call the Suicide and Crisis Lifeline at 988.     Call 6-201-529-TALK (1-628.449.3848).     Text HOME to 540335 to access the Crisis Text Line.   Consider saving these numbers in your phone.  Go to POW for more information or to chat online.  Call your doctor now or seek immediate medical care if:    You are having withdrawal symptoms. These may include nausea or vomiting, sweating, shakiness, and anxiety.   Watch closely for changes in your health, and be sure to contact your doctor if:    You have a relapse.     You need more help or support to stop.   Where can you learn more?  Go to https://www.[employer].net/patiented  Enter H573 in the search box to learn more about \"Substance Use Disorder: Care Instructions.\"  Current as of: August 20, 2024  Content Version: 14.5 2024-2025 [employer].   Care instructions adapted under license by your healthcare professional. If you have questions about a medical condition or this instruction, always ask your healthcare professional. [employer] disclaims any warranty or liability for your use of this information.       "

## 2025-06-12 NOTE — PROGRESS NOTES
Preventive Care Visit  [facility] LOREE Méndez MD, Internal Medicine  Jun 12, 2025          Justina Null is a 84 year old, presenting for the following:    For many years she gets nocturnal leg cramps.  They were worse the last 2 nights.  During the day she does not have any no history of claudication.  I reviewed the note from last year and she did note the same thing.  We stopped her Crestor and then in follow-up a month later her cramps resolved.  She is using Crestor and ezetimibe every other day.  She has presumed coronary artery disease.    She is otherwise doing well.  She had a urine incontinence surgery but it sounds like it did not help.  During her preop for that she mentioned several issues including epigastric discomfort and gagging and finger pain and those have all resolved.  She otherwise is doing well.  No complaints.               Past Medical History:      Past Medical History:   Diagnosis Date    Abdominal pain 06/2016    ct abd and pelvis with liver hemangiomas and renal cysts    ASCVD (arteriosclerotic cardiovascular disease) 08/2012    angio done and med mgmt, done after abnl ct angio    Aragon's cyst     left    Degenerative disk disease     bulge cervicle disk    DJD (degenerative joint disease)     GERD (gastroesophageal reflux disease) 2002    egd done    Gilbert's disease     borderline elevated BR    Hemangioma of liver 09/2012    ct angio with ?liver lesion, then ct of liver showed it be hemangioma, also renal cysts    Hx of colonoscopy 2010    [facility]    Hypercholesteremia     Hypertension     IFG (impaired fasting glucose)     Lumbar spinal stenosis 2018    done in [place], had mri    Obese     prior phen/fen   no valve dz    JOSE (obstructive sleep apnea) 2008    cpap    osteoporosis of hip 06/2024    Added reclast    Other symptoms involving cardiovascular system     bruit    Rosacea     Thyroid nodules 2011    Dr. Guzmán--benign    TMJ  (dislocation of temporomandibular joint)     left    Urine incontinence     had surgery 2025    Vertigo              Past Surgical History:      Past Surgical History:   Procedure Laterality Date    carpel tunnel surgery  2011    CATARACT EXTRACTION  2022    cataract surgery  2023    CORONARY ANGIOGRAPHY ADULT ORDER  2012    moderate CAD    lid lag surgery  2004    right knee arthroscopy  1999    TONSILLECTOMY  child             Social History:     Social History     Socioeconomic History    Marital status:      Spouse name: Not on file    Number of children: 3    Years of education: Not on file    Highest education level: Not on file   Occupational History    Occupation: homemaker   Tobacco Use    Smoking status: Former     Current packs/day: 0.00     Average packs/day: 1 pack/day for 5.4 years (5.4 ttl pk-yrs)     Types: Cigarettes     Start date:      Quit date: 1960     Years since quittin.0    Smokeless tobacco: Never   Vaping Use    Vaping status: Never Used   Substance and Sexual Activity    Alcohol use: No     Alcohol/week: 0.0 standard drinks of alcohol    Drug use: No    Sexual activity: Not Currently   Other Topics Concern    Parent/sibling w/ CABG, MI or angioplasty before 65F 55M? Yes     Service Not Asked    Blood Transfusions Not Asked    Caffeine Concern No     Comment: decaf:  0-2 cups a day    Occupational Exposure Not Asked    Hobby Hazards Not Asked    Sleep Concern Yes     Comment: soemtimes takes lorazepam    Stress Concern Yes    Weight Concern Yes    Special Diet No    Back Care Not Asked    Exercise Yes     Comment: water aerobics x4 days a week    Bike Helmet Not Asked    Seat Belt Yes    Self-Exams Not Asked   Social History Narrative    Not on file     Social Drivers of Health     Financial Resource Strain: Low Risk  (2025)    Financial Resource Strain     Within the past 12 months, have you or your family members you live  with been unable to get utilities (heat, electricity) when it was really needed?: No   Food Insecurity: Low Risk  (6/12/2025)    Food Insecurity     Within the past 12 months, did you worry that your food would run out before you got money to buy more?: No     Within the past 12 months, did the food you bought just not last and you didn t have money to get more?: No   Transportation Needs: Low Risk  (6/12/2025)    Transportation Needs     Within the past 12 months, has lack of transportation kept you from medical appointments, getting your medicines, non-medical meetings or appointments, work, or from getting things that you need?: No   Physical Activity: Unknown (6/12/2025)    Exercise Vital Sign     Days of Exercise per Week: 0 days     Minutes of Exercise per Session: Not on file   Stress: No Stress Concern Present (6/12/2025)    [place] [employer] - Occupational Stress Questionnaire     Feeling of Stress : Not at all   Social Connections: Unknown (6/12/2025)    Social Connection and Isolation Panel [NHANES]     Frequency of Communication with Friends and Family: Not on file     Frequency of Social Gatherings with Friends and Family: Three times a week     Attends Mosque Services: Not on file     Active Member of Clubs or Organizations: Not on file     Attends Club or Organization Meetings: Not on file     Marital Status: Not on file   Interpersonal Safety: Low Risk  (5/15/2025)    Interpersonal Safety     Do you feel physically and emotionally safe where you currently live?: Yes     Within the past 12 months, have you been hit, slapped, kicked or otherwise physically hurt by someone?: No     Within the past 12 months, have you been humiliated or emotionally abused in other ways by your partner or ex-partner?: No   Housing Stability: Low Risk  (6/12/2025)    Housing Stability     Do you have housing? : Yes     Are you worried about losing your housing?: No             Family History:    reviewed         Allergies:     Allergies   Allergen Reactions    Eszopiclone Muscle Pain (Myalgia)    Nickel      Positive on patch test    Blue Dyes (Parenteral) Other (See Comments)     Disperse Blue 106- Blue textile Dye    Lorazepam      Other Reaction(s): Leg cramps at night    Amoxicillin     Balsam Unknown    Balsam [place]-Castor [Balsam [place]-Castor Oil]     Cephalexin Diarrhea    Clindamycin Hcl     Conj Estrog-Medroxyprogest Ace Other (See Comments)     Leg aching and cramps    Erythromycin     Eugenol     Influenza Virus Vaccine     Neomycin Unknown    Neomycin Sulfate     Nizatidine Other (See Comments)     Elevated liver function tests    Raloxifene Other (See Comments)     Exacerbatoin of hot flashes             Medications:     Current Outpatient Medications   Medication Sig Dispense Refill    Calcium Carb-Cholecalciferol 600-12.5 MG-MCG CAPS Take by mouth 2 times daily.      Coenzyme Q10 (COQ-10 PO) Take 100 mg by mouth three times a week       cyanocobalamin (VITAMIN  B-12) 1000 MCG tablet Take 1,000 mcg by mouth three times a week.      ezetimibe (ZETIA) 10 MG tablet Take 1 tablet (10 mg) by mouth every other day. 90 tablet 3    gabapentin (NEURONTIN) 100 MG capsule 300 mg.      Magnesium Oxide 250 MG TABS Take 250 mg by mouth daily Takes 3 times per week      omeprazole (PRILOSEC) 20 MG DR capsule Take 1 capsule (20 mg) by mouth daily (Patient taking differently: Take 20 mg by mouth three times a week.) 90 capsule 2    polyethylene glycol-propylene glycol (SYSTANE ULTRA) 0.4-0.3 % SOLN ophthalmic solution Place 1 drop into both eyes every hour as needed for dry eyes.      rosuvastatin (CRESTOR) 20 MG tablet Take 1 tablet (20 mg) by mouth every other day 45 tablet 3    traZODone (DESYREL) 100 MG tablet Take 150 mg by mouth at bedtime.      Zoledronic Acid (RECLAST IV) Inject into the vein. Infusion one time per year                 Review of Systems:     The 10 point Review of Systems is negative  "other than noted in the HPI           Physical Exam:   Blood pressure 115/75, pulse 64, temperature 97.8  F (36.6  C), temperature source Temporal, resp. rate 16, height 1.544 m (5' 0.79\"), weight 88.9 kg (196 lb), SpO2 94%, not currently breastfeeding.    Exam:  Constitutional: healthy appearing, alert and in no distress  Heent: Normocephalic. Head without obvious masses or lesions. PERRLDC, EOMI. Mouth exam within normal limits: tongue, mucous membranes, posterior pharynx all normal, no lesions or abnormalities seen.  Tm's and canals within normal limits bilaterally. Neck supple, no nuchal rigidity or masses. No supraclavicular, or cervical adenopathy. Thyroid symmetric, no masses.  Cardiovascular: Regular rate and rhythm, no murmer, rub or gallops.  JVP not elevated, no edema.  Carotids within normal limits bilaterally, no bruits.  Respiratory: Normal respiratory effort.  Lungs clear, normal flow, no wheezing or crackles.  Gastrointestinal: Normal active bowel sounds.   Soft, not tender, no masses, guarding or rebound.  No hepatosplenomegaly.   Musculoskeletal: extremities normal, no gross deformities noted.  Skin: no suspicious lesions or rashes   Neurologic: Mental status within normal limits.  Speech fluent.  No gross motor abnormalities and gait intact.  Psychiatric: mentation appears normal and affect normal.  Pedal pulses somewhat decreased but present.         Data:   Labs sent, some done already        Assessment:   Normal complete physical exam  Nocturnal leg cramps, most consistent with statin.  Will have her stop just the Crestor.  Will do a follow-up in 4 weeks and if resolved consider other agent.  I do not think this is peripheral vascular disease or back related  CKD, check labs  Obesity, weight loss recommended  Reflux, controlled on PPI  Hyperlipidemia, as above  Sleep apnea, CPAP  Thyroid nodules, no follow-up indicated  Presumed coronary disease, med management  Elevated sugar, " labs  Osteoporosis, getting IV Reclast in September as of last year and will continue this  Healthcare maintenance         Plan:   Vaccines at pharmacy  Stop Crestor  Video visit in 4 weeks  Letter with labs  IV Reclast in September  Exercise and diet  Letter with labs    The longitudinal plan of care for the diagnosis(es)/condition(s) as documented were addressed during this visit. Due to the added complexity in care, I will continue to support Tennille in the subsequent management and with ongoing continuity of care.        Shawn Méndez M.D.      Appropriate preventive services were discussed with this patient via screening, questionairrere, or discussion as appropriate for fall prevention, nutrition, physical activity, social engagement, weight loss and cognition.  Check list reviewing preventive services available has been given to the patient.  Reviewed patient's diet, addressing concerns and questions as appropriate.  Advice given for smoking sensation when appropriate.          No chief complaint on file.           HPI           Advance Care Planning    Patient states has Health Care Directive and will send to [employer] [employer].        6/12/2025   General Health   How would you rate your overall physical health? Good   Feel stress (tense, anxious, or unable to sleep) Not at all         6/12/2025   Nutrition   Diet: Regular (no restrictions)    Low salt       Multiple values from one day are sorted in reverse-chronological order         6/12/2025   Exercise   Days per week of moderate/strenous exercise 0 days   (!) EXERCISE CONCERN      6/12/2025   Social Factors   Frequency of gathering with friends or relatives Three times a week   Worry food won't last until get money to buy more No   Food not last or not have enough money for food? No   Do you have housing? (Housing is defined as stable permanent housing and does not include staying outside in a car, in a tent, in an abandoned building, in an overnight  shelter, or couch-surfing.) Yes   Are you worried about losing your housing? No   Lack of transportation? No   Unable to get utilities (heat,electricity)? No         6/12/2025   Fall Risk   Fallen 2 or more times in the past year? No    No   Trouble with walking or balance? Yes    Yes   Gait Speed Test (Document in seconds) 4   Gait Speed Test Interpretation Less than or equal to 5.00 seconds - PASS       Multiple values from one day are sorted in reverse-chronological order          6/12/2025   Activities of Daily Living- Home Safety   Needs help with the following daily activites None of the above   Safety concerns in the home None of the above         6/12/2025   Dental   Dentist two times every year? Yes         6/12/2025   Hearing Screening   Hearing concerns? (!) IT'S HARD TO FOLLOW A CONVERSATION IN A NOISY RESTAURANT OR CROWDED ROOM.    (!) TROUBLE UNDERSTANDING SOFT OR WHISPERED SPEECH.       Multiple values from one day are sorted in reverse-chronological order         6/12/2025   Driving Risk Screening   Patient/family members have concerns about driving No         6/12/2025   General Alertness/Fatigue Screening   Have you been more tired than usual lately? (!) YES         6/12/2025   Urinary Incontinence Screening   Bothered by leaking urine in past 6 months Yes         Today's PHQ-2 Score:       6/12/2025    10:38 AM   PHQ-2 ( 1999 Pf[employer])   Q1: Little interest or pleasure in doing things 1   Q2: Feeling down, depressed or hopeless 0   PHQ-2 Score 1    Q1: Little interest or pleasure in doing things Several days   Q2: Feeling down, depressed or hopeless Not at all   PHQ-2 Score 1       Patient-reported           6/12/2025   Substance Use   Alcohol more than 3/day or more than 7/wk No   Do you have a current opioid prescription? No   How severe/bad is pain from 1 to 10? 10/10   Do you use any other substances recreationally? No    (!) PRESCRIPTION DRUGS       Multiple values from one day are sorted in  "reverse-chronological order     Social History     Tobacco Use    Smoking status: Former     Current packs/day: 0.00     Average packs/day: 1 pack/day for 5.4 years (5.4 ttl pk-yrs)     Types: Cigarettes     Start date:      Quit date: 1960     Years since quittin.0    Smokeless tobacco: Never   Vaping Use    Vaping status: Never Used   Substance Use Topics    Alcohol use: No     Alcohol/week: 0.0 standard drinks of alcohol    Drug use: No                        Reviewed and updated as needed this visit by Provider                      Current providers sharing in care for this patient include:  Patient Care Team:  Shawn Méndez MD as PCP - General (Internal Medicine)  Agus Gill MD as MD (Cardiovascular Disease)  Shawn Méndez MD as Assigned PCP  Agus Gill MD as Assigned Heart and Vascular Provider    The following health maintenance items are reviewed in Epic and correct as of today:  Health Maintenance   Topic Date Due    ANNUAL REVIEW OF  ORDERS  Never done    ZOSTER VACCINE (2 of 3) 2007    RSV VACCINE (1 - 1-dose 75+ series) Never done    COVID-19 VACCINE (3 - - season) 2024    MICROALBUMIN  2025    MEDICARE ANNUAL WELLNESS VISIT  2025    INFLUENZA VACCINE (Season Ended) 2025    LIPID  2025    BMP  05/15/2026    HEMOGLOBIN  05/15/2026    FALL RISK ASSESSMENT  2026    ADVANCE CARE PLANNING  2029    DTAP/TDAP/TD VACCINE (3 - Td or Tdap) 2029    DEXA  2039    PHQ-2 (once per calendar year)  Completed    PNEUMOCOCCAL VACCINE 50+ YEARS  Completed    URINALYSIS  Completed    HPV VACCINE  Aged Out    MENINGITIS VACCINE  Aged Out            Objective    Exam  LMP  (LMP Unknown)    Estimated body mass index is 37.6 kg/m  as calculated from the following:    Height as of 5/15/25: 1.549 m (5' 1\").    Weight as of 5/15/25: 90.3 kg (199 lb).    Physical Exam  Gait and balance assessed per Gait Speed Test.  " Result as above.        6/12/2025   Mini Cog   Clock Draw Score 2 Normal   3 Item Recall 3 objects recalled   Mini Cog Total Score 5              Signed Electronically by: Shawn Méndez MD

## 2025-06-13 ENCOUNTER — RESULTS FOLLOW-UP (OUTPATIENT)
Dept: FAMILY MEDICINE | Facility: CLINIC | Age: 85
End: 2025-06-13

## 2025-07-10 ENCOUNTER — TELEPHONE (OUTPATIENT)
Dept: FAMILY MEDICINE | Facility: CLINIC | Age: 85
End: 2025-07-10
Payer: MEDICARE

## 2025-07-10 NOTE — TELEPHONE ENCOUNTER
Patient can't remember how to do virtual visit. Requests help with tomorrow VV at 12:25 PM with Dr. Méndez.     Patient requests call today to help her prepare for her visit. Has  tomorrow morning and will not be available tomorrow before her appointment.   Elisa Kolb RN

## 2025-07-11 PROBLEM — M54.17 LUMBOSACRAL RADICULOPATHY AT L5: Status: ACTIVE | Noted: 2025-06-01

## (undated) RX ORDER — TRIAMCINOLONE ACETONIDE 40 MG/ML
INJECTION, SUSPENSION INTRA-ARTICULAR; INTRAMUSCULAR
Status: DISPENSED
Start: 2019-08-07

## (undated) RX ORDER — BUPIVACAINE HYDROCHLORIDE 2.5 MG/ML
INJECTION, SOLUTION EPIDURAL; INFILTRATION; INTRACAUDAL
Status: DISPENSED
Start: 2019-08-07

## (undated) RX ORDER — LIDOCAINE HYDROCHLORIDE 10 MG/ML
INJECTION, SOLUTION EPIDURAL; INFILTRATION; INTRACAUDAL; PERINEURAL
Status: DISPENSED
Start: 2019-08-07